# Patient Record
Sex: FEMALE | Race: OTHER | Employment: UNEMPLOYED | URBAN - METROPOLITAN AREA
[De-identification: names, ages, dates, MRNs, and addresses within clinical notes are randomized per-mention and may not be internally consistent; named-entity substitution may affect disease eponyms.]

---

## 2018-07-09 DIAGNOSIS — M79.604 PAIN OF RIGHT LEG: Primary | ICD-10-CM

## 2019-11-24 ENCOUNTER — HOSPITAL ENCOUNTER (EMERGENCY)
Facility: HOSPITAL | Age: 59
Discharge: HOME | End: 2019-11-24
Attending: EMERGENCY MEDICINE
Payer: MEDICAID

## 2019-11-24 VITALS
RESPIRATION RATE: 18 BRPM | OXYGEN SATURATION: 97 % | WEIGHT: 199 LBS | SYSTOLIC BLOOD PRESSURE: 143 MMHG | BODY MASS INDEX: 35.26 KG/M2 | DIASTOLIC BLOOD PRESSURE: 81 MMHG | HEART RATE: 88 BPM | HEIGHT: 63 IN | TEMPERATURE: 99 F

## 2019-11-24 DIAGNOSIS — K12.1 ULCERATION, ORAL MUCOSA: Primary | ICD-10-CM

## 2019-11-24 PROCEDURE — 99283 PR EMERGENCY DEPT VISIT,LEVEL III: ICD-10-PCS | Mod: ,,, | Performed by: PHYSICIAN ASSISTANT

## 2019-11-24 PROCEDURE — 99284 EMERGENCY DEPT VISIT MOD MDM: CPT

## 2019-11-24 PROCEDURE — 99283 EMERGENCY DEPT VISIT LOW MDM: CPT | Mod: ,,, | Performed by: PHYSICIAN ASSISTANT

## 2019-11-24 RX ORDER — AMITRIPTYLINE HYDROCHLORIDE 150 MG/1
150 TABLET ORAL NIGHTLY
COMMUNITY

## 2019-11-24 RX ORDER — PAROXETINE HYDROCHLORIDE 40 MG/1
40 TABLET, FILM COATED ORAL DAILY
COMMUNITY

## 2019-11-24 NOTE — ED NOTES
LOC: The patient is awake, alert and aware of environment with an appropriate affect, the patient is oriented x 3 and speaking appropriately.  APPEARANCE: Patient resting comfortably and in no acute distress, patient is clean and well groomed, patient's clothing is properly fastened.  MUSCULOSKELETAL: Patient moving all extremities spontaneously, no obvious swelling or deformities noted.  RESPIRATORY: Airway is open and patent, respirations are spontaneous, patient has a normal effort and rate, no accessory muscle use noted.

## 2019-11-24 NOTE — ED TRIAGE NOTES
"Pt reports pain to upper right gumline.  Pt states " I was playing with my dentures in my mouth because they don't fit".   "

## 2019-11-24 NOTE — ED PROVIDER NOTES
Encounter Date: 11/24/2019       History     Chief Complaint   Patient presents with    Dental Pain     was playing with dentures in my mouth yest and now i got a sore upper r going to my nose 'cavity     59-year-old female presents to the ED for evaluation of mouth pain.  Patient states that she was playing with her dentures yesterday, which she states are poorly fitted, and now has soreness to the R upper gumline.          Review of patient's allergies indicates:  No Known Allergies  No past medical history on file.  No past surgical history on file.  No family history on file.  Social History     Tobacco Use    Smoking status: Not on file   Substance Use Topics    Alcohol use: Not on file    Drug use: Not on file     Review of Systems   Constitutional: Negative for fever.   HENT: Positive for mouth sores.    Neurological: Negative for weakness.       Physical Exam     Initial Vitals [11/24/19 1644]   BP Pulse Resp Temp SpO2   (!) 143/81 88 18 98.6 °F (37 °C) 97 %      MAP       --         Physical Exam    Constitutional: She appears well-developed and well-nourished. She is not diaphoretic.  Non-toxic appearance. She does not appear ill.   HENT:   Mouth/Throat: Oral lesions present.   White ulcerations to the R upper gumline.  No significant swelling, induration, erythema. Pt is edentulous on top portion of mouth.    Pulmonary/Chest: No accessory muscle usage. No tachypnea. No respiratory distress.   Neurological: She is alert.         ED Course   Procedures  Labs Reviewed - No data to display       Imaging Results    None          Medical Decision Making:   History:   Old Medical Records: I decided to obtain old medical records.  Differential Diagnosis:   My differential diagnosis includes but is not limited to:  Ulceration, mucosal abrasion, thrush, abscess  ED Management:  58 yo with ulcerations to the gumline due to poorly fitting dentures. No evidence of infection.  Will prescribe magic mouthwash.  Pt  also has Rx pain medication for chronic pain.  Advised pt to follow up in dental clinic for refitting of dentures. Stable for discharge.                                       Clinical Impression:       ICD-10-CM ICD-9-CM   1. Ulceration, oral mucosa K12.1 528.9         Disposition:   Disposition: Discharged  Condition: Stable                     Annie العلي PA-C  11/24/19 5376

## 2019-11-24 NOTE — DISCHARGE INSTRUCTIONS
You have mouth ulcers due to poorly fitting dentures.  You should follow up with a dental clinic to have your dentures refitted.   Use the prescription mouthwash as directed.   Return to the ER for any new or significantly worsening symptoms.

## 2019-11-25 ENCOUNTER — HOSPITAL ENCOUNTER (EMERGENCY)
Facility: HOSPITAL | Age: 59
Discharge: HOME | End: 2019-11-25
Attending: EMERGENCY MEDICINE
Payer: MEDICAID

## 2019-11-25 VITALS
HEART RATE: 86 BPM | SYSTOLIC BLOOD PRESSURE: 149 MMHG | WEIGHT: 198 LBS | BODY MASS INDEX: 35.08 KG/M2 | DIASTOLIC BLOOD PRESSURE: 79 MMHG | OXYGEN SATURATION: 99 % | TEMPERATURE: 97 F | RESPIRATION RATE: 20 BRPM | HEIGHT: 63 IN

## 2019-11-25 DIAGNOSIS — K11.20 PAROTITIS: Primary | ICD-10-CM

## 2019-11-25 LAB
AMYLASE SERPL-CCNC: 34 U/L (ref 20–110)
ANION GAP SERPL CALC-SCNC: 9 MMOL/L (ref 8–16)
BASOPHILS # BLD AUTO: 0.07 K/UL (ref 0–0.2)
BASOPHILS NFR BLD: 0.5 % (ref 0–1.9)
BUN SERPL-MCNC: 9 MG/DL (ref 6–20)
CALCIUM SERPL-MCNC: 9.5 MG/DL (ref 8.7–10.5)
CHLORIDE SERPL-SCNC: 97 MMOL/L (ref 95–110)
CO2 SERPL-SCNC: 30 MMOL/L (ref 23–29)
CREAT SERPL-MCNC: 0.8 MG/DL (ref 0.5–1.4)
DIFFERENTIAL METHOD: ABNORMAL
EOSINOPHIL # BLD AUTO: 0.2 K/UL (ref 0–0.5)
EOSINOPHIL NFR BLD: 1.4 % (ref 0–8)
ERYTHROCYTE [DISTWIDTH] IN BLOOD BY AUTOMATED COUNT: 12.3 % (ref 11.5–14.5)
EST. GFR  (AFRICAN AMERICAN): >60 ML/MIN/1.73 M^2
EST. GFR  (NON AFRICAN AMERICAN): >60 ML/MIN/1.73 M^2
GLUCOSE SERPL-MCNC: 156 MG/DL (ref 70–110)
HCT VFR BLD AUTO: 40 % (ref 37–48.5)
HGB BLD-MCNC: 13.3 G/DL (ref 12–16)
IMM GRANULOCYTES # BLD AUTO: 0.06 K/UL (ref 0–0.04)
IMM GRANULOCYTES NFR BLD AUTO: 0.4 % (ref 0–0.5)
LYMPHOCYTES # BLD AUTO: 1.9 K/UL (ref 1–4.8)
LYMPHOCYTES NFR BLD: 12.8 % (ref 18–48)
MCH RBC QN AUTO: 27.9 PG (ref 27–31)
MCHC RBC AUTO-ENTMCNC: 33.3 G/DL (ref 32–36)
MCV RBC AUTO: 84 FL (ref 82–98)
MONOCYTES # BLD AUTO: 0.7 K/UL (ref 0.3–1)
MONOCYTES NFR BLD: 5 % (ref 4–15)
NEUTROPHILS # BLD AUTO: 11.6 K/UL (ref 1.8–7.7)
NEUTROPHILS NFR BLD: 79.9 % (ref 38–73)
NRBC BLD-RTO: 0 /100 WBC
PLATELET # BLD AUTO: 295 K/UL (ref 150–350)
PMV BLD AUTO: 10.2 FL (ref 9.2–12.9)
POTASSIUM SERPL-SCNC: 3.8 MMOL/L (ref 3.5–5.1)
RBC # BLD AUTO: 4.76 M/UL (ref 4–5.4)
SODIUM SERPL-SCNC: 136 MMOL/L (ref 136–145)
WBC # BLD AUTO: 14.56 K/UL (ref 3.9–12.7)

## 2019-11-25 PROCEDURE — 25000003 PHARM REV CODE 250: Performed by: EMERGENCY MEDICINE

## 2019-11-25 PROCEDURE — 96374 THER/PROPH/DIAG INJ IV PUSH: CPT

## 2019-11-25 PROCEDURE — 99284 PR EMERGENCY DEPT VISIT,LEVEL IV: ICD-10-PCS | Mod: ,,, | Performed by: EMERGENCY MEDICINE

## 2019-11-25 PROCEDURE — 82150 ASSAY OF AMYLASE: CPT

## 2019-11-25 PROCEDURE — 85025 COMPLETE CBC W/AUTO DIFF WBC: CPT

## 2019-11-25 PROCEDURE — 99285 EMERGENCY DEPT VISIT HI MDM: CPT | Mod: 25

## 2019-11-25 PROCEDURE — 99284 EMERGENCY DEPT VISIT MOD MDM: CPT | Mod: ,,, | Performed by: EMERGENCY MEDICINE

## 2019-11-25 PROCEDURE — 80048 BASIC METABOLIC PNL TOTAL CA: CPT

## 2019-11-25 PROCEDURE — 63600175 PHARM REV CODE 636 W HCPCS: Performed by: EMERGENCY MEDICINE

## 2019-11-25 PROCEDURE — 25500020 PHARM REV CODE 255: Performed by: EMERGENCY MEDICINE

## 2019-11-25 RX ORDER — IBUPROFEN 400 MG/1
400 TABLET ORAL
Status: COMPLETED | OUTPATIENT
Start: 2019-11-25 | End: 2019-11-25

## 2019-11-25 RX ORDER — CEFTRIAXONE 1 G/1
1 INJECTION, POWDER, FOR SOLUTION INTRAMUSCULAR; INTRAVENOUS
Status: COMPLETED | OUTPATIENT
Start: 2019-11-25 | End: 2019-11-25

## 2019-11-25 RX ORDER — HYDROCODONE BITARTRATE AND ACETAMINOPHEN 5; 325 MG/1; MG/1
1 TABLET ORAL EVERY 4 HOURS PRN
Qty: 18 TABLET | Refills: 0 | Status: ON HOLD | OUTPATIENT
Start: 2019-11-25 | End: 2021-06-06 | Stop reason: HOSPADM

## 2019-11-25 RX ORDER — AMOXICILLIN AND CLAVULANATE POTASSIUM 875; 125 MG/1; MG/1
1 TABLET, FILM COATED ORAL 2 TIMES DAILY
Qty: 14 TABLET | Refills: 0 | Status: ON HOLD | OUTPATIENT
Start: 2019-11-25 | End: 2021-06-24

## 2019-11-25 RX ADMIN — IOHEXOL 100 ML: 350 INJECTION, SOLUTION INTRAVENOUS at 03:11

## 2019-11-25 RX ADMIN — IBUPROFEN 400 MG: 400 TABLET, FILM COATED ORAL at 03:11

## 2019-11-25 RX ADMIN — CEFTRIAXONE SODIUM 1 G: 1 INJECTION, POWDER, FOR SOLUTION INTRAMUSCULAR; INTRAVENOUS at 05:11

## 2019-11-25 NOTE — ED NOTES
Patient identifiers verified and correct for MS Bran  C/C: Swelling to right face, redness and warmth SEE NN  APPEARANCE: awake and alert in NAD.  SKIN: warm, dry, swelling noted.   MUSCULOSKELETAL: Patient moving all extremities spontaneousl Ambulates independently.  RESPIRATORY: Denies shortness of breath.Respirations unlabored.   CARDIAC: Denies CP, 2+ distal pulses; no peripheral edema  ABDOMEN: S/ND/NT, Denies nausea  : voids spontaneously, denies difficulty  Neurologic: AAO x 4; follows commands equal strength in all extremities; denies numbness/tingling. Denies dizziness Denies weakness

## 2019-11-25 NOTE — ED TRIAGE NOTES
Patient states left facial swelling onset this am, seen in ED yesterday for pain/uklcers to mouth.

## 2019-11-25 NOTE — ED PROVIDER NOTES
Encounter Date: 11/25/2019    SCRIBE #1 NOTE: I, Rose Mary Alberto, am scribing for, and in the presence of,  Dr. Joseph Hollingsworth. I have scribed the following portions of the note - Other sections scribed: HPI, ROS, and PE .       History     Chief Complaint   Patient presents with    Facial Swelling     Patient is a 59 year old female with a past medical history of chronic back pain with complaints of facial swelling. The patient reports she visited the ED yesterday for mild mouth pain due to denture displacement. She states she woke up with right sided facial swelling this morning. The patient reports she has been treating the mouth pain and facial swelling with over the counter medications and warm compresses. She also endorses a fever. The patient reports she takes pain medications for her chronic back pain.     The history is provided by the patient and medical records.     Review of patient's allergies indicates:  No Known Allergies  History reviewed. No pertinent past medical history.  History reviewed. No pertinent surgical history.  History reviewed. No pertinent family history.  Social History     Tobacco Use    Smoking status: Current Every Day Smoker     Packs/day: 1.00     Types: Cigarettes    Smokeless tobacco: Never Used   Substance Use Topics    Alcohol use: Never     Frequency: Never    Drug use: Never     Review of Systems   Constitutional: Positive for fever. Negative for chills.   HENT: Positive for dental problem. Negative for rhinorrhea and sore throat.         + facial swelling   Eyes: Negative for visual disturbance.   Respiratory: Negative for shortness of breath.    Cardiovascular: Negative for chest pain.   Gastrointestinal: Negative for nausea and vomiting.   Genitourinary: Negative for dysuria and hematuria.   Musculoskeletal: Negative for back pain and neck pain.   Skin: Negative for rash.   Neurological: Negative for weakness and numbness.   Hematological: Does not bruise/bleed easily.        Physical Exam     Initial Vitals [11/25/19 1313]   BP Pulse Resp Temp SpO2   (!) 142/76 96 18 100 °F (37.8 °C) 100 %      MAP       --         Physical Exam    Nursing note and vitals reviewed.  Constitutional: She appears well-developed and well-nourished. She is not diaphoretic. No distress.   HENT:   Head: Normocephalic and atraumatic.   Mouth/Throat: Oropharynx is clear and moist.   + right facial swelling; + right infraorbital edema; + diffuse periodontal disease; + wear marks on upper right denture plate    Eyes: EOM are normal. Right eye exhibits no discharge. Left eye exhibits no discharge.   Neck: Normal range of motion. Neck supple.   Cardiovascular: Normal rate, regular rhythm, normal heart sounds and intact distal pulses. Exam reveals no gallop and no friction rub.    No murmur heard.  Pulmonary/Chest: Breath sounds normal. No respiratory distress.   Abdominal: Soft. Bowel sounds are normal. She exhibits no distension. There is no tenderness. There is no rebound and no guarding.   Musculoskeletal: Normal range of motion. She exhibits no tenderness.   Neurological: She is alert and oriented to person, place, and time. She has normal strength.   Skin: Skin is warm and dry. Capillary refill takes less than 2 seconds.         ED Course   Procedures  Labs Reviewed   BASIC METABOLIC PANEL - Abnormal; Notable for the following components:       Result Value    CO2 30 (*)     Glucose 156 (*)     All other components within normal limits   CBC W/ AUTO DIFFERENTIAL - Abnormal; Notable for the following components:    WBC 14.56 (*)     Gran # (ANC) 11.6 (*)     Immature Grans (Abs) 0.06 (*)     Gran% 79.9 (*)     Lymph% 12.8 (*)     All other components within normal limits   AMYLASE   AMYLASE    Narrative:     ADD-ON MITCH #[id number] PER ARABELLA CLOUD DO 16:07  11/25/2019           Imaging Results          CT Maxillofacial With Contrast (In process)  Result time 11/25/19 16:51:40                 Medical  Decision Making:   History:   Old Medical Records: I decided to obtain old medical records.  Initial Assessment:   The patient's right facial swelling and infraorbital edema. Intraoral exam does show some small ulceration over the top of her denture line.  She has some diffuse.  Oral disease no evidence of a Jovani's angina.  She is tender over the buccal mucosa numb suspicious that this could be parotitis.  Differential Diagnosis:   Differential diagnosis includes but is not limited to parotitis, odontogenic abscess, Jovain's angina  Clinical Tests:   Lab Tests: Ordered and Reviewed  Radiological Study: Ordered and Reviewed  ED Management:  Patient was seen and examined lab work was obtained CT IV maxillofacial was obtained.    1656:  Went over the results with the patient.  Radiology did contact me this is consistent with a parotitis.  She will be placed on Augmentin and instructed to suck on sour lemon wedges to increase elevation.            Scribe Attestation:   Scribe #1: I performed the above scribed service and the documentation accurately describes the services I performed. I attest to the accuracy of the note.    Attending Attestation:           Physician Attestation for Scribe:      Comments: I, Dr. Hollingsworth, personally performed the services described in this documentation. All medical record entries made by the scribe were at my direction and in my presence.  I have reviewed the chart and agree that the record reflects my personal performance and is accurate and complete. Joseph Hollingsworth DO  4:56 PM 11/25/2019                            Clinical Impression:       ICD-10-CM ICD-9-CM   1. Parotitis K11.20 527.2         Disposition:   Disposition: Discharged  Condition: Stable                     Joseph Hollingsworth DO  11/25/19 6640

## 2021-06-04 ENCOUNTER — HOSPITAL ENCOUNTER (OUTPATIENT)
Facility: HOSPITAL | Age: 61
Discharge: HOME | End: 2021-06-07
Attending: EMERGENCY MEDICINE | Admitting: STUDENT IN AN ORGANIZED HEALTH CARE EDUCATION/TRAINING PROGRAM
Payer: MEDICAID

## 2021-06-04 DIAGNOSIS — K85.10 ACUTE BILIARY PANCREATITIS, UNSPECIFIED COMPLICATION STATUS: Primary | ICD-10-CM

## 2021-06-04 DIAGNOSIS — R10.9 ABDOMINAL PAIN: ICD-10-CM

## 2021-06-04 DIAGNOSIS — K85.10 GALLSTONE PANCREATITIS: ICD-10-CM

## 2021-06-04 LAB
ALBUMIN SERPL BCP-MCNC: 3.8 G/DL (ref 3.5–5.2)
ALP SERPL-CCNC: 127 U/L (ref 55–135)
ALT SERPL W/O P-5'-P-CCNC: 58 U/L (ref 10–44)
ANION GAP SERPL CALC-SCNC: 14 MMOL/L (ref 8–16)
AST SERPL-CCNC: 41 U/L (ref 10–40)
BACTERIA #/AREA URNS AUTO: ABNORMAL /HPF
BASOPHILS # BLD AUTO: 0.04 K/UL (ref 0–0.2)
BASOPHILS NFR BLD: 0.5 % (ref 0–1.9)
BILIRUB SERPL-MCNC: 0.5 MG/DL (ref 0.1–1)
BILIRUB UR QL STRIP: NEGATIVE
BUN SERPL-MCNC: 23 MG/DL (ref 6–20)
BUN SERPL-MCNC: 26 MG/DL (ref 6–30)
CALCIUM SERPL-MCNC: 10.6 MG/DL (ref 8.7–10.5)
CHLORIDE SERPL-SCNC: 95 MMOL/L (ref 95–110)
CHLORIDE SERPL-SCNC: 97 MMOL/L (ref 95–110)
CLARITY UR REFRACT.AUTO: ABNORMAL
CO2 SERPL-SCNC: 27 MMOL/L (ref 23–29)
COLOR UR AUTO: YELLOW
CREAT SERPL-MCNC: 0.7 MG/DL (ref 0.5–1.4)
CREAT SERPL-MCNC: 0.8 MG/DL (ref 0.5–1.4)
DIFFERENTIAL METHOD: ABNORMAL
EOSINOPHIL # BLD AUTO: 0.1 K/UL (ref 0–0.5)
EOSINOPHIL NFR BLD: 0.7 % (ref 0–8)
ERYTHROCYTE [DISTWIDTH] IN BLOOD BY AUTOMATED COUNT: 11.9 % (ref 11.5–14.5)
EST. GFR  (AFRICAN AMERICAN): >60 ML/MIN/1.73 M^2
EST. GFR  (NON AFRICAN AMERICAN): >60 ML/MIN/1.73 M^2
GLUCOSE SERPL-MCNC: 148 MG/DL (ref 70–110)
GLUCOSE SERPL-MCNC: 151 MG/DL (ref 70–110)
GLUCOSE UR QL STRIP: NEGATIVE
HCT VFR BLD AUTO: 35.3 % (ref 37–48.5)
HCT VFR BLD CALC: 36 %PCV (ref 36–54)
HGB BLD-MCNC: 12.2 G/DL (ref 12–16)
HGB UR QL STRIP: ABNORMAL
IMM GRANULOCYTES # BLD AUTO: 0.02 K/UL (ref 0–0.04)
IMM GRANULOCYTES NFR BLD AUTO: 0.3 % (ref 0–0.5)
KETONES UR QL STRIP: NEGATIVE
LACTATE SERPL-SCNC: 1.2 MMOL/L (ref 0.5–2.2)
LEUKOCYTE ESTERASE UR QL STRIP: ABNORMAL
LIPASE SERPL-CCNC: 126 U/L (ref 4–60)
LYMPHOCYTES # BLD AUTO: 1.1 K/UL (ref 1–4.8)
LYMPHOCYTES NFR BLD: 14.2 % (ref 18–48)
MCH RBC QN AUTO: 28.1 PG (ref 27–31)
MCHC RBC AUTO-ENTMCNC: 34.6 G/DL (ref 32–36)
MCV RBC AUTO: 81 FL (ref 82–98)
MICROSCOPIC COMMENT: ABNORMAL
MONOCYTES # BLD AUTO: 0.6 K/UL (ref 0.3–1)
MONOCYTES NFR BLD: 7.4 % (ref 4–15)
NEUTROPHILS # BLD AUTO: 5.9 K/UL (ref 1.8–7.7)
NEUTROPHILS NFR BLD: 76.9 % (ref 38–73)
NITRITE UR QL STRIP: NEGATIVE
NON-SQ EPI CELLS #/AREA URNS AUTO: 4 /HPF
NRBC BLD-RTO: 0 /100 WBC
PH UR STRIP: 5 [PH] (ref 5–8)
PLATELET # BLD AUTO: 269 K/UL (ref 150–450)
PMV BLD AUTO: 10.7 FL (ref 9.2–12.9)
POC IONIZED CALCIUM: 1.25 MMOL/L (ref 1.06–1.42)
POC TCO2 (MEASURED): 30 MMOL/L (ref 23–29)
POTASSIUM BLD-SCNC: 3.8 MMOL/L (ref 3.5–5.1)
POTASSIUM SERPL-SCNC: 3.9 MMOL/L (ref 3.5–5.1)
PROT SERPL-MCNC: 8 G/DL (ref 6–8.4)
PROT UR QL STRIP: NEGATIVE
RBC # BLD AUTO: 4.34 M/UL (ref 4–5.4)
RBC #/AREA URNS AUTO: 10 /HPF (ref 0–4)
SAMPLE: ABNORMAL
SODIUM BLD-SCNC: 138 MMOL/L (ref 136–145)
SODIUM SERPL-SCNC: 138 MMOL/L (ref 136–145)
SP GR UR STRIP: 1.02 (ref 1–1.03)
SQUAMOUS #/AREA URNS AUTO: 4 /HPF
TROPONIN I SERPL DL<=0.01 NG/ML-MCNC: 0.01 NG/ML (ref 0–0.03)
URN SPEC COLLECT METH UR: ABNORMAL
WBC # BLD AUTO: 7.66 K/UL (ref 3.9–12.7)
WBC #/AREA URNS AUTO: 98 /HPF (ref 0–5)

## 2021-06-04 PROCEDURE — G0378 HOSPITAL OBSERVATION PER HR: HCPCS

## 2021-06-04 PROCEDURE — 87040 BLOOD CULTURE FOR BACTERIA: CPT | Performed by: EMERGENCY MEDICINE

## 2021-06-04 PROCEDURE — 80053 COMPREHEN METABOLIC PANEL: CPT | Performed by: EMERGENCY MEDICINE

## 2021-06-04 PROCEDURE — 83690 ASSAY OF LIPASE: CPT | Performed by: EMERGENCY MEDICINE

## 2021-06-04 PROCEDURE — 25000003 PHARM REV CODE 250: Performed by: EMERGENCY MEDICINE

## 2021-06-04 PROCEDURE — 96361 HYDRATE IV INFUSION ADD-ON: CPT

## 2021-06-04 PROCEDURE — 85025 COMPLETE CBC W/AUTO DIFF WBC: CPT | Performed by: EMERGENCY MEDICINE

## 2021-06-04 PROCEDURE — U0002 COVID-19 LAB TEST NON-CDC: HCPCS | Performed by: STUDENT IN AN ORGANIZED HEALTH CARE EDUCATION/TRAINING PROGRAM

## 2021-06-04 PROCEDURE — 87086 URINE CULTURE/COLONY COUNT: CPT | Performed by: EMERGENCY MEDICINE

## 2021-06-04 PROCEDURE — 93010 ELECTROCARDIOGRAM REPORT: CPT | Mod: ,,, | Performed by: INTERNAL MEDICINE

## 2021-06-04 PROCEDURE — 25500020 PHARM REV CODE 255: Performed by: EMERGENCY MEDICINE

## 2021-06-04 PROCEDURE — 80047 BASIC METABLC PNL IONIZED CA: CPT | Mod: 59

## 2021-06-04 PROCEDURE — 84484 ASSAY OF TROPONIN QUANT: CPT | Performed by: EMERGENCY MEDICINE

## 2021-06-04 PROCEDURE — 63600175 PHARM REV CODE 636 W HCPCS: Performed by: EMERGENCY MEDICINE

## 2021-06-04 PROCEDURE — 96374 THER/PROPH/DIAG INJ IV PUSH: CPT

## 2021-06-04 PROCEDURE — 96375 TX/PRO/DX INJ NEW DRUG ADDON: CPT

## 2021-06-04 PROCEDURE — 99285 PR EMERGENCY DEPT VISIT,LEVEL V: ICD-10-PCS | Mod: CS,,, | Performed by: EMERGENCY MEDICINE

## 2021-06-04 PROCEDURE — 83605 ASSAY OF LACTIC ACID: CPT | Performed by: EMERGENCY MEDICINE

## 2021-06-04 PROCEDURE — 99285 EMERGENCY DEPT VISIT HI MDM: CPT | Mod: CS,,, | Performed by: EMERGENCY MEDICINE

## 2021-06-04 PROCEDURE — 93010 EKG 12-LEAD: ICD-10-PCS | Mod: ,,, | Performed by: INTERNAL MEDICINE

## 2021-06-04 PROCEDURE — 99285 EMERGENCY DEPT VISIT HI MDM: CPT | Mod: 25

## 2021-06-04 PROCEDURE — 87186 SC STD MICRODIL/AGAR DIL: CPT | Performed by: EMERGENCY MEDICINE

## 2021-06-04 PROCEDURE — 87077 CULTURE AEROBIC IDENTIFY: CPT | Performed by: EMERGENCY MEDICINE

## 2021-06-04 PROCEDURE — 81001 URINALYSIS AUTO W/SCOPE: CPT | Performed by: EMERGENCY MEDICINE

## 2021-06-04 PROCEDURE — 93005 ELECTROCARDIOGRAM TRACING: CPT

## 2021-06-04 PROCEDURE — 87088 URINE BACTERIA CULTURE: CPT | Performed by: EMERGENCY MEDICINE

## 2021-06-04 RX ORDER — TALC
6 POWDER (GRAM) TOPICAL NIGHTLY PRN
Status: DISCONTINUED | OUTPATIENT
Start: 2021-06-05 | End: 2021-06-07 | Stop reason: HOSPADM

## 2021-06-04 RX ORDER — PAROXETINE 10 MG/1
10 TABLET, FILM COATED ORAL EVERY MORNING
Status: DISCONTINUED | OUTPATIENT
Start: 2021-06-05 | End: 2021-06-07 | Stop reason: HOSPADM

## 2021-06-04 RX ORDER — ACETAMINOPHEN 325 MG/1
650 TABLET ORAL EVERY 8 HOURS PRN
Status: DISCONTINUED | OUTPATIENT
Start: 2021-06-05 | End: 2021-06-07 | Stop reason: HOSPADM

## 2021-06-04 RX ORDER — AMITRIPTYLINE HYDROCHLORIDE 50 MG/1
50 TABLET, FILM COATED ORAL NIGHTLY
Status: DISCONTINUED | OUTPATIENT
Start: 2021-06-05 | End: 2021-06-07 | Stop reason: HOSPADM

## 2021-06-04 RX ORDER — CEFTRIAXONE 1 G/1
1 INJECTION, POWDER, FOR SOLUTION INTRAMUSCULAR; INTRAVENOUS
Status: COMPLETED | OUTPATIENT
Start: 2021-06-04 | End: 2021-06-04

## 2021-06-04 RX ORDER — ONDANSETRON 8 MG/1
8 TABLET, ORALLY DISINTEGRATING ORAL EVERY 8 HOURS PRN
Status: DISCONTINUED | OUTPATIENT
Start: 2021-06-05 | End: 2021-06-07 | Stop reason: HOSPADM

## 2021-06-04 RX ORDER — ONDANSETRON 2 MG/ML
4 INJECTION INTRAMUSCULAR; INTRAVENOUS
Status: COMPLETED | OUTPATIENT
Start: 2021-06-04 | End: 2021-06-04

## 2021-06-04 RX ORDER — SODIUM CHLORIDE 0.9 % (FLUSH) 0.9 %
10 SYRINGE (ML) INJECTION
Status: DISCONTINUED | OUTPATIENT
Start: 2021-06-05 | End: 2021-06-07

## 2021-06-04 RX ORDER — HYDROCODONE BITARTRATE AND ACETAMINOPHEN 5; 325 MG/1; MG/1
1 TABLET ORAL EVERY 4 HOURS PRN
Status: DISCONTINUED | OUTPATIENT
Start: 2021-06-05 | End: 2021-06-06

## 2021-06-04 RX ORDER — SODIUM CHLORIDE 9 MG/ML
INJECTION, SOLUTION INTRAVENOUS CONTINUOUS
Status: DISCONTINUED | OUTPATIENT
Start: 2021-06-05 | End: 2021-06-06

## 2021-06-04 RX ORDER — FAMOTIDINE 10 MG/ML
20 INJECTION INTRAVENOUS
Status: COMPLETED | OUTPATIENT
Start: 2021-06-04 | End: 2021-06-04

## 2021-06-04 RX ADMIN — FAMOTIDINE 20 MG: 10 INJECTION INTRAVENOUS at 07:06

## 2021-06-04 RX ADMIN — CEFTRIAXONE 1 G: 1 INJECTION, POWDER, FOR SOLUTION INTRAMUSCULAR; INTRAVENOUS at 09:06

## 2021-06-04 RX ADMIN — SODIUM CHLORIDE 1000 ML: 0.9 INJECTION, SOLUTION INTRAVENOUS at 07:06

## 2021-06-04 RX ADMIN — ONDANSETRON 4 MG: 2 INJECTION INTRAMUSCULAR; INTRAVENOUS at 07:06

## 2021-06-04 RX ADMIN — IOHEXOL 100 ML: 350 INJECTION, SOLUTION INTRAVENOUS at 09:06

## 2021-06-05 ENCOUNTER — ANESTHESIA EVENT (OUTPATIENT)
Dept: SURGERY | Facility: HOSPITAL | Age: 61
End: 2021-06-05
Payer: MEDICAID

## 2021-06-05 LAB
ALBUMIN SERPL BCP-MCNC: 3.4 G/DL (ref 3.5–5.2)
ALP SERPL-CCNC: 109 U/L (ref 55–135)
ALT SERPL W/O P-5'-P-CCNC: 42 U/L (ref 10–44)
ANION GAP SERPL CALC-SCNC: 10 MMOL/L (ref 8–16)
AST SERPL-CCNC: 31 U/L (ref 10–40)
BASOPHILS # BLD AUTO: 0.03 K/UL (ref 0–0.2)
BASOPHILS NFR BLD: 0.4 % (ref 0–1.9)
BILIRUB SERPL-MCNC: 0.4 MG/DL (ref 0.1–1)
BUN SERPL-MCNC: 21 MG/DL (ref 6–20)
CALCIUM SERPL-MCNC: 10 MG/DL (ref 8.7–10.5)
CHLORIDE SERPL-SCNC: 101 MMOL/L (ref 95–110)
CO2 SERPL-SCNC: 26 MMOL/L (ref 23–29)
CREAT SERPL-MCNC: 0.7 MG/DL (ref 0.5–1.4)
CTP QC/QA: YES
DIFFERENTIAL METHOD: ABNORMAL
EOSINOPHIL # BLD AUTO: 0.1 K/UL (ref 0–0.5)
EOSINOPHIL NFR BLD: 1.6 % (ref 0–8)
ERYTHROCYTE [DISTWIDTH] IN BLOOD BY AUTOMATED COUNT: 12.3 % (ref 11.5–14.5)
EST. GFR  (AFRICAN AMERICAN): >60 ML/MIN/1.73 M^2
EST. GFR  (NON AFRICAN AMERICAN): >60 ML/MIN/1.73 M^2
GLUCOSE SERPL-MCNC: 101 MG/DL (ref 70–110)
HCT VFR BLD AUTO: 30.4 % (ref 37–48.5)
HGB BLD-MCNC: 10.5 G/DL (ref 12–16)
IMM GRANULOCYTES # BLD AUTO: 0.02 K/UL (ref 0–0.04)
IMM GRANULOCYTES NFR BLD AUTO: 0.3 % (ref 0–0.5)
LYMPHOCYTES # BLD AUTO: 2.2 K/UL (ref 1–4.8)
LYMPHOCYTES NFR BLD: 32.1 % (ref 18–48)
MCH RBC QN AUTO: 28 PG (ref 27–31)
MCHC RBC AUTO-ENTMCNC: 34.5 G/DL (ref 32–36)
MCV RBC AUTO: 81 FL (ref 82–98)
MONOCYTES # BLD AUTO: 0.8 K/UL (ref 0.3–1)
MONOCYTES NFR BLD: 11.7 % (ref 4–15)
NEUTROPHILS # BLD AUTO: 3.7 K/UL (ref 1.8–7.7)
NEUTROPHILS NFR BLD: 53.9 % (ref 38–73)
NRBC BLD-RTO: 0 /100 WBC
PLATELET # BLD AUTO: 226 K/UL (ref 150–450)
PMV BLD AUTO: 10.8 FL (ref 9.2–12.9)
POCT GLUCOSE: 92 MG/DL (ref 70–110)
POTASSIUM SERPL-SCNC: 3.6 MMOL/L (ref 3.5–5.1)
PROT SERPL-MCNC: 7.1 G/DL (ref 6–8.4)
RBC # BLD AUTO: 3.75 M/UL (ref 4–5.4)
SARS-COV-2 RDRP RESP QL NAA+PROBE: NEGATIVE
SODIUM SERPL-SCNC: 137 MMOL/L (ref 136–145)
WBC # BLD AUTO: 6.85 K/UL (ref 3.9–12.7)

## 2021-06-05 PROCEDURE — 85025 COMPLETE CBC W/AUTO DIFF WBC: CPT | Performed by: STUDENT IN AN ORGANIZED HEALTH CARE EDUCATION/TRAINING PROGRAM

## 2021-06-05 PROCEDURE — G0378 HOSPITAL OBSERVATION PER HR: HCPCS

## 2021-06-05 PROCEDURE — 96361 HYDRATE IV INFUSION ADD-ON: CPT

## 2021-06-05 PROCEDURE — 25000003 PHARM REV CODE 250: Performed by: STUDENT IN AN ORGANIZED HEALTH CARE EDUCATION/TRAINING PROGRAM

## 2021-06-05 PROCEDURE — 80053 COMPREHEN METABOLIC PANEL: CPT | Performed by: STUDENT IN AN ORGANIZED HEALTH CARE EDUCATION/TRAINING PROGRAM

## 2021-06-05 PROCEDURE — 96361 HYDRATE IV INFUSION ADD-ON: CPT | Performed by: EMERGENCY MEDICINE

## 2021-06-05 RX ORDER — LORAZEPAM 2 MG/ML
1 INJECTION INTRAMUSCULAR DAILY PRN
Status: DISCONTINUED | OUTPATIENT
Start: 2021-06-05 | End: 2021-06-07

## 2021-06-05 RX ADMIN — PAROXETINE HYDROCHLORIDE 10 MG: 10 TABLET, FILM COATED ORAL at 08:06

## 2021-06-05 RX ADMIN — HYDROCODONE BITARTRATE AND ACETAMINOPHEN 1 TABLET: 5; 325 TABLET ORAL at 08:06

## 2021-06-05 RX ADMIN — MELATONIN TAB 3 MG 6 MG: 3 TAB at 10:06

## 2021-06-05 RX ADMIN — HYDROCODONE BITARTRATE AND ACETAMINOPHEN 1 TABLET: 5; 325 TABLET ORAL at 09:06

## 2021-06-05 RX ADMIN — HYDROCODONE BITARTRATE AND ACETAMINOPHEN 1 TABLET: 5; 325 TABLET ORAL at 02:06

## 2021-06-05 RX ADMIN — AMITRIPTYLINE HYDROCHLORIDE 50 MG: 50 TABLET, FILM COATED ORAL at 08:06

## 2021-06-05 RX ADMIN — HYDROCODONE BITARTRATE AND ACETAMINOPHEN 1 TABLET: 5; 325 TABLET ORAL at 04:06

## 2021-06-05 RX ADMIN — ONDANSETRON 8 MG: 8 TABLET, ORALLY DISINTEGRATING ORAL at 05:06

## 2021-06-05 RX ADMIN — SODIUM CHLORIDE: 0.9 INJECTION, SOLUTION INTRAVENOUS at 06:06

## 2021-06-05 RX ADMIN — SODIUM CHLORIDE: 0.9 INJECTION, SOLUTION INTRAVENOUS at 01:06

## 2021-06-05 RX ADMIN — SODIUM CHLORIDE: 0.9 INJECTION, SOLUTION INTRAVENOUS at 09:06

## 2021-06-05 RX ADMIN — MELATONIN TAB 3 MG 6 MG: 3 TAB at 02:06

## 2021-06-06 ENCOUNTER — ANESTHESIA (OUTPATIENT)
Dept: SURGERY | Facility: HOSPITAL | Age: 61
End: 2021-06-06
Payer: MEDICAID

## 2021-06-06 PROBLEM — K85.10 ACUTE BILIARY PANCREATITIS: Status: ACTIVE | Noted: 2021-06-06

## 2021-06-06 LAB
ALBUMIN SERPL BCP-MCNC: 3.5 G/DL (ref 3.5–5.2)
ALP SERPL-CCNC: 123 U/L (ref 55–135)
ALT SERPL W/O P-5'-P-CCNC: 53 U/L (ref 10–44)
ANION GAP SERPL CALC-SCNC: 8 MMOL/L (ref 8–16)
AST SERPL-CCNC: 43 U/L (ref 10–40)
BASOPHILS # BLD AUTO: 0.03 K/UL (ref 0–0.2)
BASOPHILS NFR BLD: 0.6 % (ref 0–1.9)
BILIRUB SERPL-MCNC: 0.7 MG/DL (ref 0.1–1)
BUN SERPL-MCNC: 13 MG/DL (ref 6–20)
CALCIUM SERPL-MCNC: 9.9 MG/DL (ref 8.7–10.5)
CHLORIDE SERPL-SCNC: 105 MMOL/L (ref 95–110)
CO2 SERPL-SCNC: 26 MMOL/L (ref 23–29)
CREAT SERPL-MCNC: 0.6 MG/DL (ref 0.5–1.4)
DIFFERENTIAL METHOD: ABNORMAL
EOSINOPHIL # BLD AUTO: 0.1 K/UL (ref 0–0.5)
EOSINOPHIL NFR BLD: 1.9 % (ref 0–8)
ERYTHROCYTE [DISTWIDTH] IN BLOOD BY AUTOMATED COUNT: 11.9 % (ref 11.5–14.5)
EST. GFR  (AFRICAN AMERICAN): >60 ML/MIN/1.73 M^2
EST. GFR  (NON AFRICAN AMERICAN): >60 ML/MIN/1.73 M^2
GLUCOSE SERPL-MCNC: 91 MG/DL (ref 70–110)
HCT VFR BLD AUTO: 32.4 % (ref 37–48.5)
HGB BLD-MCNC: 10.8 G/DL (ref 12–16)
IMM GRANULOCYTES # BLD AUTO: 0.01 K/UL (ref 0–0.04)
IMM GRANULOCYTES NFR BLD AUTO: 0.2 % (ref 0–0.5)
LYMPHOCYTES # BLD AUTO: 1.5 K/UL (ref 1–4.8)
LYMPHOCYTES NFR BLD: 26.9 % (ref 18–48)
MCH RBC QN AUTO: 27.4 PG (ref 27–31)
MCHC RBC AUTO-ENTMCNC: 33.3 G/DL (ref 32–36)
MCV RBC AUTO: 82 FL (ref 82–98)
MONOCYTES # BLD AUTO: 0.6 K/UL (ref 0.3–1)
MONOCYTES NFR BLD: 10.6 % (ref 4–15)
NEUTROPHILS # BLD AUTO: 3.2 K/UL (ref 1.8–7.7)
NEUTROPHILS NFR BLD: 59.8 % (ref 38–73)
NRBC BLD-RTO: 0 /100 WBC
PLATELET # BLD AUTO: 207 K/UL (ref 150–450)
PMV BLD AUTO: 11 FL (ref 9.2–12.9)
POCT GLUCOSE: 128 MG/DL (ref 70–110)
POTASSIUM SERPL-SCNC: 3.7 MMOL/L (ref 3.5–5.1)
PROT SERPL-MCNC: 7.3 G/DL (ref 6–8.4)
RBC # BLD AUTO: 3.94 M/UL (ref 4–5.4)
SODIUM SERPL-SCNC: 139 MMOL/L (ref 136–145)
WBC # BLD AUTO: 5.4 K/UL (ref 3.9–12.7)

## 2021-06-06 PROCEDURE — 36000709 HC OR TIME LEV III EA ADD 15 MIN: Performed by: STUDENT IN AN ORGANIZED HEALTH CARE EDUCATION/TRAINING PROGRAM

## 2021-06-06 PROCEDURE — 47562 LAPAROSCOPIC CHOLECYSTECTOMY: CPT | Mod: ,,, | Performed by: STUDENT IN AN ORGANIZED HEALTH CARE EDUCATION/TRAINING PROGRAM

## 2021-06-06 PROCEDURE — 88304 TISSUE EXAM BY PATHOLOGIST: CPT | Performed by: PATHOLOGY

## 2021-06-06 PROCEDURE — 80053 COMPREHEN METABOLIC PANEL: CPT | Performed by: STUDENT IN AN ORGANIZED HEALTH CARE EDUCATION/TRAINING PROGRAM

## 2021-06-06 PROCEDURE — G0378 HOSPITAL OBSERVATION PER HR: HCPCS

## 2021-06-06 PROCEDURE — 25000003 PHARM REV CODE 250: Performed by: NURSE ANESTHETIST, CERTIFIED REGISTERED

## 2021-06-06 PROCEDURE — 00790 ANES IPER UPR ABD NOS: CPT | Performed by: STUDENT IN AN ORGANIZED HEALTH CARE EDUCATION/TRAINING PROGRAM

## 2021-06-06 PROCEDURE — 37000008 HC ANESTHESIA 1ST 15 MINUTES: Performed by: STUDENT IN AN ORGANIZED HEALTH CARE EDUCATION/TRAINING PROGRAM

## 2021-06-06 PROCEDURE — 25000003 PHARM REV CODE 250: Performed by: STUDENT IN AN ORGANIZED HEALTH CARE EDUCATION/TRAINING PROGRAM

## 2021-06-06 PROCEDURE — 63600175 PHARM REV CODE 636 W HCPCS: Performed by: STUDENT IN AN ORGANIZED HEALTH CARE EDUCATION/TRAINING PROGRAM

## 2021-06-06 PROCEDURE — 88304 PR  SURG PATH,LEVEL III: ICD-10-PCS | Mod: 26,,, | Performed by: PATHOLOGY

## 2021-06-06 PROCEDURE — 96361 HYDRATE IV INFUSION ADD-ON: CPT | Performed by: EMERGENCY MEDICINE

## 2021-06-06 PROCEDURE — D9220A PRA ANESTHESIA: ICD-10-PCS | Mod: ANES,,, | Performed by: ANESTHESIOLOGY

## 2021-06-06 PROCEDURE — 82962 GLUCOSE BLOOD TEST: CPT | Performed by: STUDENT IN AN ORGANIZED HEALTH CARE EDUCATION/TRAINING PROGRAM

## 2021-06-06 PROCEDURE — D9220A PRA ANESTHESIA: Mod: CRNA,,, | Performed by: NURSE ANESTHETIST, CERTIFIED REGISTERED

## 2021-06-06 PROCEDURE — 71000033 HC RECOVERY, INTIAL HOUR: Performed by: STUDENT IN AN ORGANIZED HEALTH CARE EDUCATION/TRAINING PROGRAM

## 2021-06-06 PROCEDURE — 85025 COMPLETE CBC W/AUTO DIFF WBC: CPT | Performed by: STUDENT IN AN ORGANIZED HEALTH CARE EDUCATION/TRAINING PROGRAM

## 2021-06-06 PROCEDURE — 36415 COLL VENOUS BLD VENIPUNCTURE: CPT | Performed by: STUDENT IN AN ORGANIZED HEALTH CARE EDUCATION/TRAINING PROGRAM

## 2021-06-06 PROCEDURE — 47562 PR LAP,CHOLECYSTECTOMY: ICD-10-PCS | Mod: ,,, | Performed by: STUDENT IN AN ORGANIZED HEALTH CARE EDUCATION/TRAINING PROGRAM

## 2021-06-06 PROCEDURE — D9220A PRA ANESTHESIA: Mod: ANES,,, | Performed by: ANESTHESIOLOGY

## 2021-06-06 PROCEDURE — 71000015 HC POSTOP RECOV 1ST HR: Performed by: STUDENT IN AN ORGANIZED HEALTH CARE EDUCATION/TRAINING PROGRAM

## 2021-06-06 PROCEDURE — 63600175 PHARM REV CODE 636 W HCPCS: Performed by: NURSE ANESTHETIST, CERTIFIED REGISTERED

## 2021-06-06 PROCEDURE — 63600175 PHARM REV CODE 636 W HCPCS: Performed by: ANESTHESIOLOGY

## 2021-06-06 PROCEDURE — 71000016 HC POSTOP RECOV ADDL HR: Performed by: STUDENT IN AN ORGANIZED HEALTH CARE EDUCATION/TRAINING PROGRAM

## 2021-06-06 PROCEDURE — D9220A PRA ANESTHESIA: ICD-10-PCS | Mod: CRNA,,, | Performed by: NURSE ANESTHETIST, CERTIFIED REGISTERED

## 2021-06-06 PROCEDURE — 88304 TISSUE EXAM BY PATHOLOGIST: CPT | Mod: 26,,, | Performed by: PATHOLOGY

## 2021-06-06 PROCEDURE — 27201423 OPTIME MED/SURG SUP & DEVICES STERILE SUPPLY: Performed by: STUDENT IN AN ORGANIZED HEALTH CARE EDUCATION/TRAINING PROGRAM

## 2021-06-06 PROCEDURE — 36000708 HC OR TIME LEV III 1ST 15 MIN: Performed by: STUDENT IN AN ORGANIZED HEALTH CARE EDUCATION/TRAINING PROGRAM

## 2021-06-06 PROCEDURE — 37000009 HC ANESTHESIA EA ADD 15 MINS: Performed by: STUDENT IN AN ORGANIZED HEALTH CARE EDUCATION/TRAINING PROGRAM

## 2021-06-06 RX ORDER — MORPHINE SULFATE 4 MG/ML
4 INJECTION, SOLUTION INTRAMUSCULAR; INTRAVENOUS ONCE
Status: COMPLETED | OUTPATIENT
Start: 2021-06-06 | End: 2021-06-06

## 2021-06-06 RX ORDER — SODIUM CHLORIDE 0.9 % (FLUSH) 0.9 %
3 SYRINGE (ML) INJECTION
Status: DISCONTINUED | OUTPATIENT
Start: 2021-06-06 | End: 2021-06-07 | Stop reason: HOSPADM

## 2021-06-06 RX ORDER — HYDROMORPHONE HYDROCHLORIDE 1 MG/ML
0.2 INJECTION, SOLUTION INTRAMUSCULAR; INTRAVENOUS; SUBCUTANEOUS EVERY 5 MIN PRN
Status: COMPLETED | OUTPATIENT
Start: 2021-06-06 | End: 2021-06-06

## 2021-06-06 RX ORDER — LIDOCAINE HYDROCHLORIDE 20 MG/ML
INJECTION, SOLUTION EPIDURAL; INFILTRATION; INTRACAUDAL; PERINEURAL
Status: DISCONTINUED | OUTPATIENT
Start: 2021-06-06 | End: 2021-06-06

## 2021-06-06 RX ORDER — BUPIVACAINE HYDROCHLORIDE 5 MG/ML
INJECTION, SOLUTION EPIDURAL; INTRACAUDAL
Status: DISCONTINUED | OUTPATIENT
Start: 2021-06-06 | End: 2021-06-06 | Stop reason: HOSPADM

## 2021-06-06 RX ORDER — HYDROMORPHONE HYDROCHLORIDE 1 MG/ML
INJECTION, SOLUTION INTRAMUSCULAR; INTRAVENOUS; SUBCUTANEOUS
Status: DISPENSED
Start: 2021-06-06 | End: 2021-06-06

## 2021-06-06 RX ORDER — ONDANSETRON 2 MG/ML
INJECTION INTRAMUSCULAR; INTRAVENOUS
Status: DISCONTINUED | OUTPATIENT
Start: 2021-06-06 | End: 2021-06-06

## 2021-06-06 RX ORDER — OXYCODONE AND ACETAMINOPHEN 10; 325 MG/1; MG/1
1 TABLET ORAL EVERY 4 HOURS PRN
Status: DISCONTINUED | OUTPATIENT
Start: 2021-06-06 | End: 2021-06-07 | Stop reason: HOSPADM

## 2021-06-06 RX ORDER — MIDAZOLAM HYDROCHLORIDE 1 MG/ML
INJECTION, SOLUTION INTRAMUSCULAR; INTRAVENOUS
Status: DISCONTINUED | OUTPATIENT
Start: 2021-06-06 | End: 2021-06-06

## 2021-06-06 RX ORDER — FENTANYL CITRATE 50 UG/ML
INJECTION, SOLUTION INTRAMUSCULAR; INTRAVENOUS
Status: DISCONTINUED | OUTPATIENT
Start: 2021-06-06 | End: 2021-06-06

## 2021-06-06 RX ORDER — PROPOFOL 10 MG/ML
VIAL (ML) INTRAVENOUS
Status: DISCONTINUED | OUTPATIENT
Start: 2021-06-06 | End: 2021-06-06

## 2021-06-06 RX ORDER — OXYCODONE AND ACETAMINOPHEN 5; 325 MG/1; MG/1
1 TABLET ORAL EVERY 4 HOURS PRN
Status: DISCONTINUED | OUTPATIENT
Start: 2021-06-06 | End: 2021-06-07 | Stop reason: HOSPADM

## 2021-06-06 RX ORDER — HYDROCODONE BITARTRATE AND ACETAMINOPHEN 5; 325 MG/1; MG/1
1 TABLET ORAL EVERY 4 HOURS PRN
Qty: 20 TABLET | Refills: 0 | Status: ON HOLD | OUTPATIENT
Start: 2021-06-06 | End: 2021-06-24

## 2021-06-06 RX ORDER — DEXAMETHASONE SODIUM PHOSPHATE 4 MG/ML
INJECTION, SOLUTION INTRA-ARTICULAR; INTRALESIONAL; INTRAMUSCULAR; INTRAVENOUS; SOFT TISSUE
Status: DISCONTINUED | OUTPATIENT
Start: 2021-06-06 | End: 2021-06-06

## 2021-06-06 RX ORDER — SUCCINYLCHOLINE CHLORIDE 20 MG/ML
INJECTION INTRAMUSCULAR; INTRAVENOUS
Status: DISCONTINUED | OUTPATIENT
Start: 2021-06-06 | End: 2021-06-06

## 2021-06-06 RX ORDER — CEFAZOLIN SODIUM 1 G/3ML
INJECTION, POWDER, FOR SOLUTION INTRAMUSCULAR; INTRAVENOUS
Status: DISCONTINUED | OUTPATIENT
Start: 2021-06-06 | End: 2021-06-06

## 2021-06-06 RX ORDER — ROCURONIUM BROMIDE 10 MG/ML
INJECTION, SOLUTION INTRAVENOUS
Status: DISCONTINUED | OUTPATIENT
Start: 2021-06-06 | End: 2021-06-06

## 2021-06-06 RX ADMIN — HYDROMORPHONE HYDROCHLORIDE 0.2 MG: 1 INJECTION, SOLUTION INTRAMUSCULAR; INTRAVENOUS; SUBCUTANEOUS at 10:06

## 2021-06-06 RX ADMIN — SODIUM CHLORIDE: 0.9 INJECTION, SOLUTION INTRAVENOUS at 07:06

## 2021-06-06 RX ADMIN — PAROXETINE HYDROCHLORIDE 10 MG: 10 TABLET, FILM COATED ORAL at 06:06

## 2021-06-06 RX ADMIN — LIDOCAINE HYDROCHLORIDE 80 MG: 20 INJECTION, SOLUTION EPIDURAL; INFILTRATION; INTRACAUDAL at 08:06

## 2021-06-06 RX ADMIN — HYDROMORPHONE HYDROCHLORIDE 0.2 MG: 1 INJECTION, SOLUTION INTRAMUSCULAR; INTRAVENOUS; SUBCUTANEOUS at 09:06

## 2021-06-06 RX ADMIN — OXYCODONE AND ACETAMINOPHEN 1 TABLET: 10; 325 TABLET ORAL at 10:06

## 2021-06-06 RX ADMIN — SUGAMMADEX 200 MG: 100 INJECTION, SOLUTION INTRAVENOUS at 09:06

## 2021-06-06 RX ADMIN — ONDANSETRON 4 MG: 2 INJECTION INTRAMUSCULAR; INTRAVENOUS at 08:06

## 2021-06-06 RX ADMIN — SODIUM CHLORIDE: 0.9 INJECTION, SOLUTION INTRAVENOUS at 08:06

## 2021-06-06 RX ADMIN — MELATONIN TAB 3 MG 6 MG: 3 TAB at 10:06

## 2021-06-06 RX ADMIN — HYDROCODONE BITARTRATE AND ACETAMINOPHEN 1 TABLET: 5; 325 TABLET ORAL at 01:06

## 2021-06-06 RX ADMIN — SUCCINYLCHOLINE CHLORIDE 120 MG: 20 INJECTION, SOLUTION INTRAMUSCULAR; INTRAVENOUS; PARENTERAL at 08:06

## 2021-06-06 RX ADMIN — MIDAZOLAM 2 MG: 1 INJECTION INTRAMUSCULAR; INTRAVENOUS at 07:06

## 2021-06-06 RX ADMIN — FENTANYL CITRATE 100 MCG: 50 INJECTION, SOLUTION INTRAMUSCULAR; INTRAVENOUS at 08:06

## 2021-06-06 RX ADMIN — AMITRIPTYLINE HYDROCHLORIDE 50 MG: 50 TABLET, FILM COATED ORAL at 10:06

## 2021-06-06 RX ADMIN — CEFAZOLIN 2 G: 330 INJECTION, POWDER, FOR SOLUTION INTRAMUSCULAR; INTRAVENOUS at 08:06

## 2021-06-06 RX ADMIN — ROCURONIUM BROMIDE 25 MG: 10 INJECTION, SOLUTION INTRAVENOUS at 08:06

## 2021-06-06 RX ADMIN — HYDROCODONE BITARTRATE AND ACETAMINOPHEN 1 TABLET: 5; 325 TABLET ORAL at 06:06

## 2021-06-06 RX ADMIN — PROPOFOL 200 MG: 10 INJECTION, EMULSION INTRAVENOUS at 08:06

## 2021-06-06 RX ADMIN — DEXAMETHASONE SODIUM PHOSPHATE 8 MG: 4 INJECTION INTRA-ARTICULAR; INTRALESIONAL; INTRAMUSCULAR; INTRAVENOUS; SOFT TISSUE at 08:06

## 2021-06-06 RX ADMIN — ROCURONIUM BROMIDE 10 MG: 10 INJECTION, SOLUTION INTRAVENOUS at 08:06

## 2021-06-06 RX ADMIN — SODIUM CHLORIDE: 0.9 INJECTION, SOLUTION INTRAVENOUS at 01:06

## 2021-06-06 RX ADMIN — MORPHINE SULFATE 4 MG: 4 INJECTION INTRAVENOUS at 01:06

## 2021-06-06 RX ADMIN — FENTANYL CITRATE 100 MCG: 50 INJECTION, SOLUTION INTRAMUSCULAR; INTRAVENOUS at 09:06

## 2021-06-06 RX ADMIN — OXYCODONE AND ACETAMINOPHEN 1 TABLET: 10; 325 TABLET ORAL at 02:06

## 2021-06-07 VITALS
DIASTOLIC BLOOD PRESSURE: 70 MMHG | HEART RATE: 96 BPM | OXYGEN SATURATION: 95 % | TEMPERATURE: 99 F | SYSTOLIC BLOOD PRESSURE: 160 MMHG | RESPIRATION RATE: 17 BRPM | BODY MASS INDEX: 31.58 KG/M2 | HEIGHT: 64 IN | WEIGHT: 185 LBS

## 2021-06-07 LAB
ALBUMIN SERPL BCP-MCNC: 3.4 G/DL (ref 3.5–5.2)
ALP SERPL-CCNC: 113 U/L (ref 55–135)
ALT SERPL W/O P-5'-P-CCNC: 58 U/L (ref 10–44)
ANION GAP SERPL CALC-SCNC: 12 MMOL/L (ref 8–16)
AST SERPL-CCNC: 43 U/L (ref 10–40)
BACTERIA UR CULT: ABNORMAL
BASOPHILS # BLD AUTO: 0.02 K/UL (ref 0–0.2)
BASOPHILS NFR BLD: 0.3 % (ref 0–1.9)
BILIRUB SERPL-MCNC: 0.6 MG/DL (ref 0.1–1)
BUN SERPL-MCNC: 15 MG/DL (ref 6–20)
CALCIUM SERPL-MCNC: 9.8 MG/DL (ref 8.7–10.5)
CHLORIDE SERPL-SCNC: 103 MMOL/L (ref 95–110)
CO2 SERPL-SCNC: 22 MMOL/L (ref 23–29)
CREAT SERPL-MCNC: 0.6 MG/DL (ref 0.5–1.4)
DIFFERENTIAL METHOD: ABNORMAL
EOSINOPHIL # BLD AUTO: 0 K/UL (ref 0–0.5)
EOSINOPHIL NFR BLD: 0.3 % (ref 0–8)
ERYTHROCYTE [DISTWIDTH] IN BLOOD BY AUTOMATED COUNT: 11.9 % (ref 11.5–14.5)
EST. GFR  (AFRICAN AMERICAN): >60 ML/MIN/1.73 M^2
EST. GFR  (NON AFRICAN AMERICAN): >60 ML/MIN/1.73 M^2
GLUCOSE SERPL-MCNC: 105 MG/DL (ref 70–110)
HCT VFR BLD AUTO: 30.9 % (ref 37–48.5)
HGB BLD-MCNC: 10.4 G/DL (ref 12–16)
IMM GRANULOCYTES # BLD AUTO: 0.01 K/UL (ref 0–0.04)
IMM GRANULOCYTES NFR BLD AUTO: 0.1 % (ref 0–0.5)
LYMPHOCYTES # BLD AUTO: 1.5 K/UL (ref 1–4.8)
LYMPHOCYTES NFR BLD: 21.7 % (ref 18–48)
MCH RBC QN AUTO: 27.2 PG (ref 27–31)
MCHC RBC AUTO-ENTMCNC: 33.7 G/DL (ref 32–36)
MCV RBC AUTO: 81 FL (ref 82–98)
MONOCYTES # BLD AUTO: 0.7 K/UL (ref 0.3–1)
MONOCYTES NFR BLD: 10.6 % (ref 4–15)
NEUTROPHILS # BLD AUTO: 4.7 K/UL (ref 1.8–7.7)
NEUTROPHILS NFR BLD: 67 % (ref 38–73)
NRBC BLD-RTO: 0 /100 WBC
PLATELET # BLD AUTO: 213 K/UL (ref 150–450)
PMV BLD AUTO: 10.7 FL (ref 9.2–12.9)
POTASSIUM SERPL-SCNC: 3.9 MMOL/L (ref 3.5–5.1)
PROT SERPL-MCNC: 7 G/DL (ref 6–8.4)
RBC # BLD AUTO: 3.83 M/UL (ref 4–5.4)
SODIUM SERPL-SCNC: 137 MMOL/L (ref 136–145)
WBC # BLD AUTO: 6.99 K/UL (ref 3.9–12.7)

## 2021-06-07 PROCEDURE — 25000003 PHARM REV CODE 250: Performed by: STUDENT IN AN ORGANIZED HEALTH CARE EDUCATION/TRAINING PROGRAM

## 2021-06-07 PROCEDURE — 85025 COMPLETE CBC W/AUTO DIFF WBC: CPT | Performed by: STUDENT IN AN ORGANIZED HEALTH CARE EDUCATION/TRAINING PROGRAM

## 2021-06-07 PROCEDURE — 99024 PR POST-OP FOLLOW-UP VISIT: ICD-10-PCS | Mod: POP,,, | Performed by: STUDENT IN AN ORGANIZED HEALTH CARE EDUCATION/TRAINING PROGRAM

## 2021-06-07 PROCEDURE — 36415 COLL VENOUS BLD VENIPUNCTURE: CPT | Performed by: STUDENT IN AN ORGANIZED HEALTH CARE EDUCATION/TRAINING PROGRAM

## 2021-06-07 PROCEDURE — 99024 POSTOP FOLLOW-UP VISIT: CPT | Mod: POP,,, | Performed by: STUDENT IN AN ORGANIZED HEALTH CARE EDUCATION/TRAINING PROGRAM

## 2021-06-07 PROCEDURE — 80053 COMPREHEN METABOLIC PANEL: CPT | Performed by: STUDENT IN AN ORGANIZED HEALTH CARE EDUCATION/TRAINING PROGRAM

## 2021-06-07 PROCEDURE — G0378 HOSPITAL OBSERVATION PER HR: HCPCS

## 2021-06-07 RX ORDER — LABETALOL HCL 20 MG/4 ML
10 SYRINGE (ML) INTRAVENOUS EVERY 4 HOURS PRN
Status: DISCONTINUED | OUTPATIENT
Start: 2021-06-07 | End: 2021-06-07 | Stop reason: HOSPADM

## 2021-06-07 RX ORDER — AMLODIPINE BESYLATE 5 MG/1
5 TABLET ORAL DAILY
Qty: 30 TABLET | Refills: 0 | OUTPATIENT
Start: 2021-06-08 | End: 2021-06-09

## 2021-06-07 RX ORDER — AMLODIPINE BESYLATE 5 MG/1
5 TABLET ORAL DAILY
Status: DISCONTINUED | OUTPATIENT
Start: 2021-06-07 | End: 2021-06-07 | Stop reason: HOSPADM

## 2021-06-07 RX ADMIN — AMLODIPINE BESYLATE 5 MG: 5 TABLET ORAL at 03:06

## 2021-06-07 RX ADMIN — ONDANSETRON 8 MG: 8 TABLET, ORALLY DISINTEGRATING ORAL at 09:06

## 2021-06-07 RX ADMIN — LABETALOL HYDROCHLORIDE 10 MG: 5 INJECTION, SOLUTION INTRAVENOUS at 01:06

## 2021-06-08 ENCOUNTER — HOSPITAL ENCOUNTER (OUTPATIENT)
Facility: HOSPITAL | Age: 61
Discharge: HOME | End: 2021-06-09
Attending: EMERGENCY MEDICINE | Admitting: EMERGENCY MEDICINE
Payer: MEDICAID

## 2021-06-08 DIAGNOSIS — R79.89 ELEVATED TROPONIN: ICD-10-CM

## 2021-06-08 DIAGNOSIS — R06.02 SHORTNESS OF BREATH: ICD-10-CM

## 2021-06-08 DIAGNOSIS — R11.10 VOMITING, INTRACTABILITY OF VOMITING NOT SPECIFIED, PRESENCE OF NAUSEA NOT SPECIFIED, UNSPECIFIED VOMITING TYPE: Primary | ICD-10-CM

## 2021-06-08 DIAGNOSIS — G89.18 POST-OP PAIN: ICD-10-CM

## 2021-06-08 DIAGNOSIS — N30.01 ACUTE CYSTITIS WITH HEMATURIA: ICD-10-CM

## 2021-06-08 DIAGNOSIS — R03.0 ELEVATED BLOOD PRESSURE READING: ICD-10-CM

## 2021-06-08 PROCEDURE — 99285 EMERGENCY DEPT VISIT HI MDM: CPT | Mod: 25

## 2021-06-08 PROCEDURE — 99285 PR EMERGENCY DEPT VISIT,LEVEL V: ICD-10-PCS | Mod: CS,,, | Performed by: EMERGENCY MEDICINE

## 2021-06-08 PROCEDURE — 99285 EMERGENCY DEPT VISIT HI MDM: CPT | Mod: CS,,, | Performed by: EMERGENCY MEDICINE

## 2021-06-08 PROCEDURE — 93005 ELECTROCARDIOGRAM TRACING: CPT

## 2021-06-08 PROCEDURE — 93010 ELECTROCARDIOGRAM REPORT: CPT | Mod: ,,, | Performed by: INTERNAL MEDICINE

## 2021-06-08 PROCEDURE — 93010 EKG 12-LEAD: ICD-10-PCS | Mod: ,,, | Performed by: INTERNAL MEDICINE

## 2021-06-08 RX ORDER — ONDANSETRON 2 MG/ML
6 INJECTION INTRAMUSCULAR; INTRAVENOUS
Status: COMPLETED | OUTPATIENT
Start: 2021-06-09 | End: 2021-06-09

## 2021-06-08 RX ORDER — MORPHINE SULFATE 4 MG/ML
4 INJECTION, SOLUTION INTRAMUSCULAR; INTRAVENOUS
Status: COMPLETED | OUTPATIENT
Start: 2021-06-09 | End: 2021-06-09

## 2021-06-09 VITALS
RESPIRATION RATE: 18 BRPM | OXYGEN SATURATION: 100 % | HEART RATE: 93 BPM | SYSTOLIC BLOOD PRESSURE: 164 MMHG | TEMPERATURE: 99 F | DIASTOLIC BLOOD PRESSURE: 70 MMHG

## 2021-06-09 PROBLEM — I10 HTN (HYPERTENSION): Status: ACTIVE | Noted: 2021-06-09

## 2021-06-09 PROBLEM — G89.18 POST-OP PAIN: Status: ACTIVE | Noted: 2021-06-09

## 2021-06-09 PROBLEM — R79.89 ELEVATED TROPONIN: Status: ACTIVE | Noted: 2021-06-09

## 2021-06-09 PROBLEM — R11.10 VOMITING: Status: ACTIVE | Noted: 2021-06-09

## 2021-06-09 LAB
ALBUMIN SERPL BCP-MCNC: 3.7 G/DL (ref 3.5–5.2)
ALP SERPL-CCNC: 131 U/L (ref 55–135)
ALT SERPL W/O P-5'-P-CCNC: 73 U/L (ref 10–44)
ANION GAP SERPL CALC-SCNC: 12 MMOL/L (ref 8–16)
AST SERPL-CCNC: 43 U/L (ref 10–40)
BACTERIA #/AREA URNS AUTO: ABNORMAL /HPF
BACTERIA BLD CULT: NORMAL
BACTERIA BLD CULT: NORMAL
BASOPHILS # BLD AUTO: 0.04 K/UL (ref 0–0.2)
BASOPHILS NFR BLD: 0.5 % (ref 0–1.9)
BILIRUB SERPL-MCNC: 0.6 MG/DL (ref 0.1–1)
BILIRUB UR QL STRIP: NEGATIVE
BNP SERPL-MCNC: 101 PG/ML (ref 0–99)
BUN SERPL-MCNC: 17 MG/DL (ref 6–20)
BUN SERPL-MCNC: 17 MG/DL (ref 6–30)
CALCIUM SERPL-MCNC: 10.5 MG/DL (ref 8.7–10.5)
CHLORIDE SERPL-SCNC: 103 MMOL/L (ref 95–110)
CHLORIDE SERPL-SCNC: 103 MMOL/L (ref 95–110)
CHOLEST SERPL-MCNC: 105 MG/DL (ref 120–199)
CHOLEST/HDLC SERPL: 3.6 {RATIO} (ref 2–5)
CLARITY UR REFRACT.AUTO: ABNORMAL
CO2 SERPL-SCNC: 23 MMOL/L (ref 23–29)
COLOR UR AUTO: YELLOW
CREAT SERPL-MCNC: 0.4 MG/DL (ref 0.5–1.4)
CREAT SERPL-MCNC: 0.6 MG/DL (ref 0.5–1.4)
CTP QC/QA: YES
D DIMER PPP IA.FEU-MCNC: 2.41 MG/L FEU
DIFFERENTIAL METHOD: ABNORMAL
EOSINOPHIL # BLD AUTO: 0 K/UL (ref 0–0.5)
EOSINOPHIL NFR BLD: 0.2 % (ref 0–8)
ERYTHROCYTE [DISTWIDTH] IN BLOOD BY AUTOMATED COUNT: 12 % (ref 11.5–14.5)
EST. GFR  (AFRICAN AMERICAN): >60 ML/MIN/1.73 M^2
EST. GFR  (NON AFRICAN AMERICAN): >60 ML/MIN/1.73 M^2
ESTIMATED AVG GLUCOSE: 123 MG/DL (ref 68–131)
FINAL PATHOLOGIC DIAGNOSIS: NORMAL
GLUCOSE SERPL-MCNC: 114 MG/DL (ref 70–110)
GLUCOSE SERPL-MCNC: 115 MG/DL (ref 70–110)
GLUCOSE UR QL STRIP: NEGATIVE
GROSS: NORMAL
HBA1C MFR BLD: 5.9 % (ref 4–5.6)
HCT VFR BLD AUTO: 34.4 % (ref 37–48.5)
HCT VFR BLD CALC: 33 %PCV (ref 36–54)
HDLC SERPL-MCNC: 29 MG/DL (ref 40–75)
HDLC SERPL: 27.6 % (ref 20–50)
HGB BLD-MCNC: 11.7 G/DL (ref 12–16)
HGB UR QL STRIP: ABNORMAL
IMM GRANULOCYTES # BLD AUTO: 0.03 K/UL (ref 0–0.04)
IMM GRANULOCYTES NFR BLD AUTO: 0.3 % (ref 0–0.5)
KETONES UR QL STRIP: ABNORMAL
LDLC SERPL CALC-MCNC: 60.8 MG/DL (ref 63–159)
LEUKOCYTE ESTERASE UR QL STRIP: ABNORMAL
LIPASE SERPL-CCNC: 71 U/L (ref 4–60)
LYMPHOCYTES # BLD AUTO: 1.8 K/UL (ref 1–4.8)
LYMPHOCYTES NFR BLD: 20.5 % (ref 18–48)
Lab: NORMAL
MAGNESIUM SERPL-MCNC: 2 MG/DL (ref 1.6–2.6)
MCH RBC QN AUTO: 27.3 PG (ref 27–31)
MCHC RBC AUTO-ENTMCNC: 34 G/DL (ref 32–36)
MCV RBC AUTO: 80 FL (ref 82–98)
MICROSCOPIC COMMENT: ABNORMAL
MONOCYTES # BLD AUTO: 0.8 K/UL (ref 0.3–1)
MONOCYTES NFR BLD: 8.7 % (ref 4–15)
NEUTROPHILS # BLD AUTO: 6 K/UL (ref 1.8–7.7)
NEUTROPHILS NFR BLD: 69.8 % (ref 38–73)
NITRITE UR QL STRIP: NEGATIVE
NONHDLC SERPL-MCNC: 76 MG/DL
NRBC BLD-RTO: 0 /100 WBC
PH UR STRIP: 6 [PH] (ref 5–8)
PLATELET # BLD AUTO: 275 K/UL (ref 150–450)
PMV BLD AUTO: 10.6 FL (ref 9.2–12.9)
POC IONIZED CALCIUM: 1.22 MMOL/L (ref 1.06–1.42)
POC TCO2 (MEASURED): 24 MMOL/L (ref 23–29)
POTASSIUM BLD-SCNC: 4.2 MMOL/L (ref 3.5–5.1)
POTASSIUM SERPL-SCNC: 4.4 MMOL/L (ref 3.5–5.1)
PROT SERPL-MCNC: 8 G/DL (ref 6–8.4)
PROT UR QL STRIP: NEGATIVE
RBC # BLD AUTO: 4.29 M/UL (ref 4–5.4)
RBC #/AREA URNS AUTO: 28 /HPF (ref 0–4)
SAMPLE: ABNORMAL
SARS-COV-2 RDRP RESP QL NAA+PROBE: NEGATIVE
SODIUM BLD-SCNC: 141 MMOL/L (ref 136–145)
SODIUM SERPL-SCNC: 138 MMOL/L (ref 136–145)
SP GR UR STRIP: 1.02 (ref 1–1.03)
SQUAMOUS #/AREA URNS AUTO: 8 /HPF
TRIGL SERPL-MCNC: 76 MG/DL (ref 30–150)
TROPONIN I SERPL DL<=0.01 NG/ML-MCNC: 0.08 NG/ML (ref 0–0.03)
TROPONIN I SERPL DL<=0.01 NG/ML-MCNC: 0.09 NG/ML (ref 0–0.03)
TROPONIN I SERPL DL<=0.01 NG/ML-MCNC: 0.1 NG/ML (ref 0–0.03)
TROPONIN I SERPL DL<=0.01 NG/ML-MCNC: 0.1 NG/ML (ref 0–0.03)
URN SPEC COLLECT METH UR: ABNORMAL
WBC # BLD AUTO: 8.58 K/UL (ref 3.9–12.7)
WBC #/AREA URNS AUTO: 42 /HPF (ref 0–5)
YEAST UR QL AUTO: ABNORMAL

## 2021-06-09 PROCEDURE — 80047 BASIC METABLC PNL IONIZED CA: CPT

## 2021-06-09 PROCEDURE — 83880 ASSAY OF NATRIURETIC PEPTIDE: CPT | Performed by: EMERGENCY MEDICINE

## 2021-06-09 PROCEDURE — 96376 TX/PRO/DX INJ SAME DRUG ADON: CPT

## 2021-06-09 PROCEDURE — G0378 HOSPITAL OBSERVATION PER HR: HCPCS

## 2021-06-09 PROCEDURE — 96361 HYDRATE IV INFUSION ADD-ON: CPT

## 2021-06-09 PROCEDURE — 93010 EKG 12-LEAD: ICD-10-PCS | Mod: ,,, | Performed by: INTERNAL MEDICINE

## 2021-06-09 PROCEDURE — 99234 HOSP IP/OBS SM DT SF/LOW 45: CPT | Mod: ,,, | Performed by: PHYSICIAN ASSISTANT

## 2021-06-09 PROCEDURE — 85025 COMPLETE CBC W/AUTO DIFF WBC: CPT | Performed by: EMERGENCY MEDICINE

## 2021-06-09 PROCEDURE — 81001 URINALYSIS AUTO W/SCOPE: CPT | Performed by: EMERGENCY MEDICINE

## 2021-06-09 PROCEDURE — 84484 ASSAY OF TROPONIN QUANT: CPT | Mod: 91 | Performed by: PHYSICIAN ASSISTANT

## 2021-06-09 PROCEDURE — 96374 THER/PROPH/DIAG INJ IV PUSH: CPT

## 2021-06-09 PROCEDURE — 25500020 PHARM REV CODE 255: Performed by: EMERGENCY MEDICINE

## 2021-06-09 PROCEDURE — 63600175 PHARM REV CODE 636 W HCPCS: Performed by: EMERGENCY MEDICINE

## 2021-06-09 PROCEDURE — 63600175 PHARM REV CODE 636 W HCPCS: Performed by: PHYSICIAN ASSISTANT

## 2021-06-09 PROCEDURE — 99204 PR OFFICE/OUTPT VISIT, NEW, LEVL IV, 45-59 MIN: ICD-10-PCS | Mod: ,,, | Performed by: INTERNAL MEDICINE

## 2021-06-09 PROCEDURE — 83735 ASSAY OF MAGNESIUM: CPT | Performed by: EMERGENCY MEDICINE

## 2021-06-09 PROCEDURE — 99204 OFFICE O/P NEW MOD 45 MIN: CPT | Mod: ,,, | Performed by: INTERNAL MEDICINE

## 2021-06-09 PROCEDURE — 93005 ELECTROCARDIOGRAM TRACING: CPT

## 2021-06-09 PROCEDURE — 25000003 PHARM REV CODE 250: Performed by: EMERGENCY MEDICINE

## 2021-06-09 PROCEDURE — U0002 COVID-19 LAB TEST NON-CDC: HCPCS | Performed by: EMERGENCY MEDICINE

## 2021-06-09 PROCEDURE — 99234 PR OBSERV/HOSP SAME DATE,LEVL III: ICD-10-PCS | Mod: ,,, | Performed by: PHYSICIAN ASSISTANT

## 2021-06-09 PROCEDURE — 83036 HEMOGLOBIN GLYCOSYLATED A1C: CPT | Performed by: EMERGENCY MEDICINE

## 2021-06-09 PROCEDURE — 83690 ASSAY OF LIPASE: CPT | Performed by: EMERGENCY MEDICINE

## 2021-06-09 PROCEDURE — 96375 TX/PRO/DX INJ NEW DRUG ADDON: CPT

## 2021-06-09 PROCEDURE — 80053 COMPREHEN METABOLIC PANEL: CPT | Performed by: EMERGENCY MEDICINE

## 2021-06-09 PROCEDURE — 93010 ELECTROCARDIOGRAM REPORT: CPT | Mod: ,,, | Performed by: INTERNAL MEDICINE

## 2021-06-09 PROCEDURE — 87086 URINE CULTURE/COLONY COUNT: CPT | Performed by: EMERGENCY MEDICINE

## 2021-06-09 PROCEDURE — 82330 ASSAY OF CALCIUM: CPT

## 2021-06-09 PROCEDURE — 85379 FIBRIN DEGRADATION QUANT: CPT | Performed by: EMERGENCY MEDICINE

## 2021-06-09 PROCEDURE — 80061 LIPID PANEL: CPT | Performed by: EMERGENCY MEDICINE

## 2021-06-09 PROCEDURE — 84484 ASSAY OF TROPONIN QUANT: CPT | Mod: 91 | Performed by: EMERGENCY MEDICINE

## 2021-06-09 PROCEDURE — 84484 ASSAY OF TROPONIN QUANT: CPT | Performed by: EMERGENCY MEDICINE

## 2021-06-09 RX ORDER — CEFTRIAXONE 1 G/1
1 INJECTION, POWDER, FOR SOLUTION INTRAMUSCULAR; INTRAVENOUS
Status: COMPLETED | OUTPATIENT
Start: 2021-06-09 | End: 2021-06-09

## 2021-06-09 RX ORDER — NITROFURANTOIN 25; 75 MG/1; MG/1
100 CAPSULE ORAL 2 TIMES DAILY
Qty: 10 CAPSULE | Refills: 0 | Status: SHIPPED | OUTPATIENT
Start: 2021-06-09 | End: 2021-06-14

## 2021-06-09 RX ORDER — LABETALOL HCL 20 MG/4 ML
10 SYRINGE (ML) INTRAVENOUS
Status: COMPLETED | OUTPATIENT
Start: 2021-06-09 | End: 2021-06-09

## 2021-06-09 RX ORDER — PROCHLORPERAZINE EDISYLATE 5 MG/ML
5 INJECTION INTRAMUSCULAR; INTRAVENOUS EVERY 6 HOURS PRN
Status: DISCONTINUED | OUTPATIENT
Start: 2021-06-09 | End: 2021-06-09 | Stop reason: HOSPADM

## 2021-06-09 RX ORDER — SODIUM CHLORIDE 0.9 % (FLUSH) 0.9 %
10 SYRINGE (ML) INJECTION
Status: DISCONTINUED | OUTPATIENT
Start: 2021-06-09 | End: 2021-06-09 | Stop reason: HOSPADM

## 2021-06-09 RX ORDER — TALC
6 POWDER (GRAM) TOPICAL NIGHTLY PRN
Status: DISCONTINUED | OUTPATIENT
Start: 2021-06-09 | End: 2021-06-09 | Stop reason: HOSPADM

## 2021-06-09 RX ORDER — ACETAMINOPHEN 325 MG/1
650 TABLET ORAL EVERY 8 HOURS PRN
Status: DISCONTINUED | OUTPATIENT
Start: 2021-06-09 | End: 2021-06-09 | Stop reason: HOSPADM

## 2021-06-09 RX ORDER — ONDANSETRON 2 MG/ML
4 INJECTION INTRAMUSCULAR; INTRAVENOUS
Status: COMPLETED | OUTPATIENT
Start: 2021-06-09 | End: 2021-06-09

## 2021-06-09 RX ORDER — AMLODIPINE BESYLATE 5 MG/1
5 TABLET ORAL
Status: COMPLETED | OUTPATIENT
Start: 2021-06-09 | End: 2021-06-09

## 2021-06-09 RX ORDER — LISINOPRIL 10 MG/1
10 TABLET ORAL DAILY
Qty: 30 TABLET | Refills: 2 | Status: SHIPPED | OUTPATIENT
Start: 2021-06-09 | End: 2022-06-09

## 2021-06-09 RX ORDER — AMLODIPINE BESYLATE 10 MG/1
10 TABLET ORAL DAILY
Qty: 30 TABLET | Refills: 2 | Status: SHIPPED | OUTPATIENT
Start: 2021-06-09 | End: 2023-05-04

## 2021-06-09 RX ORDER — ONDANSETRON 2 MG/ML
4 INJECTION INTRAMUSCULAR; INTRAVENOUS ONCE
Status: COMPLETED | OUTPATIENT
Start: 2021-06-09 | End: 2021-06-09

## 2021-06-09 RX ORDER — KETOROLAC TROMETHAMINE 30 MG/ML
10 INJECTION, SOLUTION INTRAMUSCULAR; INTRAVENOUS EVERY 6 HOURS PRN
Status: DISCONTINUED | OUTPATIENT
Start: 2021-06-09 | End: 2021-06-09 | Stop reason: HOSPADM

## 2021-06-09 RX ORDER — ONDANSETRON 4 MG/1
4 TABLET, FILM COATED ORAL EVERY 6 HOURS PRN
Qty: 12 TABLET | Refills: 0 | Status: ON HOLD | OUTPATIENT
Start: 2021-06-09 | End: 2021-06-24

## 2021-06-09 RX ORDER — ONDANSETRON 2 MG/ML
4 INJECTION INTRAMUSCULAR; INTRAVENOUS EVERY 8 HOURS PRN
Status: DISCONTINUED | OUTPATIENT
Start: 2021-06-09 | End: 2021-06-09 | Stop reason: HOSPADM

## 2021-06-09 RX ADMIN — LABETALOL HYDROCHLORIDE 10 MG: 5 INJECTION, SOLUTION INTRAVENOUS at 04:06

## 2021-06-09 RX ADMIN — ONDANSETRON 6 MG: 2 INJECTION INTRAMUSCULAR; INTRAVENOUS at 12:06

## 2021-06-09 RX ADMIN — AMLODIPINE BESYLATE 5 MG: 5 TABLET ORAL at 02:06

## 2021-06-09 RX ADMIN — IOHEXOL 75 ML: 350 INJECTION, SOLUTION INTRAVENOUS at 01:06

## 2021-06-09 RX ADMIN — CEFTRIAXONE 1 G: 1 INJECTION, POWDER, FOR SOLUTION INTRAMUSCULAR; INTRAVENOUS at 04:06

## 2021-06-09 RX ADMIN — MORPHINE SULFATE 4 MG: 4 INJECTION INTRAVENOUS at 12:06

## 2021-06-09 RX ADMIN — SODIUM CHLORIDE 1000 ML: 0.9 INJECTION, SOLUTION INTRAVENOUS at 12:06

## 2021-06-09 RX ADMIN — ONDANSETRON 4 MG: 2 INJECTION INTRAMUSCULAR; INTRAVENOUS at 08:06

## 2021-06-09 RX ADMIN — ONDANSETRON 4 MG: 2 INJECTION INTRAMUSCULAR; INTRAVENOUS at 03:06

## 2021-06-10 LAB
BACTERIA UR CULT: NORMAL
BACTERIA UR CULT: NORMAL

## 2021-06-14 ENCOUNTER — NURSE TRIAGE (OUTPATIENT)
Dept: ADMINISTRATIVE | Facility: CLINIC | Age: 61
End: 2021-06-14

## 2021-06-15 ENCOUNTER — TELEPHONE (OUTPATIENT)
Dept: FAMILY MEDICINE | Facility: CLINIC | Age: 61
End: 2021-06-15

## 2021-06-23 ENCOUNTER — HOSPITAL ENCOUNTER (INPATIENT)
Facility: HOSPITAL | Age: 61
LOS: 3 days | Discharge: HOME | DRG: 872 | End: 2021-06-26
Attending: EMERGENCY MEDICINE | Admitting: INTERNAL MEDICINE
Payer: MEDICAID

## 2021-06-23 DIAGNOSIS — R65.20 SEVERE SEPSIS: ICD-10-CM

## 2021-06-23 DIAGNOSIS — A41.9 SEPSIS, DUE TO UNSPECIFIED ORGANISM, UNSPECIFIED WHETHER ACUTE ORGAN DYSFUNCTION PRESENT: ICD-10-CM

## 2021-06-23 DIAGNOSIS — R42 POSTURAL DIZZINESS WITH PRESYNCOPE: ICD-10-CM

## 2021-06-23 DIAGNOSIS — A41.9 SEVERE SEPSIS: ICD-10-CM

## 2021-06-23 DIAGNOSIS — N17.9 AKI (ACUTE KIDNEY INJURY): ICD-10-CM

## 2021-06-23 DIAGNOSIS — I10 ESSENTIAL HYPERTENSION: ICD-10-CM

## 2021-06-23 DIAGNOSIS — R79.89 ELEVATED TROPONIN: ICD-10-CM

## 2021-06-23 DIAGNOSIS — F41.9 ANXIETY DISORDER, UNSPECIFIED TYPE: ICD-10-CM

## 2021-06-23 DIAGNOSIS — R19.7 DIARRHEA, UNSPECIFIED TYPE: ICD-10-CM

## 2021-06-23 DIAGNOSIS — F17.200 TOBACCO USE DISORDER: ICD-10-CM

## 2021-06-23 DIAGNOSIS — K52.9 ACUTE COLITIS: ICD-10-CM

## 2021-06-23 DIAGNOSIS — R65.10 SYSTEMIC INFLAMMATORY RESPONSE SYNDROME (SIRS): ICD-10-CM

## 2021-06-23 DIAGNOSIS — Z87.19 HISTORY OF ACUTE PANCREATITIS: ICD-10-CM

## 2021-06-23 DIAGNOSIS — R07.9 CHEST PAIN: ICD-10-CM

## 2021-06-23 DIAGNOSIS — A41.9 SEPSIS: ICD-10-CM

## 2021-06-23 DIAGNOSIS — E86.0 DEHYDRATION: Primary | ICD-10-CM

## 2021-06-23 DIAGNOSIS — R53.1 WEAKNESS: ICD-10-CM

## 2021-06-23 DIAGNOSIS — R19.7 DIARRHEA OF PRESUMED INFECTIOUS ORIGIN: ICD-10-CM

## 2021-06-23 DIAGNOSIS — E87.20 LACTIC ACIDOSIS: ICD-10-CM

## 2021-06-23 DIAGNOSIS — Z90.49 S/P LAPAROSCOPIC CHOLECYSTECTOMY: ICD-10-CM

## 2021-06-23 DIAGNOSIS — R55 POSTURAL DIZZINESS WITH PRESYNCOPE: ICD-10-CM

## 2021-06-23 DIAGNOSIS — R10.9 ABDOMINAL PAIN, UNSPECIFIED ABDOMINAL LOCATION: ICD-10-CM

## 2021-06-23 DIAGNOSIS — K85.10 GALLSTONE PANCREATITIS: ICD-10-CM

## 2021-06-23 PROBLEM — R74.8 ABNORMAL SERUM LEVEL OF LIPASE: Status: ACTIVE | Noted: 2021-06-06

## 2021-06-23 LAB
ALBUMIN SERPL BCP-MCNC: 3.7 G/DL (ref 3.5–5.2)
ALP SERPL-CCNC: 251 U/L (ref 55–135)
ALT SERPL W/O P-5'-P-CCNC: 105 U/L (ref 10–44)
AMPHET+METHAMPHET UR QL: NEGATIVE
ANION GAP SERPL CALC-SCNC: 21 MMOL/L (ref 8–16)
ANISOCYTOSIS BLD QL SMEAR: SLIGHT
AST SERPL-CCNC: 97 U/L (ref 10–40)
BARBITURATES UR QL SCN>200 NG/ML: NEGATIVE
BASOPHILS # BLD AUTO: 0.04 K/UL (ref 0–0.2)
BASOPHILS NFR BLD: 0.2 % (ref 0–1.9)
BENZODIAZ UR QL SCN>200 NG/ML: NORMAL
BILIRUB SERPL-MCNC: 0.7 MG/DL (ref 0.1–1)
BILIRUB UR QL STRIP: NEGATIVE
BNP SERPL-MCNC: 14 PG/ML (ref 0–99)
BUN SERPL-MCNC: 18 MG/DL (ref 6–20)
BUN SERPL-MCNC: 20 MG/DL (ref 6–30)
BZE UR QL SCN: NEGATIVE
CALCIUM SERPL-MCNC: 11.4 MG/DL (ref 8.7–10.5)
CANNABINOIDS UR QL SCN: NEGATIVE
CHLORIDE SERPL-SCNC: 97 MMOL/L (ref 95–110)
CHLORIDE SERPL-SCNC: 98 MMOL/L (ref 95–110)
CLARITY UR REFRACT.AUTO: CLEAR
CO2 SERPL-SCNC: 19 MMOL/L (ref 23–29)
COLOR UR AUTO: YELLOW
CREAT SERPL-MCNC: 1.2 MG/DL (ref 0.5–1.4)
CREAT SERPL-MCNC: 1.2 MG/DL (ref 0.5–1.4)
CREAT UR-MCNC: 53 MG/DL (ref 15–325)
CTP QC/QA: YES
DIFFERENTIAL METHOD: ABNORMAL
EOSINOPHIL # BLD AUTO: 0 K/UL (ref 0–0.5)
EOSINOPHIL NFR BLD: 0.1 % (ref 0–8)
ERYTHROCYTE [DISTWIDTH] IN BLOOD BY AUTOMATED COUNT: 11.9 % (ref 11.5–14.5)
EST. GFR  (AFRICAN AMERICAN): 56.8 ML/MIN/1.73 M^2
EST. GFR  (NON AFRICAN AMERICAN): 49.2 ML/MIN/1.73 M^2
GLUCOSE SERPL-MCNC: 172 MG/DL (ref 70–110)
GLUCOSE SERPL-MCNC: 176 MG/DL (ref 70–110)
GLUCOSE UR QL STRIP: NEGATIVE
HCT VFR BLD AUTO: 39.8 % (ref 37–48.5)
HCT VFR BLD CALC: 39 %PCV (ref 36–54)
HGB BLD-MCNC: 13.7 G/DL (ref 12–16)
HGB UR QL STRIP: NEGATIVE
IMM GRANULOCYTES # BLD AUTO: 0.14 K/UL (ref 0–0.04)
IMM GRANULOCYTES NFR BLD AUTO: 0.6 % (ref 0–0.5)
KETONES UR QL STRIP: NEGATIVE
LACTATE SERPL-SCNC: 3.1 MMOL/L (ref 0.5–2.2)
LACTATE SERPL-SCNC: 6.3 MMOL/L (ref 0.5–2.2)
LEUKOCYTE ESTERASE UR QL STRIP: NEGATIVE
LIPASE SERPL-CCNC: 173 U/L (ref 4–60)
LYMPHOCYTES # BLD AUTO: 1.6 K/UL (ref 1–4.8)
LYMPHOCYTES NFR BLD: 6.6 % (ref 18–48)
MAGNESIUM SERPL-MCNC: 1.6 MG/DL (ref 1.6–2.6)
MCH RBC QN AUTO: 28.1 PG (ref 27–31)
MCHC RBC AUTO-ENTMCNC: 34.4 G/DL (ref 32–36)
MCV RBC AUTO: 82 FL (ref 82–98)
METHADONE UR QL SCN>300 NG/ML: NEGATIVE
MONOCYTES # BLD AUTO: 1.1 K/UL (ref 0.3–1)
MONOCYTES NFR BLD: 4.7 % (ref 4–15)
NEUTROPHILS # BLD AUTO: 20.9 K/UL (ref 1.8–7.7)
NEUTROPHILS NFR BLD: 87.8 % (ref 38–73)
NITRITE UR QL STRIP: NEGATIVE
NRBC BLD-RTO: 0 /100 WBC
OPIATES UR QL SCN: NEGATIVE
OVALOCYTES BLD QL SMEAR: ABNORMAL
PCP UR QL SCN>25 NG/ML: NEGATIVE
PH UR STRIP: 7 [PH] (ref 5–8)
PLATELET # BLD AUTO: 422 K/UL (ref 150–450)
PLATELET BLD QL SMEAR: ABNORMAL
PMV BLD AUTO: 10.6 FL (ref 9.2–12.9)
POC IONIZED CALCIUM: 1.21 MMOL/L (ref 1.06–1.42)
POC TCO2 (MEASURED): 22 MMOL/L (ref 23–29)
POIKILOCYTOSIS BLD QL SMEAR: SLIGHT
POTASSIUM BLD-SCNC: 4.2 MMOL/L (ref 3.5–5.1)
POTASSIUM SERPL-SCNC: 4.4 MMOL/L (ref 3.5–5.1)
PROCALCITONIN SERPL IA-MCNC: 0.91 NG/ML
PROT SERPL-MCNC: 7.5 G/DL (ref 6–8.4)
PROT UR QL STRIP: NEGATIVE
RBC # BLD AUTO: 4.88 M/UL (ref 4–5.4)
SAMPLE: ABNORMAL
SARS-COV-2 RDRP RESP QL NAA+PROBE: NEGATIVE
SODIUM BLD-SCNC: 137 MMOL/L (ref 136–145)
SODIUM SERPL-SCNC: 138 MMOL/L (ref 136–145)
SP GR UR STRIP: 1.01 (ref 1–1.03)
TOXICOLOGY INFORMATION: NORMAL
TROPONIN I SERPL DL<=0.01 NG/ML-MCNC: 0.16 NG/ML (ref 0–0.03)
URN SPEC COLLECT METH UR: NORMAL
WBC # BLD AUTO: 23.82 K/UL (ref 3.9–12.7)

## 2021-06-23 PROCEDURE — 99291 PR CRITICAL CARE, E/M 30-74 MINUTES: ICD-10-PCS | Mod: CS,,, | Performed by: EMERGENCY MEDICINE

## 2021-06-23 PROCEDURE — 83605 ASSAY OF LACTIC ACID: CPT | Mod: 91 | Performed by: EMERGENCY MEDICINE

## 2021-06-23 PROCEDURE — 83735 ASSAY OF MAGNESIUM: CPT | Performed by: EMERGENCY MEDICINE

## 2021-06-23 PROCEDURE — 96365 THER/PROPH/DIAG IV INF INIT: CPT

## 2021-06-23 PROCEDURE — 80053 COMPREHEN METABOLIC PANEL: CPT | Performed by: EMERGENCY MEDICINE

## 2021-06-23 PROCEDURE — 63600175 PHARM REV CODE 636 W HCPCS: Performed by: EMERGENCY MEDICINE

## 2021-06-23 PROCEDURE — 99900035 HC TECH TIME PER 15 MIN (STAT)

## 2021-06-23 PROCEDURE — 84484 ASSAY OF TROPONIN QUANT: CPT | Performed by: EMERGENCY MEDICINE

## 2021-06-23 PROCEDURE — 82803 BLOOD GASES ANY COMBINATION: CPT

## 2021-06-23 PROCEDURE — 63600175 PHARM REV CODE 636 W HCPCS: Performed by: NURSE PRACTITIONER

## 2021-06-23 PROCEDURE — 99284 EMERGENCY DEPT VISIT MOD MDM: CPT | Mod: ,,, | Performed by: NURSE PRACTITIONER

## 2021-06-23 PROCEDURE — 93010 EKG 12-LEAD: ICD-10-PCS | Mod: ,,, | Performed by: INTERNAL MEDICINE

## 2021-06-23 PROCEDURE — 96367 TX/PROPH/DG ADDL SEQ IV INF: CPT

## 2021-06-23 PROCEDURE — 89055 LEUKOCYTE ASSESSMENT FECAL: CPT | Performed by: STUDENT IN AN ORGANIZED HEALTH CARE EDUCATION/TRAINING PROGRAM

## 2021-06-23 PROCEDURE — 93010 ELECTROCARDIOGRAM REPORT: CPT | Mod: ,,, | Performed by: INTERNAL MEDICINE

## 2021-06-23 PROCEDURE — 25500020 PHARM REV CODE 255: Performed by: EMERGENCY MEDICINE

## 2021-06-23 PROCEDURE — 99284 PR EMERGENCY DEPT VISIT,LEVEL IV: ICD-10-PCS | Mod: ,,, | Performed by: NURSE PRACTITIONER

## 2021-06-23 PROCEDURE — 87040 BLOOD CULTURE FOR BACTERIA: CPT | Performed by: EMERGENCY MEDICINE

## 2021-06-23 PROCEDURE — 87449 NOS EACH ORGANISM AG IA: CPT | Performed by: EMERGENCY MEDICINE

## 2021-06-23 PROCEDURE — 99291 CRITICAL CARE FIRST HOUR: CPT | Mod: 25

## 2021-06-23 PROCEDURE — 81003 URINALYSIS AUTO W/O SCOPE: CPT | Mod: 59 | Performed by: EMERGENCY MEDICINE

## 2021-06-23 PROCEDURE — 80047 BASIC METABLC PNL IONIZED CA: CPT | Mod: 59

## 2021-06-23 PROCEDURE — 96361 HYDRATE IV INFUSION ADD-ON: CPT

## 2021-06-23 PROCEDURE — 12000002 HC ACUTE/MED SURGE SEMI-PRIVATE ROOM

## 2021-06-23 PROCEDURE — 83690 ASSAY OF LIPASE: CPT | Performed by: EMERGENCY MEDICINE

## 2021-06-23 PROCEDURE — 84145 PROCALCITONIN (PCT): CPT | Performed by: EMERGENCY MEDICINE

## 2021-06-23 PROCEDURE — 82800 BLOOD PH: CPT

## 2021-06-23 PROCEDURE — 25000003 PHARM REV CODE 250: Performed by: EMERGENCY MEDICINE

## 2021-06-23 PROCEDURE — 83880 ASSAY OF NATRIURETIC PEPTIDE: CPT | Performed by: EMERGENCY MEDICINE

## 2021-06-23 PROCEDURE — 99291 CRITICAL CARE FIRST HOUR: CPT | Mod: CS,,, | Performed by: EMERGENCY MEDICINE

## 2021-06-23 PROCEDURE — 87324 CLOSTRIDIUM AG IA: CPT | Performed by: EMERGENCY MEDICINE

## 2021-06-23 PROCEDURE — 83605 ASSAY OF LACTIC ACID: CPT

## 2021-06-23 PROCEDURE — 93005 ELECTROCARDIOGRAM TRACING: CPT

## 2021-06-23 PROCEDURE — U0002 COVID-19 LAB TEST NON-CDC: HCPCS | Performed by: EMERGENCY MEDICINE

## 2021-06-23 PROCEDURE — 80307 DRUG TEST PRSMV CHEM ANLYZR: CPT | Performed by: EMERGENCY MEDICINE

## 2021-06-23 PROCEDURE — 85025 COMPLETE CBC W/AUTO DIFF WBC: CPT | Performed by: EMERGENCY MEDICINE

## 2021-06-23 PROCEDURE — 87329 GIARDIA AG IA: CPT | Performed by: STUDENT IN AN ORGANIZED HEALTH CARE EDUCATION/TRAINING PROGRAM

## 2021-06-23 RX ORDER — SODIUM CHLORIDE 0.9 % (FLUSH) 0.9 %
10 SYRINGE (ML) INJECTION
Status: CANCELLED | OUTPATIENT
Start: 2021-06-23

## 2021-06-23 RX ORDER — TALC
6 POWDER (GRAM) TOPICAL NIGHTLY PRN
Status: CANCELLED | OUTPATIENT
Start: 2021-06-23

## 2021-06-23 RX ADMIN — PIPERACILLIN SODIUM AND TAZOBACTAM SODIUM 4.5 G: 4; .5 INJECTION, POWDER, FOR SOLUTION INTRAVENOUS at 06:06

## 2021-06-23 RX ADMIN — IOHEXOL 75 ML: 350 INJECTION, SOLUTION INTRAVENOUS at 06:06

## 2021-06-23 RX ADMIN — SODIUM CHLORIDE, SODIUM LACTATE, POTASSIUM CHLORIDE, AND CALCIUM CHLORIDE 1000 ML: .6; .31; .03; .02 INJECTION, SOLUTION INTRAVENOUS at 09:06

## 2021-06-23 RX ADMIN — VANCOMYCIN HYDROCHLORIDE 2000 MG: 500 INJECTION, POWDER, LYOPHILIZED, FOR SOLUTION INTRAVENOUS at 07:06

## 2021-06-24 PROBLEM — F17.200 TOBACCO USE DISORDER: Status: ACTIVE | Noted: 2021-06-24

## 2021-06-24 PROBLEM — A09 INFECTIOUS DIARRHEA: Status: ACTIVE | Noted: 2021-06-24

## 2021-06-24 PROBLEM — G89.18 POST-OP PAIN: Status: RESOLVED | Noted: 2021-06-09 | Resolved: 2021-06-24

## 2021-06-24 PROBLEM — R65.20 SEVERE SEPSIS: Status: ACTIVE | Noted: 2021-06-23

## 2021-06-24 PROBLEM — R10.9 ABDOMINAL PAIN: Status: RESOLVED | Noted: 2021-06-04 | Resolved: 2021-06-24

## 2021-06-24 PROBLEM — R11.10 VOMITING: Status: RESOLVED | Noted: 2021-06-09 | Resolved: 2021-06-24

## 2021-06-24 PROBLEM — Z90.49 S/P LAPAROSCOPIC CHOLECYSTECTOMY: Status: ACTIVE | Noted: 2021-06-24

## 2021-06-24 PROBLEM — R74.8 ABNORMAL SERUM LEVEL OF LIPASE: Status: RESOLVED | Noted: 2021-06-06 | Resolved: 2021-06-24

## 2021-06-24 PROBLEM — F41.9 ANXIETY DISORDER: Status: ACTIVE | Noted: 2021-06-24

## 2021-06-24 LAB
ALBUMIN SERPL BCP-MCNC: 2.9 G/DL (ref 3.5–5.2)
ALLENS TEST: ABNORMAL
ALLENS TEST: ABNORMAL
ALP SERPL-CCNC: 175 U/L (ref 55–135)
ALT SERPL W/O P-5'-P-CCNC: 88 U/L (ref 10–44)
ANION GAP SERPL CALC-SCNC: 12 MMOL/L (ref 8–16)
ANISOCYTOSIS BLD QL SMEAR: SLIGHT
ASCENDING AORTA: 2.39 CM
AST SERPL-CCNC: 84 U/L (ref 10–40)
AV INDEX (PROSTH): 0.94
AV MEAN GRADIENT: 11 MMHG
AV PEAK GRADIENT: 16 MMHG
AV VALVE AREA: 3.03 CM2
AV VELOCITY RATIO: 0.99
BASOPHILS # BLD AUTO: 0.03 K/UL (ref 0–0.2)
BASOPHILS NFR BLD: 0.2 % (ref 0–1.9)
BILIRUB SERPL-MCNC: 0.7 MG/DL (ref 0.1–1)
BSA FOR ECHO PROCEDURE: 1.94 M2
BUN SERPL-MCNC: 20 MG/DL (ref 6–20)
C DIFF GDH STL QL: NEGATIVE
C DIFF TOX A+B STL QL IA: NEGATIVE
CALCIUM SERPL-MCNC: 9.6 MG/DL (ref 8.7–10.5)
CHLORIDE SERPL-SCNC: 100 MMOL/L (ref 95–110)
CO2 SERPL-SCNC: 23 MMOL/L (ref 23–29)
CREAT SERPL-MCNC: 0.8 MG/DL (ref 0.5–1.4)
CRYPTOSP AG STL QL IA: NEGATIVE
CV ECHO LV RWT: 0.47 CM
DELSYS: ABNORMAL
DELSYS: ABNORMAL
DIFFERENTIAL METHOD: ABNORMAL
DOP CALC AO PEAK VEL: 1.98 M/S
DOP CALC AO VTI: 29 CM
DOP CALC LVOT AREA: 3.2 CM2
DOP CALC LVOT DIAMETER: 2.03 CM
DOP CALC LVOT PEAK VEL: 1.96 M/S
DOP CALC LVOT STROKE VOLUME: 87.83 CM3
DOP CALCLVOT PEAK VEL VTI: 27.15 CM
E WAVE DECELERATION TIME: 142.48 MSEC
E/A RATIO: 0.97
E/E' RATIO: 7.82 M/S
ECHO LV POSTERIOR WALL: 0.98 CM (ref 0.6–1.1)
EJECTION FRACTION: 63 %
EOSINOPHIL # BLD AUTO: 0 K/UL (ref 0–0.5)
EOSINOPHIL NFR BLD: 0.1 % (ref 0–8)
ERYTHROCYTE [DISTWIDTH] IN BLOOD BY AUTOMATED COUNT: 11.8 % (ref 11.5–14.5)
EST. GFR  (AFRICAN AMERICAN): >60 ML/MIN/1.73 M^2
EST. GFR  (NON AFRICAN AMERICAN): >60 ML/MIN/1.73 M^2
FRACTIONAL SHORTENING: 45 % (ref 28–44)
G LAMBLIA AG STL QL IA: NEGATIVE
GLUCOSE SERPL-MCNC: 115 MG/DL (ref 70–110)
HCO3 UR-SCNC: 25.1 MMOL/L (ref 24–28)
HCT VFR BLD AUTO: 33.3 % (ref 37–48.5)
HGB BLD-MCNC: 11.1 G/DL (ref 12–16)
IMM GRANULOCYTES # BLD AUTO: 0.06 K/UL (ref 0–0.04)
IMM GRANULOCYTES NFR BLD AUTO: 0.4 % (ref 0–0.5)
INTERVENTRICULAR SEPTUM: 1.12 CM (ref 0.6–1.1)
LA MAJOR: 5.35 CM
LA MINOR: 4.78 CM
LA WIDTH: 4.46 CM
LACTATE SERPL-SCNC: 1.5 MMOL/L (ref 0.5–2.2)
LDH SERPL L TO P-CCNC: 3.16 MMOL/L (ref 0.5–2.2)
LEFT ATRIUM SIZE: 3.26 CM
LEFT ATRIUM VOLUME INDEX: 33 ML/M2
LEFT ATRIUM VOLUME: 62.4 CM3
LEFT INTERNAL DIMENSION IN SYSTOLE: 2.3 CM (ref 2.1–4)
LEFT VENTRICLE DIASTOLIC VOLUME INDEX: 40.42 ML/M2
LEFT VENTRICLE DIASTOLIC VOLUME: 76.4 ML
LEFT VENTRICLE MASS INDEX: 76 G/M2
LEFT VENTRICLE SYSTOLIC VOLUME INDEX: 9.6 ML/M2
LEFT VENTRICLE SYSTOLIC VOLUME: 18.19 ML
LEFT VENTRICULAR INTERNAL DIMENSION IN DIASTOLE: 4.15 CM (ref 3.5–6)
LEFT VENTRICULAR MASS: 144.26 G
LV LATERAL E/E' RATIO: 6.62 M/S
LV SEPTAL E/E' RATIO: 9.56 M/S
LYMPHOCYTES # BLD AUTO: 1 K/UL (ref 1–4.8)
LYMPHOCYTES NFR BLD: 7.3 % (ref 18–48)
MAGNESIUM SERPL-MCNC: 1.7 MG/DL (ref 1.6–2.6)
MCH RBC QN AUTO: 27.4 PG (ref 27–31)
MCHC RBC AUTO-ENTMCNC: 33.3 G/DL (ref 32–36)
MCV RBC AUTO: 82 FL (ref 82–98)
MODE: ABNORMAL
MODE: ABNORMAL
MONOCYTES # BLD AUTO: 1.2 K/UL (ref 0.3–1)
MONOCYTES NFR BLD: 8.6 % (ref 4–15)
MV PEAK A VEL: 0.89 M/S
MV PEAK E VEL: 0.86 M/S
MV STENOSIS PRESSURE HALF TIME: 41.32 MS
MV VALVE AREA P 1/2 METHOD: 5.32 CM2
NEUTROPHILS # BLD AUTO: 12 K/UL (ref 1.8–7.7)
NEUTROPHILS NFR BLD: 83.4 % (ref 38–73)
NRBC BLD-RTO: 0 /100 WBC
OVALOCYTES BLD QL SMEAR: ABNORMAL
PCO2 BLDA: 36.5 MMHG (ref 35–45)
PH SMN: 7.45 [PH] (ref 7.35–7.45)
PHOSPHATE SERPL-MCNC: 5.6 MG/DL (ref 2.7–4.5)
PISA TR MAX VEL: 2.7 M/S
PLATELET # BLD AUTO: 266 K/UL (ref 150–450)
PLATELET BLD QL SMEAR: ABNORMAL
PMV BLD AUTO: 10.6 FL (ref 9.2–12.9)
PO2 BLDA: 35 MMHG (ref 40–60)
POC BE: 1 MMOL/L
POC SATURATED O2: 69 % (ref 95–100)
POC TCO2: 26 MMOL/L (ref 24–29)
POIKILOCYTOSIS BLD QL SMEAR: SLIGHT
POTASSIUM SERPL-SCNC: 3.6 MMOL/L (ref 3.5–5.1)
PROT SERPL-MCNC: 6 G/DL (ref 6–8.4)
RA MAJOR: 5.31 CM
RA PRESSURE: 3 MMHG
RA WIDTH: 3.22 CM
RBC # BLD AUTO: 4.05 M/UL (ref 4–5.4)
RIGHT VENTRICULAR END-DIASTOLIC DIMENSION: 3 CM
SAMPLE: ABNORMAL
SAMPLE: ABNORMAL
SINUS: 2.39 CM
SITE: ABNORMAL
SITE: ABNORMAL
SODIUM SERPL-SCNC: 135 MMOL/L (ref 136–145)
STJ: 2.29 CM
TDI LATERAL: 0.13 M/S
TDI SEPTAL: 0.09 M/S
TDI: 0.11 M/S
TR MAX PG: 29 MMHG
TRICUSPID ANNULAR PLANE SYSTOLIC EXCURSION: 2.39 CM
TROPONIN I SERPL DL<=0.01 NG/ML-MCNC: 0.13 NG/ML (ref 0–0.03)
TROPONIN I SERPL DL<=0.01 NG/ML-MCNC: 0.28 NG/ML (ref 0–0.03)
TV REST PULMONARY ARTERY PRESSURE: 32 MMHG
WBC # BLD AUTO: 14.34 K/UL (ref 3.9–12.7)
WBC #/AREA STL HPF: NORMAL /[HPF]

## 2021-06-24 PROCEDURE — 36415 COLL VENOUS BLD VENIPUNCTURE: CPT | Performed by: STUDENT IN AN ORGANIZED HEALTH CARE EDUCATION/TRAINING PROGRAM

## 2021-06-24 PROCEDURE — 25000003 PHARM REV CODE 250: Performed by: STUDENT IN AN ORGANIZED HEALTH CARE EDUCATION/TRAINING PROGRAM

## 2021-06-24 PROCEDURE — 84484 ASSAY OF TROPONIN QUANT: CPT | Mod: 91 | Performed by: STUDENT IN AN ORGANIZED HEALTH CARE EDUCATION/TRAINING PROGRAM

## 2021-06-24 PROCEDURE — 99223 1ST HOSP IP/OBS HIGH 75: CPT | Mod: ,,, | Performed by: INTERNAL MEDICINE

## 2021-06-24 PROCEDURE — 83735 ASSAY OF MAGNESIUM: CPT | Performed by: STUDENT IN AN ORGANIZED HEALTH CARE EDUCATION/TRAINING PROGRAM

## 2021-06-24 PROCEDURE — 80053 COMPREHEN METABOLIC PANEL: CPT | Performed by: STUDENT IN AN ORGANIZED HEALTH CARE EDUCATION/TRAINING PROGRAM

## 2021-06-24 PROCEDURE — 97165 OT EVAL LOW COMPLEX 30 MIN: CPT

## 2021-06-24 PROCEDURE — 87045 FECES CULTURE AEROBIC BACT: CPT | Performed by: STUDENT IN AN ORGANIZED HEALTH CARE EDUCATION/TRAINING PROGRAM

## 2021-06-24 PROCEDURE — 84100 ASSAY OF PHOSPHORUS: CPT | Performed by: STUDENT IN AN ORGANIZED HEALTH CARE EDUCATION/TRAINING PROGRAM

## 2021-06-24 PROCEDURE — 84484 ASSAY OF TROPONIN QUANT: CPT | Performed by: STUDENT IN AN ORGANIZED HEALTH CARE EDUCATION/TRAINING PROGRAM

## 2021-06-24 PROCEDURE — 97535 SELF CARE MNGMENT TRAINING: CPT

## 2021-06-24 PROCEDURE — 85025 COMPLETE CBC W/AUTO DIFF WBC: CPT | Performed by: STUDENT IN AN ORGANIZED HEALTH CARE EDUCATION/TRAINING PROGRAM

## 2021-06-24 PROCEDURE — 87046 STOOL CULTR AEROBIC BACT EA: CPT | Mod: 59 | Performed by: STUDENT IN AN ORGANIZED HEALTH CARE EDUCATION/TRAINING PROGRAM

## 2021-06-24 PROCEDURE — 63600175 PHARM REV CODE 636 W HCPCS: Performed by: STUDENT IN AN ORGANIZED HEALTH CARE EDUCATION/TRAINING PROGRAM

## 2021-06-24 PROCEDURE — 99223 PR INITIAL HOSPITAL CARE,LEVL III: ICD-10-PCS | Mod: ,,, | Performed by: INTERNAL MEDICINE

## 2021-06-24 PROCEDURE — 87427 SHIGA-LIKE TOXIN AG IA: CPT | Performed by: STUDENT IN AN ORGANIZED HEALTH CARE EDUCATION/TRAINING PROGRAM

## 2021-06-24 PROCEDURE — 83605 ASSAY OF LACTIC ACID: CPT | Performed by: STUDENT IN AN ORGANIZED HEALTH CARE EDUCATION/TRAINING PROGRAM

## 2021-06-24 PROCEDURE — 20600001 HC STEP DOWN PRIVATE ROOM

## 2021-06-24 RX ORDER — METRONIDAZOLE 500 MG/1
500 TABLET ORAL EVERY 8 HOURS
Status: DISCONTINUED | OUTPATIENT
Start: 2021-06-24 | End: 2021-06-25

## 2021-06-24 RX ORDER — HEPARIN SODIUM 5000 [USP'U]/ML
5000 INJECTION, SOLUTION INTRAVENOUS; SUBCUTANEOUS EVERY 8 HOURS
Status: DISCONTINUED | OUTPATIENT
Start: 2021-06-24 | End: 2021-06-26 | Stop reason: HOSPADM

## 2021-06-24 RX ORDER — PAROXETINE 10 MG/1
10 TABLET, FILM COATED ORAL EVERY MORNING
Status: DISCONTINUED | OUTPATIENT
Start: 2021-06-24 | End: 2021-06-26 | Stop reason: HOSPADM

## 2021-06-24 RX ORDER — ATORVASTATIN CALCIUM 40 MG/1
40 TABLET, FILM COATED ORAL DAILY
COMMUNITY
Start: 2021-06-08

## 2021-06-24 RX ORDER — ONDANSETRON 4 MG/1
4 TABLET, ORALLY DISINTEGRATING ORAL EVERY 8 HOURS PRN
Status: DISCONTINUED | OUTPATIENT
Start: 2021-06-24 | End: 2021-06-26 | Stop reason: HOSPADM

## 2021-06-24 RX ORDER — GLYBURIDE 5 MG/1
5 TABLET ORAL NIGHTLY
COMMUNITY
Start: 2021-06-22 | End: 2021-10-19

## 2021-06-24 RX ORDER — IBUPROFEN 200 MG
16 TABLET ORAL
Status: DISCONTINUED | OUTPATIENT
Start: 2021-06-24 | End: 2021-06-26 | Stop reason: HOSPADM

## 2021-06-24 RX ORDER — TALC
6 POWDER (GRAM) TOPICAL NIGHTLY PRN
Status: DISCONTINUED | OUTPATIENT
Start: 2021-06-24 | End: 2021-06-26 | Stop reason: HOSPADM

## 2021-06-24 RX ORDER — PANTOPRAZOLE SODIUM 40 MG/1
40 TABLET, DELAYED RELEASE ORAL DAILY
Status: ON HOLD | COMMUNITY
Start: 2021-06-08 | End: 2021-08-19 | Stop reason: HOSPADM

## 2021-06-24 RX ORDER — SODIUM CHLORIDE 0.9 % (FLUSH) 0.9 %
10 SYRINGE (ML) INJECTION
Status: DISCONTINUED | OUTPATIENT
Start: 2021-06-24 | End: 2021-06-26 | Stop reason: HOSPADM

## 2021-06-24 RX ORDER — AMLODIPINE BESYLATE 10 MG/1
10 TABLET ORAL DAILY
Status: DISCONTINUED | OUTPATIENT
Start: 2021-06-24 | End: 2021-06-26 | Stop reason: HOSPADM

## 2021-06-24 RX ORDER — BUTALBITAL, ACETAMINOPHEN AND CAFFEINE 50; 325; 40 MG/1; MG/1; MG/1
1 TABLET ORAL 2 TIMES DAILY PRN
Status: ON HOLD | COMMUNITY
Start: 2021-05-22 | End: 2021-08-19 | Stop reason: HOSPADM

## 2021-06-24 RX ORDER — POTASSIUM CHLORIDE 20 MEQ/1
40 TABLET, EXTENDED RELEASE ORAL ONCE
Status: COMPLETED | OUTPATIENT
Start: 2021-06-24 | End: 2021-06-24

## 2021-06-24 RX ORDER — ALPRAZOLAM 1 MG/1
1 TABLET ORAL 2 TIMES DAILY PRN
Status: ON HOLD | COMMUNITY
Start: 2021-06-03 | End: 2021-08-19 | Stop reason: SDUPTHER

## 2021-06-24 RX ORDER — HYDROCODONE BITARTRATE AND ACETAMINOPHEN 10; 325 MG/1; MG/1
1 TABLET ORAL 4 TIMES DAILY PRN
Status: ON HOLD | COMMUNITY
Start: 2021-06-03 | End: 2021-08-19 | Stop reason: SDUPTHER

## 2021-06-24 RX ORDER — CHOLESTYRAMINE 4 G/9G
1 POWDER, FOR SUSPENSION ORAL 2 TIMES DAILY PRN
Status: DISCONTINUED | OUTPATIENT
Start: 2021-06-24 | End: 2021-06-25

## 2021-06-24 RX ORDER — ACETAMINOPHEN 325 MG/1
650 TABLET ORAL EVERY 6 HOURS PRN
Status: DISCONTINUED | OUTPATIENT
Start: 2021-06-24 | End: 2021-06-26 | Stop reason: HOSPADM

## 2021-06-24 RX ORDER — GLUCAGON 1 MG
1 KIT INJECTION
Status: DISCONTINUED | OUTPATIENT
Start: 2021-06-24 | End: 2021-06-26 | Stop reason: HOSPADM

## 2021-06-24 RX ORDER — HYDROCHLOROTHIAZIDE 50 MG/1
50 TABLET ORAL DAILY
Status: ON HOLD | COMMUNITY
Start: 2021-06-08 | End: 2021-08-19 | Stop reason: HOSPADM

## 2021-06-24 RX ORDER — IBUPROFEN 200 MG
24 TABLET ORAL
Status: DISCONTINUED | OUTPATIENT
Start: 2021-06-24 | End: 2021-06-26 | Stop reason: HOSPADM

## 2021-06-24 RX ORDER — AMITRIPTYLINE HYDROCHLORIDE 50 MG/1
50 TABLET, FILM COATED ORAL NIGHTLY
Status: DISCONTINUED | OUTPATIENT
Start: 2021-06-24 | End: 2021-06-26 | Stop reason: HOSPADM

## 2021-06-24 RX ORDER — MAGNESIUM SULFATE HEPTAHYDRATE 40 MG/ML
2 INJECTION, SOLUTION INTRAVENOUS ONCE
Status: COMPLETED | OUTPATIENT
Start: 2021-06-24 | End: 2021-06-24

## 2021-06-24 RX ADMIN — HEPARIN SODIUM 5000 UNITS: 5000 INJECTION INTRAVENOUS; SUBCUTANEOUS at 09:06

## 2021-06-24 RX ADMIN — ACETAMINOPHEN 650 MG: 325 TABLET ORAL at 08:06

## 2021-06-24 RX ADMIN — AMITRIPTYLINE HYDROCHLORIDE 50 MG: 50 TABLET, FILM COATED ORAL at 03:06

## 2021-06-24 RX ADMIN — POTASSIUM CHLORIDE 40 MEQ: 1500 TABLET, EXTENDED RELEASE ORAL at 03:06

## 2021-06-24 RX ADMIN — PAROXETINE HYDROCHLORIDE 10 MG: 10 TABLET, FILM COATED ORAL at 08:06

## 2021-06-24 RX ADMIN — MELATONIN TAB 3 MG 6 MG: 3 TAB at 09:06

## 2021-06-24 RX ADMIN — CHOLESTYRAMINE 4 G: 4 POWDER, FOR SUSPENSION ORAL at 09:06

## 2021-06-24 RX ADMIN — METRONIDAZOLE 500 MG: 500 TABLET ORAL at 03:06

## 2021-06-24 RX ADMIN — VANCOMYCIN HYDROCHLORIDE 125 MG: KIT at 03:06

## 2021-06-24 RX ADMIN — AMITRIPTYLINE HYDROCHLORIDE 50 MG: 50 TABLET, FILM COATED ORAL at 09:06

## 2021-06-24 RX ADMIN — METRONIDAZOLE 500 MG: 500 TABLET ORAL at 09:06

## 2021-06-24 RX ADMIN — MAGNESIUM SULFATE 2 G: 2 INJECTION INTRAVENOUS at 03:06

## 2021-06-24 RX ADMIN — HEPARIN SODIUM 5000 UNITS: 5000 INJECTION INTRAVENOUS; SUBCUTANEOUS at 02:06

## 2021-06-24 RX ADMIN — METRONIDAZOLE 500 MG: 500 TABLET ORAL at 02:06

## 2021-06-24 RX ADMIN — ONDANSETRON 4 MG: 4 TABLET, ORALLY DISINTEGRATING ORAL at 09:06

## 2021-06-24 RX ADMIN — AMLODIPINE BESYLATE 10 MG: 10 TABLET ORAL at 08:06

## 2021-06-24 RX ADMIN — HEPARIN SODIUM 5000 UNITS: 5000 INJECTION INTRAVENOUS; SUBCUTANEOUS at 06:06

## 2021-06-25 PROBLEM — K52.9 ACUTE COLITIS: Status: ACTIVE | Noted: 2021-06-23

## 2021-06-25 PROBLEM — Z87.19 HISTORY OF ACUTE PANCREATITIS: Status: ACTIVE | Noted: 2021-06-04

## 2021-06-25 PROBLEM — R65.20 SEVERE SEPSIS: Status: ACTIVE | Noted: 2021-06-25

## 2021-06-25 PROBLEM — A41.9 SEVERE SEPSIS: Status: ACTIVE | Noted: 2021-06-25

## 2021-06-25 PROBLEM — R65.10 SYSTEMIC INFLAMMATORY RESPONSE SYNDROME (SIRS): Status: ACTIVE | Noted: 2021-06-23

## 2021-06-25 PROBLEM — K52.9 ACUTE COLITIS: Status: ACTIVE | Noted: 2021-06-25

## 2021-06-25 LAB
ALBUMIN SERPL BCP-MCNC: 2.7 G/DL (ref 3.5–5.2)
ALP SERPL-CCNC: 145 U/L (ref 55–135)
ALT SERPL W/O P-5'-P-CCNC: 71 U/L (ref 10–44)
ANION GAP SERPL CALC-SCNC: 14 MMOL/L (ref 8–16)
AST SERPL-CCNC: 50 U/L (ref 10–40)
BASOPHILS # BLD AUTO: 0.02 K/UL (ref 0–0.2)
BASOPHILS NFR BLD: 0.2 % (ref 0–1.9)
BILIRUB SERPL-MCNC: 0.5 MG/DL (ref 0.1–1)
BUN SERPL-MCNC: 15 MG/DL (ref 6–20)
CALCIUM SERPL-MCNC: 9.4 MG/DL (ref 8.7–10.5)
CHLORIDE SERPL-SCNC: 101 MMOL/L (ref 95–110)
CO2 SERPL-SCNC: 21 MMOL/L (ref 23–29)
CREAT SERPL-MCNC: 0.6 MG/DL (ref 0.5–1.4)
CRP SERPL-MCNC: 115.5 MG/L (ref 0–8.2)
DIFFERENTIAL METHOD: ABNORMAL
E COLI SXT1 STL QL IA: NEGATIVE
E COLI SXT2 STL QL IA: NEGATIVE
EOSINOPHIL # BLD AUTO: 0.1 K/UL (ref 0–0.5)
EOSINOPHIL NFR BLD: 0.5 % (ref 0–8)
ERYTHROCYTE [DISTWIDTH] IN BLOOD BY AUTOMATED COUNT: 11.9 % (ref 11.5–14.5)
ERYTHROCYTE [SEDIMENTATION RATE] IN BLOOD BY WESTERGREN METHOD: 51 MM/HR (ref 0–36)
EST. GFR  (AFRICAN AMERICAN): >60 ML/MIN/1.73 M^2
EST. GFR  (NON AFRICAN AMERICAN): >60 ML/MIN/1.73 M^2
GLUCOSE SERPL-MCNC: 73 MG/DL (ref 70–110)
HCT VFR BLD AUTO: 31.4 % (ref 37–48.5)
HGB BLD-MCNC: 10.4 G/DL (ref 12–16)
IMM GRANULOCYTES # BLD AUTO: 0.06 K/UL (ref 0–0.04)
IMM GRANULOCYTES NFR BLD AUTO: 0.5 % (ref 0–0.5)
LYMPHOCYTES # BLD AUTO: 1.7 K/UL (ref 1–4.8)
LYMPHOCYTES NFR BLD: 14.3 % (ref 18–48)
MAGNESIUM SERPL-MCNC: 1.9 MG/DL (ref 1.6–2.6)
MCH RBC QN AUTO: 27.4 PG (ref 27–31)
MCHC RBC AUTO-ENTMCNC: 33.1 G/DL (ref 32–36)
MCV RBC AUTO: 83 FL (ref 82–98)
MONOCYTES # BLD AUTO: 1 K/UL (ref 0.3–1)
MONOCYTES NFR BLD: 8.5 % (ref 4–15)
NEUTROPHILS # BLD AUTO: 8.9 K/UL (ref 1.8–7.7)
NEUTROPHILS NFR BLD: 76 % (ref 38–73)
NRBC BLD-RTO: 0 /100 WBC
PHOSPHATE SERPL-MCNC: 3.1 MG/DL (ref 2.7–4.5)
PLATELET # BLD AUTO: 212 K/UL (ref 150–450)
PMV BLD AUTO: 11.4 FL (ref 9.2–12.9)
POTASSIUM SERPL-SCNC: 3.9 MMOL/L (ref 3.5–5.1)
PROCALCITONIN SERPL IA-MCNC: 1.74 NG/ML
PROT SERPL-MCNC: 6 G/DL (ref 6–8.4)
RBC # BLD AUTO: 3.8 M/UL (ref 4–5.4)
SODIUM SERPL-SCNC: 136 MMOL/L (ref 136–145)
WBC # BLD AUTO: 11.75 K/UL (ref 3.9–12.7)

## 2021-06-25 PROCEDURE — 99233 SBSQ HOSP IP/OBS HIGH 50: CPT | Mod: ,,, | Performed by: INTERNAL MEDICINE

## 2021-06-25 PROCEDURE — 20600001 HC STEP DOWN PRIVATE ROOM

## 2021-06-25 PROCEDURE — 84100 ASSAY OF PHOSPHORUS: CPT | Performed by: STUDENT IN AN ORGANIZED HEALTH CARE EDUCATION/TRAINING PROGRAM

## 2021-06-25 PROCEDURE — 25000003 PHARM REV CODE 250: Performed by: INTERNAL MEDICINE

## 2021-06-25 PROCEDURE — 84145 PROCALCITONIN (PCT): CPT | Performed by: STUDENT IN AN ORGANIZED HEALTH CARE EDUCATION/TRAINING PROGRAM

## 2021-06-25 PROCEDURE — 87324 CLOSTRIDIUM AG IA: CPT | Performed by: INTERNAL MEDICINE

## 2021-06-25 PROCEDURE — 99233 PR SUBSEQUENT HOSPITAL CARE,LEVL III: ICD-10-PCS | Mod: ,,, | Performed by: INTERNAL MEDICINE

## 2021-06-25 PROCEDURE — 86140 C-REACTIVE PROTEIN: CPT | Performed by: STUDENT IN AN ORGANIZED HEALTH CARE EDUCATION/TRAINING PROGRAM

## 2021-06-25 PROCEDURE — 25000003 PHARM REV CODE 250: Performed by: STUDENT IN AN ORGANIZED HEALTH CARE EDUCATION/TRAINING PROGRAM

## 2021-06-25 PROCEDURE — 36415 COLL VENOUS BLD VENIPUNCTURE: CPT | Performed by: STUDENT IN AN ORGANIZED HEALTH CARE EDUCATION/TRAINING PROGRAM

## 2021-06-25 PROCEDURE — 87449 NOS EACH ORGANISM AG IA: CPT | Performed by: INTERNAL MEDICINE

## 2021-06-25 PROCEDURE — S4991 NICOTINE PATCH NONLEGEND: HCPCS | Performed by: STUDENT IN AN ORGANIZED HEALTH CARE EDUCATION/TRAINING PROGRAM

## 2021-06-25 PROCEDURE — 63600175 PHARM REV CODE 636 W HCPCS: Performed by: STUDENT IN AN ORGANIZED HEALTH CARE EDUCATION/TRAINING PROGRAM

## 2021-06-25 PROCEDURE — 83735 ASSAY OF MAGNESIUM: CPT | Performed by: STUDENT IN AN ORGANIZED HEALTH CARE EDUCATION/TRAINING PROGRAM

## 2021-06-25 PROCEDURE — 85652 RBC SED RATE AUTOMATED: CPT | Performed by: STUDENT IN AN ORGANIZED HEALTH CARE EDUCATION/TRAINING PROGRAM

## 2021-06-25 PROCEDURE — 80053 COMPREHEN METABOLIC PANEL: CPT | Performed by: STUDENT IN AN ORGANIZED HEALTH CARE EDUCATION/TRAINING PROGRAM

## 2021-06-25 PROCEDURE — 85025 COMPLETE CBC W/AUTO DIFF WBC: CPT | Performed by: STUDENT IN AN ORGANIZED HEALTH CARE EDUCATION/TRAINING PROGRAM

## 2021-06-25 RX ORDER — SODIUM CHLORIDE 9 MG/ML
INJECTION, SOLUTION INTRAVENOUS CONTINUOUS
Status: DISCONTINUED | OUTPATIENT
Start: 2021-06-25 | End: 2021-06-25

## 2021-06-25 RX ORDER — MAGNESIUM SULFATE HEPTAHYDRATE 40 MG/ML
2 INJECTION, SOLUTION INTRAVENOUS ONCE
Status: DISCONTINUED | OUTPATIENT
Start: 2021-06-25 | End: 2021-06-25

## 2021-06-25 RX ORDER — CHOLESTYRAMINE 4 G/9G
1 POWDER, FOR SUSPENSION ORAL 3 TIMES DAILY
Status: DISCONTINUED | OUTPATIENT
Start: 2021-06-25 | End: 2021-06-26 | Stop reason: HOSPADM

## 2021-06-25 RX ORDER — METRONIDAZOLE 500 MG/1
500 TABLET ORAL EVERY 8 HOURS
Status: DISCONTINUED | OUTPATIENT
Start: 2021-06-25 | End: 2021-06-26

## 2021-06-25 RX ORDER — CIPROFLOXACIN 500 MG/1
500 TABLET ORAL EVERY 12 HOURS
Status: DISCONTINUED | OUTPATIENT
Start: 2021-06-25 | End: 2021-06-26

## 2021-06-25 RX ORDER — IBUPROFEN 200 MG
1 TABLET ORAL DAILY
Status: DISCONTINUED | OUTPATIENT
Start: 2021-06-25 | End: 2021-06-26 | Stop reason: HOSPADM

## 2021-06-25 RX ADMIN — AMLODIPINE BESYLATE 10 MG: 10 TABLET ORAL at 08:06

## 2021-06-25 RX ADMIN — SODIUM CHLORIDE 1000 ML: 0.9 INJECTION, SOLUTION INTRAVENOUS at 10:06

## 2021-06-25 RX ADMIN — METRONIDAZOLE 500 MG: 500 TABLET ORAL at 09:06

## 2021-06-25 RX ADMIN — ACETAMINOPHEN 650 MG: 325 TABLET ORAL at 05:06

## 2021-06-25 RX ADMIN — METRONIDAZOLE 500 MG: 500 TABLET ORAL at 01:06

## 2021-06-25 RX ADMIN — AMITRIPTYLINE HYDROCHLORIDE 50 MG: 50 TABLET, FILM COATED ORAL at 09:06

## 2021-06-25 RX ADMIN — CIPROFLOXACIN 500 MG: 500 TABLET, FILM COATED ORAL at 12:06

## 2021-06-25 RX ADMIN — CIPROFLOXACIN 500 MG: 500 TABLET, FILM COATED ORAL at 09:06

## 2021-06-25 RX ADMIN — ACETAMINOPHEN 650 MG: 325 TABLET ORAL at 08:06

## 2021-06-25 RX ADMIN — HEPARIN SODIUM 5000 UNITS: 5000 INJECTION INTRAVENOUS; SUBCUTANEOUS at 09:06

## 2021-06-25 RX ADMIN — CHOLESTYRAMINE 4 G: 4 POWDER, FOR SUSPENSION ORAL at 10:06

## 2021-06-25 RX ADMIN — NICOTINE 1 PATCH: 14 PATCH TRANSDERMAL at 10:06

## 2021-06-25 RX ADMIN — METRONIDAZOLE 500 MG: 500 TABLET ORAL at 05:06

## 2021-06-25 RX ADMIN — CHOLESTYRAMINE 4 G: 4 POWDER, FOR SUSPENSION ORAL at 04:06

## 2021-06-25 RX ADMIN — PAROXETINE HYDROCHLORIDE 10 MG: 10 TABLET, FILM COATED ORAL at 08:06

## 2021-06-25 RX ADMIN — HEPARIN SODIUM 5000 UNITS: 5000 INJECTION INTRAVENOUS; SUBCUTANEOUS at 01:06

## 2021-06-25 RX ADMIN — HEPARIN SODIUM 5000 UNITS: 5000 INJECTION INTRAVENOUS; SUBCUTANEOUS at 05:06

## 2021-06-26 VITALS
DIASTOLIC BLOOD PRESSURE: 72 MMHG | TEMPERATURE: 98 F | OXYGEN SATURATION: 97 % | HEIGHT: 64 IN | HEART RATE: 103 BPM | SYSTOLIC BLOOD PRESSURE: 154 MMHG | WEIGHT: 155.19 LBS | BODY MASS INDEX: 26.49 KG/M2 | RESPIRATION RATE: 18 BRPM

## 2021-06-26 PROBLEM — N17.9 AKI (ACUTE KIDNEY INJURY): Status: RESOLVED | Noted: 2021-06-23 | Resolved: 2021-06-26

## 2021-06-26 LAB
ALBUMIN SERPL BCP-MCNC: 2.8 G/DL (ref 3.5–5.2)
ALP SERPL-CCNC: 127 U/L (ref 55–135)
ALT SERPL W/O P-5'-P-CCNC: 63 U/L (ref 10–44)
ANION GAP SERPL CALC-SCNC: 12 MMOL/L (ref 8–16)
AST SERPL-CCNC: 37 U/L (ref 10–40)
BASOPHILS # BLD AUTO: 0.02 K/UL (ref 0–0.2)
BASOPHILS NFR BLD: 0.3 % (ref 0–1.9)
BILIRUB SERPL-MCNC: 0.4 MG/DL (ref 0.1–1)
BUN SERPL-MCNC: 11 MG/DL (ref 6–20)
C DIFF GDH STL QL: NEGATIVE
C DIFF TOX A+B STL QL IA: NEGATIVE
CALCIUM SERPL-MCNC: 9.9 MG/DL (ref 8.7–10.5)
CHLORIDE SERPL-SCNC: 105 MMOL/L (ref 95–110)
CO2 SERPL-SCNC: 24 MMOL/L (ref 23–29)
CREAT SERPL-MCNC: 0.5 MG/DL (ref 0.5–1.4)
DIFFERENTIAL METHOD: ABNORMAL
EOSINOPHIL # BLD AUTO: 0.1 K/UL (ref 0–0.5)
EOSINOPHIL NFR BLD: 1 % (ref 0–8)
ERYTHROCYTE [DISTWIDTH] IN BLOOD BY AUTOMATED COUNT: 11.7 % (ref 11.5–14.5)
EST. GFR  (AFRICAN AMERICAN): >60 ML/MIN/1.73 M^2
EST. GFR  (NON AFRICAN AMERICAN): >60 ML/MIN/1.73 M^2
GLUCOSE SERPL-MCNC: 86 MG/DL (ref 70–110)
HCT VFR BLD AUTO: 29.2 % (ref 37–48.5)
HGB BLD-MCNC: 10.1 G/DL (ref 12–16)
IMM GRANULOCYTES # BLD AUTO: 0.02 K/UL (ref 0–0.04)
IMM GRANULOCYTES NFR BLD AUTO: 0.3 % (ref 0–0.5)
LYMPHOCYTES # BLD AUTO: 1.5 K/UL (ref 1–4.8)
LYMPHOCYTES NFR BLD: 18.8 % (ref 18–48)
MAGNESIUM SERPL-MCNC: 1.6 MG/DL (ref 1.6–2.6)
MCH RBC QN AUTO: 28 PG (ref 27–31)
MCHC RBC AUTO-ENTMCNC: 34.6 G/DL (ref 32–36)
MCV RBC AUTO: 81 FL (ref 82–98)
MONOCYTES # BLD AUTO: 0.6 K/UL (ref 0.3–1)
MONOCYTES NFR BLD: 7.3 % (ref 4–15)
NEUTROPHILS # BLD AUTO: 5.6 K/UL (ref 1.8–7.7)
NEUTROPHILS NFR BLD: 72.3 % (ref 38–73)
NRBC BLD-RTO: 0 /100 WBC
PHOSPHATE SERPL-MCNC: 2.5 MG/DL (ref 2.7–4.5)
PLATELET # BLD AUTO: 196 K/UL (ref 150–450)
PMV BLD AUTO: 10.9 FL (ref 9.2–12.9)
POTASSIUM SERPL-SCNC: 4.6 MMOL/L (ref 3.5–5.1)
PROT SERPL-MCNC: 6.2 G/DL (ref 6–8.4)
RBC # BLD AUTO: 3.61 M/UL (ref 4–5.4)
SODIUM SERPL-SCNC: 141 MMOL/L (ref 136–145)
WBC # BLD AUTO: 7.7 K/UL (ref 3.9–12.7)

## 2021-06-26 PROCEDURE — 99238 HOSP IP/OBS DSCHRG MGMT 30/<: CPT | Mod: ,,, | Performed by: INTERNAL MEDICINE

## 2021-06-26 PROCEDURE — 25000003 PHARM REV CODE 250: Performed by: STUDENT IN AN ORGANIZED HEALTH CARE EDUCATION/TRAINING PROGRAM

## 2021-06-26 PROCEDURE — 63600175 PHARM REV CODE 636 W HCPCS: Performed by: STUDENT IN AN ORGANIZED HEALTH CARE EDUCATION/TRAINING PROGRAM

## 2021-06-26 PROCEDURE — 84100 ASSAY OF PHOSPHORUS: CPT | Performed by: STUDENT IN AN ORGANIZED HEALTH CARE EDUCATION/TRAINING PROGRAM

## 2021-06-26 PROCEDURE — 85025 COMPLETE CBC W/AUTO DIFF WBC: CPT | Performed by: STUDENT IN AN ORGANIZED HEALTH CARE EDUCATION/TRAINING PROGRAM

## 2021-06-26 PROCEDURE — 83735 ASSAY OF MAGNESIUM: CPT | Performed by: STUDENT IN AN ORGANIZED HEALTH CARE EDUCATION/TRAINING PROGRAM

## 2021-06-26 PROCEDURE — 25000003 PHARM REV CODE 250: Performed by: INTERNAL MEDICINE

## 2021-06-26 PROCEDURE — 99238 PR HOSPITAL DISCHARGE DAY,<30 MIN: ICD-10-PCS | Mod: ,,, | Performed by: INTERNAL MEDICINE

## 2021-06-26 PROCEDURE — 80053 COMPREHEN METABOLIC PANEL: CPT | Performed by: STUDENT IN AN ORGANIZED HEALTH CARE EDUCATION/TRAINING PROGRAM

## 2021-06-26 PROCEDURE — S4991 NICOTINE PATCH NONLEGEND: HCPCS | Performed by: STUDENT IN AN ORGANIZED HEALTH CARE EDUCATION/TRAINING PROGRAM

## 2021-06-26 PROCEDURE — 36415 COLL VENOUS BLD VENIPUNCTURE: CPT | Performed by: STUDENT IN AN ORGANIZED HEALTH CARE EDUCATION/TRAINING PROGRAM

## 2021-06-26 RX ORDER — LISINOPRIL 10 MG/1
10 TABLET ORAL DAILY
Status: DISCONTINUED | OUTPATIENT
Start: 2021-06-26 | End: 2021-06-26 | Stop reason: HOSPADM

## 2021-06-26 RX ORDER — AMOXICILLIN AND CLAVULANATE POTASSIUM 875; 125 MG/1; MG/1
1 TABLET, FILM COATED ORAL EVERY 12 HOURS
Status: DISCONTINUED | OUTPATIENT
Start: 2021-06-26 | End: 2021-06-26 | Stop reason: HOSPADM

## 2021-06-26 RX ORDER — AMOXICILLIN AND CLAVULANATE POTASSIUM 875; 125 MG/1; MG/1
1 TABLET, FILM COATED ORAL EVERY 12 HOURS
Qty: 9 TABLET | Refills: 0 | Status: ON HOLD | OUTPATIENT
Start: 2021-06-26 | End: 2021-07-13 | Stop reason: HOSPADM

## 2021-06-26 RX ORDER — CHOLESTYRAMINE 4 G/9G
1 POWDER, FOR SUSPENSION ORAL 2 TIMES DAILY
Qty: 270 PACKET | Refills: 0 | Status: ON HOLD | OUTPATIENT
Start: 2021-06-26 | End: 2021-08-19 | Stop reason: HOSPADM

## 2021-06-26 RX ORDER — MAGNESIUM SULFATE HEPTAHYDRATE 40 MG/ML
2 INJECTION, SOLUTION INTRAVENOUS ONCE
Status: COMPLETED | OUTPATIENT
Start: 2021-06-26 | End: 2021-06-26

## 2021-06-26 RX ADMIN — SODIUM PHOSPHATE, MONOBASIC, MONOHYDRATE 15 MMOL: 276; 142 INJECTION, SOLUTION INTRAVENOUS at 09:06

## 2021-06-26 RX ADMIN — ONDANSETRON 4 MG: 4 TABLET, ORALLY DISINTEGRATING ORAL at 08:06

## 2021-06-26 RX ADMIN — METRONIDAZOLE 500 MG: 500 TABLET ORAL at 05:06

## 2021-06-26 RX ADMIN — LISINOPRIL 10 MG: 10 TABLET ORAL at 09:06

## 2021-06-26 RX ADMIN — PAROXETINE HYDROCHLORIDE 10 MG: 10 TABLET, FILM COATED ORAL at 08:06

## 2021-06-26 RX ADMIN — AMOXICILLIN AND CLAVULANATE POTASSIUM 1 TABLET: 875; 125 TABLET, FILM COATED ORAL at 09:06

## 2021-06-26 RX ADMIN — NICOTINE 1 PATCH: 14 PATCH TRANSDERMAL at 08:06

## 2021-06-26 RX ADMIN — HEPARIN SODIUM 5000 UNITS: 5000 INJECTION INTRAVENOUS; SUBCUTANEOUS at 05:06

## 2021-06-26 RX ADMIN — MAGNESIUM SULFATE 2 G: 2 INJECTION INTRAVENOUS at 01:06

## 2021-06-26 RX ADMIN — CIPROFLOXACIN 500 MG: 500 TABLET, FILM COATED ORAL at 08:06

## 2021-06-26 RX ADMIN — AMLODIPINE BESYLATE 10 MG: 10 TABLET ORAL at 08:06

## 2021-06-28 LAB
BACTERIA BLD CULT: NORMAL
BACTERIA BLD CULT: NORMAL
BACTERIA STL CULT: NORMAL

## 2021-06-30 ENCOUNTER — TELEPHONE (OUTPATIENT)
Dept: INTERNAL MEDICINE | Facility: CLINIC | Age: 61
End: 2021-06-30

## 2021-07-01 ENCOUNTER — TELEPHONE (OUTPATIENT)
Dept: INTERNAL MEDICINE | Facility: CLINIC | Age: 61
End: 2021-07-01

## 2021-07-11 ENCOUNTER — HOSPITAL ENCOUNTER (INPATIENT)
Facility: HOSPITAL | Age: 61
LOS: 2 days | Discharge: HOME | DRG: 371 | End: 2021-07-13
Attending: EMERGENCY MEDICINE | Admitting: INTERNAL MEDICINE
Payer: MEDICAID

## 2021-07-11 DIAGNOSIS — R19.7 DIARRHEA, UNSPECIFIED TYPE: Primary | ICD-10-CM

## 2021-07-11 DIAGNOSIS — R41.82 ALTERED MENTAL STATUS, UNSPECIFIED ALTERED MENTAL STATUS TYPE: ICD-10-CM

## 2021-07-11 DIAGNOSIS — A04.72 CLOSTRIDIUM DIFFICILE DIARRHEA: ICD-10-CM

## 2021-07-11 DIAGNOSIS — R79.89 ELEVATED TROPONIN: ICD-10-CM

## 2021-07-11 DIAGNOSIS — R00.0 TACHYCARDIA: ICD-10-CM

## 2021-07-11 DIAGNOSIS — R11.2 NON-INTRACTABLE VOMITING WITH NAUSEA, UNSPECIFIED VOMITING TYPE: ICD-10-CM

## 2021-07-11 DIAGNOSIS — E86.0 DEHYDRATION: ICD-10-CM

## 2021-07-11 PROBLEM — G93.40 ACUTE ENCEPHALOPATHY: Status: ACTIVE | Noted: 2021-07-11

## 2021-07-11 PROBLEM — E83.42 HYPOMAGNESEMIA: Status: ACTIVE | Noted: 2021-07-11

## 2021-07-11 LAB
ALBUMIN SERPL BCP-MCNC: 2.8 G/DL (ref 3.5–5.2)
ALP SERPL-CCNC: 156 U/L (ref 55–135)
ALT SERPL W/O P-5'-P-CCNC: 40 U/L (ref 10–44)
AMPHET+METHAMPHET UR QL: NEGATIVE
ANION GAP SERPL CALC-SCNC: 14 MMOL/L (ref 8–16)
AST SERPL-CCNC: 51 U/L (ref 10–40)
BACTERIA #/AREA URNS AUTO: NORMAL /HPF
BARBITURATES UR QL SCN>200 NG/ML: NEGATIVE
BASOPHILS # BLD AUTO: 0.03 K/UL (ref 0–0.2)
BASOPHILS NFR BLD: 0.4 % (ref 0–1.9)
BENZODIAZ UR QL SCN>200 NG/ML: ABNORMAL
BILIRUB SERPL-MCNC: 0.7 MG/DL (ref 0.1–1)
BILIRUB UR QL STRIP: NEGATIVE
BUN SERPL-MCNC: 11 MG/DL (ref 6–20)
BZE UR QL SCN: NEGATIVE
CALCIUM SERPL-MCNC: 10.1 MG/DL (ref 8.7–10.5)
CANNABINOIDS UR QL SCN: NEGATIVE
CHLORIDE SERPL-SCNC: 96 MMOL/L (ref 95–110)
CLARITY UR REFRACT.AUTO: ABNORMAL
CO2 SERPL-SCNC: 24 MMOL/L (ref 23–29)
COLOR UR AUTO: YELLOW
CREAT SERPL-MCNC: 0.6 MG/DL (ref 0.5–1.4)
CREAT UR-MCNC: 76 MG/DL (ref 15–325)
CTP QC/QA: YES
DIFFERENTIAL METHOD: ABNORMAL
EOSINOPHIL # BLD AUTO: 0.1 K/UL (ref 0–0.5)
EOSINOPHIL NFR BLD: 0.7 % (ref 0–8)
ERYTHROCYTE [DISTWIDTH] IN BLOOD BY AUTOMATED COUNT: 12 % (ref 11.5–14.5)
EST. GFR  (AFRICAN AMERICAN): >60 ML/MIN/1.73 M^2
EST. GFR  (NON AFRICAN AMERICAN): >60 ML/MIN/1.73 M^2
ETHANOL SERPL-MCNC: <10 MG/DL
GLUCOSE SERPL-MCNC: 103 MG/DL (ref 70–110)
GLUCOSE UR QL STRIP: NEGATIVE
HCT VFR BLD AUTO: 28.9 % (ref 37–48.5)
HGB BLD-MCNC: 9.8 G/DL (ref 12–16)
HGB UR QL STRIP: NEGATIVE
HYALINE CASTS UR QL AUTO: 0 /LPF
IMM GRANULOCYTES # BLD AUTO: 0.03 K/UL (ref 0–0.04)
IMM GRANULOCYTES NFR BLD AUTO: 0.4 % (ref 0–0.5)
IRON SERPL-MCNC: 25 UG/DL (ref 30–160)
KETONES UR QL STRIP: ABNORMAL
LACTATE SERPL-SCNC: 1.8 MMOL/L (ref 0.5–2.2)
LEUKOCYTE ESTERASE UR QL STRIP: ABNORMAL
LIPASE SERPL-CCNC: 74 U/L (ref 4–60)
LIPASE SERPL-CCNC: 80 U/L (ref 4–60)
LYMPHOCYTES # BLD AUTO: 1.2 K/UL (ref 1–4.8)
LYMPHOCYTES NFR BLD: 16.4 % (ref 18–48)
MAGNESIUM SERPL-MCNC: 1.2 MG/DL (ref 1.6–2.6)
MCH RBC QN AUTO: 26.9 PG (ref 27–31)
MCHC RBC AUTO-ENTMCNC: 33.9 G/DL (ref 32–36)
MCV RBC AUTO: 79 FL (ref 82–98)
METHADONE UR QL SCN>300 NG/ML: NEGATIVE
MICROSCOPIC COMMENT: NORMAL
MONOCYTES # BLD AUTO: 0.8 K/UL (ref 0.3–1)
MONOCYTES NFR BLD: 10.2 % (ref 4–15)
NEUTROPHILS # BLD AUTO: 5.3 K/UL (ref 1.8–7.7)
NEUTROPHILS NFR BLD: 71.9 % (ref 38–73)
NITRITE UR QL STRIP: NEGATIVE
NRBC BLD-RTO: 0 /100 WBC
OPIATES UR QL SCN: NEGATIVE
PCP UR QL SCN>25 NG/ML: NEGATIVE
PH UR STRIP: 8 [PH] (ref 5–8)
PLATELET # BLD AUTO: 245 K/UL (ref 150–450)
PMV BLD AUTO: 10.9 FL (ref 9.2–12.9)
POTASSIUM SERPL-SCNC: 4.1 MMOL/L (ref 3.5–5.1)
PROT SERPL-MCNC: 6.5 G/DL (ref 6–8.4)
PROT UR QL STRIP: ABNORMAL
RBC # BLD AUTO: 3.64 M/UL (ref 4–5.4)
RBC #/AREA URNS AUTO: 2 /HPF (ref 0–4)
SARS-COV-2 RDRP RESP QL NAA+PROBE: NEGATIVE
SATURATED IRON: 13 % (ref 20–50)
SODIUM SERPL-SCNC: 134 MMOL/L (ref 136–145)
SP GR UR STRIP: 1.01 (ref 1–1.03)
SQUAMOUS #/AREA URNS AUTO: 6 /HPF
TOTAL IRON BINDING CAPACITY: 194 UG/DL (ref 250–450)
TOXICOLOGY INFORMATION: ABNORMAL
TRANSFERRIN SERPL-MCNC: 131 MG/DL (ref 200–375)
TROPONIN I SERPL DL<=0.01 NG/ML-MCNC: 0.03 NG/ML (ref 0–0.03)
TROPONIN I SERPL DL<=0.01 NG/ML-MCNC: 0.07 NG/ML (ref 0–0.03)
URN SPEC COLLECT METH UR: ABNORMAL
WBC # BLD AUTO: 7.33 K/UL (ref 3.9–12.7)
WBC #/AREA URNS AUTO: 5 /HPF (ref 0–5)

## 2021-07-11 PROCEDURE — 83735 ASSAY OF MAGNESIUM: CPT | Performed by: PHYSICIAN ASSISTANT

## 2021-07-11 PROCEDURE — 82077 ASSAY SPEC XCP UR&BREATH IA: CPT | Performed by: PHYSICIAN ASSISTANT

## 2021-07-11 PROCEDURE — G0378 HOSPITAL OBSERVATION PER HR: HCPCS

## 2021-07-11 PROCEDURE — 96376 TX/PRO/DX INJ SAME DRUG ADON: CPT

## 2021-07-11 PROCEDURE — 83690 ASSAY OF LIPASE: CPT | Mod: 91 | Performed by: PHYSICIAN ASSISTANT

## 2021-07-11 PROCEDURE — 96365 THER/PROPH/DIAG IV INF INIT: CPT

## 2021-07-11 PROCEDURE — 84484 ASSAY OF TROPONIN QUANT: CPT | Performed by: PHYSICIAN ASSISTANT

## 2021-07-11 PROCEDURE — 85025 COMPLETE CBC W/AUTO DIFF WBC: CPT | Performed by: PHYSICIAN ASSISTANT

## 2021-07-11 PROCEDURE — 83605 ASSAY OF LACTIC ACID: CPT | Performed by: PHYSICIAN ASSISTANT

## 2021-07-11 PROCEDURE — 93010 ELECTROCARDIOGRAM REPORT: CPT | Mod: 59,76,, | Performed by: INTERNAL MEDICINE

## 2021-07-11 PROCEDURE — 80053 COMPREHEN METABOLIC PANEL: CPT | Performed by: PHYSICIAN ASSISTANT

## 2021-07-11 PROCEDURE — 93010 ELECTROCARDIOGRAM REPORT: CPT | Mod: ,,, | Performed by: INTERNAL MEDICINE

## 2021-07-11 PROCEDURE — 93005 ELECTROCARDIOGRAM TRACING: CPT

## 2021-07-11 PROCEDURE — 99285 PR EMERGENCY DEPT VISIT,LEVEL V: ICD-10-PCS | Mod: CS,,, | Performed by: PHYSICIAN ASSISTANT

## 2021-07-11 PROCEDURE — 96375 TX/PRO/DX INJ NEW DRUG ADDON: CPT

## 2021-07-11 PROCEDURE — 96361 HYDRATE IV INFUSION ADD-ON: CPT

## 2021-07-11 PROCEDURE — 83690 ASSAY OF LIPASE: CPT | Performed by: PHYSICIAN ASSISTANT

## 2021-07-11 PROCEDURE — U0002 COVID-19 LAB TEST NON-CDC: HCPCS | Performed by: EMERGENCY MEDICINE

## 2021-07-11 PROCEDURE — 84439 ASSAY OF FREE THYROXINE: CPT | Performed by: PHYSICIAN ASSISTANT

## 2021-07-11 PROCEDURE — 63600175 PHARM REV CODE 636 W HCPCS: Performed by: PHYSICIAN ASSISTANT

## 2021-07-11 PROCEDURE — 81001 URINALYSIS AUTO W/SCOPE: CPT | Performed by: PHYSICIAN ASSISTANT

## 2021-07-11 PROCEDURE — 12000002 HC ACUTE/MED SURGE SEMI-PRIVATE ROOM

## 2021-07-11 PROCEDURE — 99285 EMERGENCY DEPT VISIT HI MDM: CPT | Mod: 25

## 2021-07-11 PROCEDURE — 84443 ASSAY THYROID STIM HORMONE: CPT | Performed by: PHYSICIAN ASSISTANT

## 2021-07-11 PROCEDURE — 99285 EMERGENCY DEPT VISIT HI MDM: CPT | Mod: CS,,, | Performed by: PHYSICIAN ASSISTANT

## 2021-07-11 PROCEDURE — 80307 DRUG TEST PRSMV CHEM ANLYZR: CPT | Performed by: PHYSICIAN ASSISTANT

## 2021-07-11 PROCEDURE — 25000003 PHARM REV CODE 250: Performed by: PHYSICIAN ASSISTANT

## 2021-07-11 PROCEDURE — 25500020 PHARM REV CODE 255: Performed by: EMERGENCY MEDICINE

## 2021-07-11 PROCEDURE — 82728 ASSAY OF FERRITIN: CPT | Performed by: PHYSICIAN ASSISTANT

## 2021-07-11 PROCEDURE — 93010 EKG 12-LEAD: ICD-10-PCS | Mod: ,,, | Performed by: INTERNAL MEDICINE

## 2021-07-11 PROCEDURE — 84484 ASSAY OF TROPONIN QUANT: CPT | Mod: 91 | Performed by: PHYSICIAN ASSISTANT

## 2021-07-11 PROCEDURE — 83540 ASSAY OF IRON: CPT | Performed by: PHYSICIAN ASSISTANT

## 2021-07-11 RX ORDER — IBUPROFEN 200 MG
16 TABLET ORAL
Status: DISCONTINUED | OUTPATIENT
Start: 2021-07-12 | End: 2021-07-13 | Stop reason: HOSPADM

## 2021-07-11 RX ORDER — LORAZEPAM 2 MG/ML
1 INJECTION INTRAMUSCULAR
Status: COMPLETED | OUTPATIENT
Start: 2021-07-11 | End: 2021-07-11

## 2021-07-11 RX ORDER — CHOLESTYRAMINE 4 G/9G
1 POWDER, FOR SUSPENSION ORAL 2 TIMES DAILY
Status: DISCONTINUED | OUTPATIENT
Start: 2021-07-12 | End: 2021-07-13 | Stop reason: HOSPADM

## 2021-07-11 RX ORDER — ATORVASTATIN CALCIUM 20 MG/1
40 TABLET, FILM COATED ORAL DAILY
Status: DISCONTINUED | OUTPATIENT
Start: 2021-07-12 | End: 2021-07-13 | Stop reason: HOSPADM

## 2021-07-11 RX ORDER — HYDROCHLOROTHIAZIDE 25 MG/1
50 TABLET ORAL DAILY
Status: DISCONTINUED | OUTPATIENT
Start: 2021-07-12 | End: 2021-07-13 | Stop reason: HOSPADM

## 2021-07-11 RX ORDER — GLUCAGON 1 MG
1 KIT INJECTION
Status: DISCONTINUED | OUTPATIENT
Start: 2021-07-12 | End: 2021-07-13 | Stop reason: HOSPADM

## 2021-07-11 RX ORDER — BISACODYL 10 MG
10 SUPPOSITORY, RECTAL RECTAL DAILY PRN
Status: DISCONTINUED | OUTPATIENT
Start: 2021-07-12 | End: 2021-07-13 | Stop reason: HOSPADM

## 2021-07-11 RX ORDER — SODIUM CHLORIDE 0.9 % (FLUSH) 0.9 %
10 SYRINGE (ML) INJECTION
Status: CANCELLED | OUTPATIENT
Start: 2021-07-11

## 2021-07-11 RX ORDER — IBUPROFEN 200 MG
24 TABLET ORAL
Status: DISCONTINUED | OUTPATIENT
Start: 2021-07-12 | End: 2021-07-13 | Stop reason: HOSPADM

## 2021-07-11 RX ORDER — FAMOTIDINE 10 MG/ML
20 INJECTION INTRAVENOUS
Status: COMPLETED | OUTPATIENT
Start: 2021-07-11 | End: 2021-07-11

## 2021-07-11 RX ORDER — TALC
9 POWDER (GRAM) TOPICAL NIGHTLY PRN
Status: DISCONTINUED | OUTPATIENT
Start: 2021-07-12 | End: 2021-07-13 | Stop reason: HOSPADM

## 2021-07-11 RX ORDER — SODIUM CHLORIDE, SODIUM LACTATE, POTASSIUM CHLORIDE, CALCIUM CHLORIDE 600; 310; 30; 20 MG/100ML; MG/100ML; MG/100ML; MG/100ML
INJECTION, SOLUTION INTRAVENOUS CONTINUOUS
Status: ACTIVE | OUTPATIENT
Start: 2021-07-12 | End: 2021-07-12

## 2021-07-11 RX ORDER — ACETAMINOPHEN 325 MG/1
650 TABLET ORAL EVERY 4 HOURS PRN
Status: DISCONTINUED | OUTPATIENT
Start: 2021-07-12 | End: 2021-07-13 | Stop reason: HOSPADM

## 2021-07-11 RX ORDER — IPRATROPIUM BROMIDE AND ALBUTEROL SULFATE 2.5; .5 MG/3ML; MG/3ML
3 SOLUTION RESPIRATORY (INHALATION) EVERY 4 HOURS PRN
Status: DISCONTINUED | OUTPATIENT
Start: 2021-07-12 | End: 2021-07-13 | Stop reason: HOSPADM

## 2021-07-11 RX ORDER — AMLODIPINE BESYLATE 10 MG/1
10 TABLET ORAL DAILY
Status: DISCONTINUED | OUTPATIENT
Start: 2021-07-12 | End: 2021-07-13 | Stop reason: HOSPADM

## 2021-07-11 RX ORDER — PAROXETINE 10 MG/1
40 TABLET, FILM COATED ORAL DAILY
Status: DISCONTINUED | OUTPATIENT
Start: 2021-07-12 | End: 2021-07-13 | Stop reason: HOSPADM

## 2021-07-11 RX ORDER — AMITRIPTYLINE HYDROCHLORIDE 50 MG/1
150 TABLET, FILM COATED ORAL NIGHTLY
Status: DISCONTINUED | OUTPATIENT
Start: 2021-07-12 | End: 2021-07-13 | Stop reason: HOSPADM

## 2021-07-11 RX ORDER — TALC
6 POWDER (GRAM) TOPICAL NIGHTLY PRN
Status: CANCELLED | OUTPATIENT
Start: 2021-07-11

## 2021-07-11 RX ORDER — LISINOPRIL 10 MG/1
10 TABLET ORAL DAILY
Status: DISCONTINUED | OUTPATIENT
Start: 2021-07-12 | End: 2021-07-13 | Stop reason: HOSPADM

## 2021-07-11 RX ORDER — ONDANSETRON 2 MG/ML
8 INJECTION INTRAMUSCULAR; INTRAVENOUS
Status: COMPLETED | OUTPATIENT
Start: 2021-07-11 | End: 2021-07-11

## 2021-07-11 RX ORDER — ONDANSETRON 8 MG/1
8 TABLET, ORALLY DISINTEGRATING ORAL EVERY 8 HOURS PRN
Status: DISCONTINUED | OUTPATIENT
Start: 2021-07-12 | End: 2021-07-13 | Stop reason: HOSPADM

## 2021-07-11 RX ORDER — MAGNESIUM SULFATE 1 G/100ML
1 INJECTION INTRAVENOUS
Status: COMPLETED | OUTPATIENT
Start: 2021-07-11 | End: 2021-07-11

## 2021-07-11 RX ADMIN — MAGNESIUM SULFATE HEPTAHYDRATE 1 G: 500 INJECTION, SOLUTION INTRAMUSCULAR; INTRAVENOUS at 08:07

## 2021-07-11 RX ADMIN — IOHEXOL 75 ML: 350 INJECTION, SOLUTION INTRAVENOUS at 08:07

## 2021-07-11 RX ADMIN — LORAZEPAM 1 MG: 2 INJECTION INTRAMUSCULAR; INTRAVENOUS at 07:07

## 2021-07-11 RX ADMIN — SODIUM CHLORIDE, SODIUM LACTATE, POTASSIUM CHLORIDE, AND CALCIUM CHLORIDE 1000 ML: .6; .31; .03; .02 INJECTION, SOLUTION INTRAVENOUS at 05:07

## 2021-07-11 RX ADMIN — ONDANSETRON 8 MG: 2 INJECTION INTRAMUSCULAR; INTRAVENOUS at 09:07

## 2021-07-11 RX ADMIN — LORAZEPAM 1 MG: 2 INJECTION INTRAMUSCULAR; INTRAVENOUS at 05:07

## 2021-07-11 RX ADMIN — FAMOTIDINE 20 MG: 10 INJECTION INTRAVENOUS at 07:07

## 2021-07-12 PROBLEM — R91.1 PULMONARY NODULE, RIGHT: Status: ACTIVE | Noted: 2021-07-12

## 2021-07-12 PROBLEM — E55.9 VITAMIN D DEFICIENCY: Status: ACTIVE | Noted: 2021-07-12

## 2021-07-12 PROBLEM — F17.200 TOBACCO USE DISORDER: Chronic | Status: ACTIVE | Noted: 2021-06-24

## 2021-07-12 PROBLEM — R65.20 SEVERE SEPSIS: Status: RESOLVED | Noted: 2021-06-25 | Resolved: 2021-07-12

## 2021-07-12 PROBLEM — I10 ESSENTIAL HYPERTENSION: Chronic | Status: ACTIVE | Noted: 2021-06-09

## 2021-07-12 PROBLEM — Z87.891 FORMER SMOKER: Status: ACTIVE | Noted: 2021-06-24

## 2021-07-12 PROBLEM — F41.9 ANXIETY DISORDER: Chronic | Status: ACTIVE | Noted: 2021-06-24

## 2021-07-12 PROBLEM — K52.9 ACUTE COLITIS: Status: RESOLVED | Noted: 2021-06-23 | Resolved: 2021-07-12

## 2021-07-12 PROBLEM — R55 POSTURAL DIZZINESS WITH PRESYNCOPE: Status: RESOLVED | Noted: 2021-06-23 | Resolved: 2021-07-12

## 2021-07-12 PROBLEM — G93.40 ACUTE ENCEPHALOPATHY: Status: RESOLVED | Noted: 2021-07-11 | Resolved: 2021-07-12

## 2021-07-12 PROBLEM — R19.7 NAUSEA VOMITING AND DIARRHEA: Status: RESOLVED | Noted: 2021-07-11 | Resolved: 2021-07-12

## 2021-07-12 PROBLEM — A41.9 SEVERE SEPSIS: Status: RESOLVED | Noted: 2021-06-25 | Resolved: 2021-07-12

## 2021-07-12 PROBLEM — R11.2 NAUSEA VOMITING AND DIARRHEA: Status: RESOLVED | Noted: 2021-07-11 | Resolved: 2021-07-12

## 2021-07-12 PROBLEM — Z90.49 S/P LAPAROSCOPIC CHOLECYSTECTOMY: Chronic | Status: ACTIVE | Noted: 2021-06-24

## 2021-07-12 PROBLEM — R42 POSTURAL DIZZINESS WITH PRESYNCOPE: Status: RESOLVED | Noted: 2021-06-23 | Resolved: 2021-07-12

## 2021-07-12 LAB
25(OH)D3+25(OH)D2 SERPL-MCNC: 19 NG/ML (ref 30–96)
ALBUMIN SERPL BCP-MCNC: 2.6 G/DL (ref 3.5–5.2)
ALP SERPL-CCNC: 143 U/L (ref 55–135)
ALT SERPL W/O P-5'-P-CCNC: 40 U/L (ref 10–44)
ANION GAP SERPL CALC-SCNC: 14 MMOL/L (ref 8–16)
ANISOCYTOSIS BLD QL SMEAR: SLIGHT
AST SERPL-CCNC: 40 U/L (ref 10–40)
BASO STIPL BLD QL SMEAR: ABNORMAL
BASOPHILS # BLD AUTO: 0.02 K/UL (ref 0–0.2)
BASOPHILS NFR BLD: 0.3 % (ref 0–1.9)
BILIRUB SERPL-MCNC: 0.5 MG/DL (ref 0.1–1)
BUN SERPL-MCNC: 9 MG/DL (ref 6–20)
C DIFF GDH STL QL: POSITIVE
C DIFF TOX A+B STL QL IA: POSITIVE
CALCIUM SERPL-MCNC: 9.3 MG/DL (ref 8.7–10.5)
CHLORIDE SERPL-SCNC: 98 MMOL/L (ref 95–110)
CO2 SERPL-SCNC: 25 MMOL/L (ref 23–29)
CREAT SERPL-MCNC: 0.5 MG/DL (ref 0.5–1.4)
DIFFERENTIAL METHOD: ABNORMAL
EOSINOPHIL # BLD AUTO: 0.1 K/UL (ref 0–0.5)
EOSINOPHIL NFR BLD: 0.9 % (ref 0–8)
ERYTHROCYTE [DISTWIDTH] IN BLOOD BY AUTOMATED COUNT: 12 % (ref 11.5–14.5)
EST. GFR  (AFRICAN AMERICAN): >60 ML/MIN/1.73 M^2
EST. GFR  (NON AFRICAN AMERICAN): >60 ML/MIN/1.73 M^2
FERRITIN SERPL-MCNC: 596 NG/ML (ref 20–300)
FOLATE SERPL-MCNC: 7.8 NG/ML (ref 4–24)
GLUCOSE SERPL-MCNC: 90 MG/DL (ref 70–110)
HCT VFR BLD AUTO: 27.3 % (ref 37–48.5)
HGB BLD-MCNC: 9.3 G/DL (ref 12–16)
IMM GRANULOCYTES # BLD AUTO: 0.02 K/UL (ref 0–0.04)
IMM GRANULOCYTES NFR BLD AUTO: 0.3 % (ref 0–0.5)
LYMPHOCYTES # BLD AUTO: 1.6 K/UL (ref 1–4.8)
LYMPHOCYTES NFR BLD: 24.5 % (ref 18–48)
MAGNESIUM SERPL-MCNC: 1.5 MG/DL (ref 1.6–2.6)
MCH RBC QN AUTO: 27.5 PG (ref 27–31)
MCHC RBC AUTO-ENTMCNC: 34.1 G/DL (ref 32–36)
MCV RBC AUTO: 81 FL (ref 82–98)
MONOCYTES # BLD AUTO: 0.6 K/UL (ref 0.3–1)
MONOCYTES NFR BLD: 9.2 % (ref 4–15)
NEUTROPHILS # BLD AUTO: 4.1 K/UL (ref 1.8–7.7)
NEUTROPHILS NFR BLD: 64.8 % (ref 38–73)
NRBC BLD-RTO: 0 /100 WBC
PLATELET # BLD AUTO: 266 K/UL (ref 150–450)
PLATELET BLD QL SMEAR: ABNORMAL
PMV BLD AUTO: 11 FL (ref 9.2–12.9)
POLYCHROMASIA BLD QL SMEAR: ABNORMAL
POTASSIUM SERPL-SCNC: 2.9 MMOL/L (ref 3.5–5.1)
PROT SERPL-MCNC: 5.8 G/DL (ref 6–8.4)
RBC # BLD AUTO: 3.38 M/UL (ref 4–5.4)
SODIUM SERPL-SCNC: 137 MMOL/L (ref 136–145)
T4 FREE SERPL-MCNC: 3.71 NG/DL (ref 0.71–1.51)
TROPONIN I SERPL DL<=0.01 NG/ML-MCNC: 0.09 NG/ML (ref 0–0.03)
TSH SERPL DL<=0.005 MIU/L-ACNC: <0.01 UIU/ML (ref 0.4–4)
VIT B12 SERPL-MCNC: 804 PG/ML (ref 210–950)
WBC # BLD AUTO: 6.38 K/UL (ref 3.9–12.7)

## 2021-07-12 PROCEDURE — 84484 ASSAY OF TROPONIN QUANT: CPT | Performed by: PHYSICIAN ASSISTANT

## 2021-07-12 PROCEDURE — 83735 ASSAY OF MAGNESIUM: CPT | Performed by: PHYSICIAN ASSISTANT

## 2021-07-12 PROCEDURE — 85025 COMPLETE CBC W/AUTO DIFF WBC: CPT | Performed by: PHYSICIAN ASSISTANT

## 2021-07-12 PROCEDURE — 87449 NOS EACH ORGANISM AG IA: CPT | Performed by: PHYSICIAN ASSISTANT

## 2021-07-12 PROCEDURE — 63600175 PHARM REV CODE 636 W HCPCS: Performed by: HOSPITALIST

## 2021-07-12 PROCEDURE — 36415 COLL VENOUS BLD VENIPUNCTURE: CPT | Performed by: PHYSICIAN ASSISTANT

## 2021-07-12 PROCEDURE — 25000003 PHARM REV CODE 250: Performed by: HOSPITALIST

## 2021-07-12 PROCEDURE — 25500020 PHARM REV CODE 255: Performed by: HOSPITALIST

## 2021-07-12 PROCEDURE — 87046 STOOL CULTR AEROBIC BACT EA: CPT | Mod: 59 | Performed by: PHYSICIAN ASSISTANT

## 2021-07-12 PROCEDURE — 63600175 PHARM REV CODE 636 W HCPCS: Performed by: PHYSICIAN ASSISTANT

## 2021-07-12 PROCEDURE — 25000003 PHARM REV CODE 250: Performed by: PHYSICIAN ASSISTANT

## 2021-07-12 PROCEDURE — 82306 VITAMIN D 25 HYDROXY: CPT | Performed by: PHYSICIAN ASSISTANT

## 2021-07-12 PROCEDURE — 99223 1ST HOSP IP/OBS HIGH 75: CPT | Mod: ,,, | Performed by: HOSPITALIST

## 2021-07-12 PROCEDURE — 87427 SHIGA-LIKE TOXIN AG IA: CPT | Performed by: PHYSICIAN ASSISTANT

## 2021-07-12 PROCEDURE — 89055 LEUKOCYTE ASSESSMENT FECAL: CPT | Performed by: PHYSICIAN ASSISTANT

## 2021-07-12 PROCEDURE — 11000001 HC ACUTE MED/SURG PRIVATE ROOM

## 2021-07-12 PROCEDURE — 87324 CLOSTRIDIUM AG IA: CPT | Performed by: PHYSICIAN ASSISTANT

## 2021-07-12 PROCEDURE — 82746 ASSAY OF FOLIC ACID SERUM: CPT | Performed by: PHYSICIAN ASSISTANT

## 2021-07-12 PROCEDURE — 99223 PR INITIAL HOSPITAL CARE,LEVL III: ICD-10-PCS | Mod: ,,, | Performed by: HOSPITALIST

## 2021-07-12 PROCEDURE — 87045 FECES CULTURE AEROBIC BACT: CPT | Performed by: PHYSICIAN ASSISTANT

## 2021-07-12 PROCEDURE — 82607 VITAMIN B-12: CPT | Performed by: PHYSICIAN ASSISTANT

## 2021-07-12 PROCEDURE — 80053 COMPREHEN METABOLIC PANEL: CPT | Performed by: PHYSICIAN ASSISTANT

## 2021-07-12 RX ORDER — POTASSIUM CHLORIDE 20 MEQ/1
40 TABLET, EXTENDED RELEASE ORAL
Status: DISCONTINUED | OUTPATIENT
Start: 2021-07-12 | End: 2021-07-12

## 2021-07-12 RX ORDER — POTASSIUM CHLORIDE 20 MEQ/1
40 TABLET, EXTENDED RELEASE ORAL
Status: COMPLETED | OUTPATIENT
Start: 2021-07-12 | End: 2021-07-12

## 2021-07-12 RX ORDER — MAGNESIUM SULFATE HEPTAHYDRATE 40 MG/ML
2 INJECTION, SOLUTION INTRAVENOUS ONCE
Status: COMPLETED | OUTPATIENT
Start: 2021-07-12 | End: 2021-07-12

## 2021-07-12 RX ADMIN — IOHEXOL 75 ML: 350 INJECTION, SOLUTION INTRAVENOUS at 12:07

## 2021-07-12 RX ADMIN — ATORVASTATIN CALCIUM 40 MG: 20 TABLET, FILM COATED ORAL at 09:07

## 2021-07-12 RX ADMIN — LISINOPRIL 10 MG: 10 TABLET ORAL at 09:07

## 2021-07-12 RX ADMIN — CHOLESTYRAMINE 4 G: 4 POWDER, FOR SUSPENSION ORAL at 09:07

## 2021-07-12 RX ADMIN — MAGNESIUM SULFATE 2 G: 2 INJECTION INTRAVENOUS at 01:07

## 2021-07-12 RX ADMIN — HYDROCHLOROTHIAZIDE 50 MG: 25 TABLET ORAL at 09:07

## 2021-07-12 RX ADMIN — ACETAMINOPHEN 650 MG: 325 TABLET ORAL at 09:07

## 2021-07-12 RX ADMIN — AMITRIPTYLINE HYDROCHLORIDE 150 MG: 50 TABLET, FILM COATED ORAL at 09:07

## 2021-07-12 RX ADMIN — MELATONIN TAB 3 MG 9 MG: 3 TAB at 09:07

## 2021-07-12 RX ADMIN — ONDANSETRON 8 MG: 8 TABLET, ORALLY DISINTEGRATING ORAL at 09:07

## 2021-07-12 RX ADMIN — POTASSIUM CHLORIDE 40 MEQ: 1500 TABLET, EXTENDED RELEASE ORAL at 12:07

## 2021-07-12 RX ADMIN — POTASSIUM CHLORIDE 40 MEQ: 1500 TABLET, EXTENDED RELEASE ORAL at 09:07

## 2021-07-12 RX ADMIN — SODIUM CHLORIDE, SODIUM LACTATE, POTASSIUM CHLORIDE, AND CALCIUM CHLORIDE: .6; .31; .03; .02 INJECTION, SOLUTION INTRAVENOUS at 01:07

## 2021-07-12 RX ADMIN — PAROXETINE HYDROCHLORIDE 40 MG: 10 TABLET, FILM COATED ORAL at 09:07

## 2021-07-12 RX ADMIN — AMLODIPINE BESYLATE 10 MG: 10 TABLET ORAL at 09:07

## 2021-07-13 VITALS
RESPIRATION RATE: 15 BRPM | WEIGHT: 150 LBS | HEART RATE: 105 BPM | BODY MASS INDEX: 25.75 KG/M2 | OXYGEN SATURATION: 94 % | SYSTOLIC BLOOD PRESSURE: 117 MMHG | DIASTOLIC BLOOD PRESSURE: 90 MMHG | TEMPERATURE: 98 F

## 2021-07-13 PROBLEM — A04.72 CLOSTRIDIUM DIFFICILE DIARRHEA: Status: ACTIVE | Noted: 2021-06-23

## 2021-07-13 LAB
ALBUMIN SERPL BCP-MCNC: 2.8 G/DL (ref 3.5–5.2)
ALP SERPL-CCNC: 145 U/L (ref 55–135)
ALT SERPL W/O P-5'-P-CCNC: 34 U/L (ref 10–44)
ANION GAP SERPL CALC-SCNC: 13 MMOL/L (ref 8–16)
AST SERPL-CCNC: 31 U/L (ref 10–40)
BASOPHILS # BLD AUTO: 0.03 K/UL (ref 0–0.2)
BASOPHILS NFR BLD: 0.4 % (ref 0–1.9)
BILIRUB SERPL-MCNC: 0.5 MG/DL (ref 0.1–1)
BUN SERPL-MCNC: 6 MG/DL (ref 6–20)
CALCIUM SERPL-MCNC: 9.5 MG/DL (ref 8.7–10.5)
CHLORIDE SERPL-SCNC: 103 MMOL/L (ref 95–110)
CO2 SERPL-SCNC: 23 MMOL/L (ref 23–29)
CREAT SERPL-MCNC: 0.5 MG/DL (ref 0.5–1.4)
DIFFERENTIAL METHOD: ABNORMAL
EOSINOPHIL # BLD AUTO: 0.1 K/UL (ref 0–0.5)
EOSINOPHIL NFR BLD: 2 % (ref 0–8)
ERYTHROCYTE [DISTWIDTH] IN BLOOD BY AUTOMATED COUNT: 12.4 % (ref 11.5–14.5)
EST. GFR  (AFRICAN AMERICAN): >60 ML/MIN/1.73 M^2
EST. GFR  (NON AFRICAN AMERICAN): >60 ML/MIN/1.73 M^2
GLUCOSE SERPL-MCNC: 83 MG/DL (ref 70–110)
HCT VFR BLD AUTO: 27.6 % (ref 37–48.5)
HGB BLD-MCNC: 9.2 G/DL (ref 12–16)
IMM GRANULOCYTES # BLD AUTO: 0.01 K/UL (ref 0–0.04)
IMM GRANULOCYTES NFR BLD AUTO: 0.1 % (ref 0–0.5)
LYMPHOCYTES # BLD AUTO: 1.8 K/UL (ref 1–4.8)
LYMPHOCYTES NFR BLD: 25.7 % (ref 18–48)
MAGNESIUM SERPL-MCNC: 1.5 MG/DL (ref 1.6–2.6)
MCH RBC QN AUTO: 26.9 PG (ref 27–31)
MCHC RBC AUTO-ENTMCNC: 33.3 G/DL (ref 32–36)
MCV RBC AUTO: 81 FL (ref 82–98)
MONOCYTES # BLD AUTO: 0.7 K/UL (ref 0.3–1)
MONOCYTES NFR BLD: 10.1 % (ref 4–15)
NEUTROPHILS # BLD AUTO: 4.4 K/UL (ref 1.8–7.7)
NEUTROPHILS NFR BLD: 61.7 % (ref 38–73)
NRBC BLD-RTO: 0 /100 WBC
PLATELET # BLD AUTO: 256 K/UL (ref 150–450)
PMV BLD AUTO: 11 FL (ref 9.2–12.9)
POTASSIUM SERPL-SCNC: 4.3 MMOL/L (ref 3.5–5.1)
PROT SERPL-MCNC: 6 G/DL (ref 6–8.4)
RBC # BLD AUTO: 3.42 M/UL (ref 4–5.4)
SODIUM SERPL-SCNC: 139 MMOL/L (ref 136–145)
WBC # BLD AUTO: 7.11 K/UL (ref 3.9–12.7)
WBC #/AREA STL HPF: NORMAL /[HPF]

## 2021-07-13 PROCEDURE — 25000003 PHARM REV CODE 250: Performed by: HOSPITALIST

## 2021-07-13 PROCEDURE — 85025 COMPLETE CBC W/AUTO DIFF WBC: CPT | Performed by: PHYSICIAN ASSISTANT

## 2021-07-13 PROCEDURE — 36415 COLL VENOUS BLD VENIPUNCTURE: CPT | Performed by: PHYSICIAN ASSISTANT

## 2021-07-13 PROCEDURE — 99239 PR HOSPITAL DISCHARGE DAY,>30 MIN: ICD-10-PCS | Mod: ,,, | Performed by: HOSPITALIST

## 2021-07-13 PROCEDURE — 99239 HOSP IP/OBS DSCHRG MGMT >30: CPT | Mod: ,,, | Performed by: HOSPITALIST

## 2021-07-13 PROCEDURE — 25000003 PHARM REV CODE 250: Performed by: PHYSICIAN ASSISTANT

## 2021-07-13 PROCEDURE — 80053 COMPREHEN METABOLIC PANEL: CPT | Performed by: PHYSICIAN ASSISTANT

## 2021-07-13 PROCEDURE — 83735 ASSAY OF MAGNESIUM: CPT | Performed by: PHYSICIAN ASSISTANT

## 2021-07-13 RX ADMIN — ATORVASTATIN CALCIUM 40 MG: 20 TABLET, FILM COATED ORAL at 09:07

## 2021-07-13 RX ADMIN — LISINOPRIL 10 MG: 10 TABLET ORAL at 10:07

## 2021-07-13 RX ADMIN — AMLODIPINE BESYLATE 10 MG: 10 TABLET ORAL at 09:07

## 2021-07-13 RX ADMIN — HYDROCHLOROTHIAZIDE 50 MG: 25 TABLET ORAL at 10:07

## 2021-07-13 RX ADMIN — PAROXETINE HYDROCHLORIDE 40 MG: 10 TABLET, FILM COATED ORAL at 09:07

## 2021-07-13 RX ADMIN — VANCOMYCIN HYDROCHLORIDE 125 MG: KIT at 08:07

## 2021-07-13 RX ADMIN — CHOLESTYRAMINE 4 G: 4 POWDER, FOR SUSPENSION ORAL at 09:07

## 2021-07-14 ENCOUNTER — PATIENT OUTREACH (OUTPATIENT)
Dept: ADMINISTRATIVE | Facility: CLINIC | Age: 61
End: 2021-07-14

## 2021-07-14 ENCOUNTER — NURSE TRIAGE (OUTPATIENT)
Dept: ADMINISTRATIVE | Facility: CLINIC | Age: 61
End: 2021-07-14

## 2021-07-14 LAB
E COLI SXT1 STL QL IA: NEGATIVE
E COLI SXT2 STL QL IA: NEGATIVE

## 2021-07-14 PROCEDURE — 96374 THER/PROPH/DIAG INJ IV PUSH: CPT

## 2021-07-14 PROCEDURE — 96361 HYDRATE IV INFUSION ADD-ON: CPT

## 2021-07-14 PROCEDURE — 99285 EMERGENCY DEPT VISIT HI MDM: CPT | Mod: CS,,, | Performed by: PHYSICIAN ASSISTANT

## 2021-07-14 PROCEDURE — 99285 PR EMERGENCY DEPT VISIT,LEVEL V: ICD-10-PCS | Mod: CS,,, | Performed by: PHYSICIAN ASSISTANT

## 2021-07-14 PROCEDURE — 99285 EMERGENCY DEPT VISIT HI MDM: CPT | Mod: 25

## 2021-07-15 ENCOUNTER — HOSPITAL ENCOUNTER (INPATIENT)
Facility: HOSPITAL | Age: 61
LOS: 5 days | Discharge: HOME | DRG: 373 | End: 2021-07-20
Attending: EMERGENCY MEDICINE | Admitting: HOSPITALIST
Payer: MEDICAID

## 2021-07-15 DIAGNOSIS — A04.72 C. DIFFICILE COLITIS: ICD-10-CM

## 2021-07-15 DIAGNOSIS — R07.9 CHEST PAIN: ICD-10-CM

## 2021-07-15 DIAGNOSIS — A04.72 CLOSTRIDIUM DIFFICILE DIARRHEA: ICD-10-CM

## 2021-07-15 PROBLEM — A41.9 SEPSIS WITHOUT ACUTE ORGAN DYSFUNCTION: Status: ACTIVE | Noted: 2021-07-15

## 2021-07-15 PROBLEM — D64.9 ANEMIA: Status: ACTIVE | Noted: 2021-07-15

## 2021-07-15 LAB
ALBUMIN SERPL BCP-MCNC: 3.3 G/DL (ref 3.5–5.2)
ALLENS TEST: NORMAL
ALP SERPL-CCNC: 177 U/L (ref 55–135)
ALT SERPL W/O P-5'-P-CCNC: 56 U/L (ref 10–44)
ANION GAP SERPL CALC-SCNC: 16 MMOL/L (ref 8–16)
ANION GAP SERPL CALC-SCNC: 18 MMOL/L (ref 8–16)
AST SERPL-CCNC: 50 U/L (ref 10–40)
BACTERIA #/AREA URNS AUTO: ABNORMAL /HPF
BASOPHILS # BLD AUTO: 0.04 K/UL (ref 0–0.2)
BASOPHILS # BLD AUTO: 0.05 K/UL (ref 0–0.2)
BASOPHILS NFR BLD: 0.3 % (ref 0–1.9)
BASOPHILS NFR BLD: 0.4 % (ref 0–1.9)
BILIRUB SERPL-MCNC: 0.6 MG/DL (ref 0.1–1)
BILIRUB UR QL STRIP: NEGATIVE
BUN SERPL-MCNC: 11 MG/DL (ref 6–20)
BUN SERPL-MCNC: 11 MG/DL (ref 6–20)
CALCIUM SERPL-MCNC: 10.6 MG/DL (ref 8.7–10.5)
CALCIUM SERPL-MCNC: 9 MG/DL (ref 8.7–10.5)
CHLORIDE SERPL-SCNC: 100 MMOL/L (ref 95–110)
CHLORIDE SERPL-SCNC: 105 MMOL/L (ref 95–110)
CLARITY UR REFRACT.AUTO: ABNORMAL
CO2 SERPL-SCNC: 18 MMOL/L (ref 23–29)
CO2 SERPL-SCNC: 19 MMOL/L (ref 23–29)
COLOR UR AUTO: YELLOW
CREAT SERPL-MCNC: 0.6 MG/DL (ref 0.5–1.4)
CREAT SERPL-MCNC: 0.8 MG/DL (ref 0.5–1.4)
CTP QC/QA: YES
DIFFERENTIAL METHOD: ABNORMAL
DIFFERENTIAL METHOD: ABNORMAL
EOSINOPHIL # BLD AUTO: 0.1 K/UL (ref 0–0.5)
EOSINOPHIL # BLD AUTO: 0.1 K/UL (ref 0–0.5)
EOSINOPHIL NFR BLD: 0.6 % (ref 0–8)
EOSINOPHIL NFR BLD: 1 % (ref 0–8)
ERYTHROCYTE [DISTWIDTH] IN BLOOD BY AUTOMATED COUNT: 12.5 % (ref 11.5–14.5)
ERYTHROCYTE [DISTWIDTH] IN BLOOD BY AUTOMATED COUNT: 12.5 % (ref 11.5–14.5)
EST. GFR  (AFRICAN AMERICAN): >60 ML/MIN/1.73 M^2
EST. GFR  (AFRICAN AMERICAN): >60 ML/MIN/1.73 M^2
EST. GFR  (NON AFRICAN AMERICAN): >60 ML/MIN/1.73 M^2
EST. GFR  (NON AFRICAN AMERICAN): >60 ML/MIN/1.73 M^2
ESTIMATED AVG GLUCOSE: 123 MG/DL (ref 68–131)
GLUCOSE SERPL-MCNC: 65 MG/DL (ref 70–110)
GLUCOSE SERPL-MCNC: 75 MG/DL (ref 70–110)
GLUCOSE UR QL STRIP: NEGATIVE
HBA1C MFR BLD: 5.9 % (ref 4–5.6)
HCT VFR BLD AUTO: 31.1 % (ref 37–48.5)
HCT VFR BLD AUTO: 35.1 % (ref 37–48.5)
HGB BLD-MCNC: 10.4 G/DL (ref 12–16)
HGB BLD-MCNC: 12.1 G/DL (ref 12–16)
HGB UR QL STRIP: NEGATIVE
HYALINE CASTS UR QL AUTO: 17 /LPF
IMM GRANULOCYTES # BLD AUTO: 0.16 K/UL (ref 0–0.04)
IMM GRANULOCYTES # BLD AUTO: 0.21 K/UL (ref 0–0.04)
IMM GRANULOCYTES NFR BLD AUTO: 1.4 % (ref 0–0.5)
IMM GRANULOCYTES NFR BLD AUTO: 1.6 % (ref 0–0.5)
KETONES UR QL STRIP: ABNORMAL
LDH SERPL L TO P-CCNC: 0.51 MMOL/L (ref 0.5–2.2)
LEUKOCYTE ESTERASE UR QL STRIP: ABNORMAL
LIPASE SERPL-CCNC: 113 U/L (ref 4–60)
LYMPHOCYTES # BLD AUTO: 1.9 K/UL (ref 1–4.8)
LYMPHOCYTES # BLD AUTO: 2.2 K/UL (ref 1–4.8)
LYMPHOCYTES NFR BLD: 14.5 % (ref 18–48)
LYMPHOCYTES NFR BLD: 19.3 % (ref 18–48)
MAGNESIUM SERPL-MCNC: 1.3 MG/DL (ref 1.6–2.6)
MCH RBC QN AUTO: 26.9 PG (ref 27–31)
MCH RBC QN AUTO: 27.4 PG (ref 27–31)
MCHC RBC AUTO-ENTMCNC: 33.4 G/DL (ref 32–36)
MCHC RBC AUTO-ENTMCNC: 34.5 G/DL (ref 32–36)
MCV RBC AUTO: 80 FL (ref 82–98)
MCV RBC AUTO: 81 FL (ref 82–98)
MICROSCOPIC COMMENT: ABNORMAL
MONOCYTES # BLD AUTO: 1 K/UL (ref 0.3–1)
MONOCYTES # BLD AUTO: 1 K/UL (ref 0.3–1)
MONOCYTES NFR BLD: 7.9 % (ref 4–15)
MONOCYTES NFR BLD: 8.8 % (ref 4–15)
NEUTROPHILS # BLD AUTO: 8 K/UL (ref 1.8–7.7)
NEUTROPHILS # BLD AUTO: 9.9 K/UL (ref 1.8–7.7)
NEUTROPHILS NFR BLD: 69.2 % (ref 38–73)
NEUTROPHILS NFR BLD: 75 % (ref 38–73)
NITRITE UR QL STRIP: NEGATIVE
NRBC BLD-RTO: 0 /100 WBC
NRBC BLD-RTO: 0 /100 WBC
PH UR STRIP: 5 [PH] (ref 5–8)
PHOSPHATE SERPL-MCNC: 5.4 MG/DL (ref 2.7–4.5)
PLATELET # BLD AUTO: 318 K/UL (ref 150–450)
PLATELET # BLD AUTO: 418 K/UL (ref 150–450)
PMV BLD AUTO: 10.5 FL (ref 9.2–12.9)
PMV BLD AUTO: 10.7 FL (ref 9.2–12.9)
POCT GLUCOSE: 126 MG/DL (ref 70–110)
POCT GLUCOSE: 77 MG/DL (ref 70–110)
POCT GLUCOSE: 82 MG/DL (ref 70–110)
POCT GLUCOSE: 95 MG/DL (ref 70–110)
POTASSIUM SERPL-SCNC: 3.2 MMOL/L (ref 3.5–5.1)
POTASSIUM SERPL-SCNC: 4.2 MMOL/L (ref 3.5–5.1)
PROT SERPL-MCNC: 7.4 G/DL (ref 6–8.4)
PROT UR QL STRIP: NEGATIVE
RBC # BLD AUTO: 3.86 M/UL (ref 4–5.4)
RBC # BLD AUTO: 4.41 M/UL (ref 4–5.4)
RBC #/AREA URNS AUTO: 2 /HPF (ref 0–4)
SAMPLE: NORMAL
SARS-COV-2 RDRP RESP QL NAA+PROBE: NEGATIVE
SITE: NORMAL
SODIUM SERPL-SCNC: 137 MMOL/L (ref 136–145)
SODIUM SERPL-SCNC: 139 MMOL/L (ref 136–145)
SP GR UR STRIP: 1.02 (ref 1–1.03)
SQUAMOUS #/AREA URNS AUTO: 2 /HPF
URN SPEC COLLECT METH UR: ABNORMAL
WBC # BLD AUTO: 11.5 K/UL (ref 3.9–12.7)
WBC # BLD AUTO: 13.19 K/UL (ref 3.9–12.7)
WBC #/AREA URNS AUTO: 9 /HPF (ref 0–5)

## 2021-07-15 PROCEDURE — 63600175 PHARM REV CODE 636 W HCPCS: Performed by: PHYSICIAN ASSISTANT

## 2021-07-15 PROCEDURE — 11000001 HC ACUTE MED/SURG PRIVATE ROOM

## 2021-07-15 PROCEDURE — 99223 1ST HOSP IP/OBS HIGH 75: CPT | Mod: ,,, | Performed by: PHYSICIAN ASSISTANT

## 2021-07-15 PROCEDURE — 83690 ASSAY OF LIPASE: CPT | Performed by: PHYSICIAN ASSISTANT

## 2021-07-15 PROCEDURE — 83735 ASSAY OF MAGNESIUM: CPT | Performed by: PHYSICIAN ASSISTANT

## 2021-07-15 PROCEDURE — 25000003 PHARM REV CODE 250: Performed by: PHYSICIAN ASSISTANT

## 2021-07-15 PROCEDURE — 80048 BASIC METABOLIC PNL TOTAL CA: CPT | Performed by: PHYSICIAN ASSISTANT

## 2021-07-15 PROCEDURE — 85025 COMPLETE CBC W/AUTO DIFF WBC: CPT | Performed by: PHYSICIAN ASSISTANT

## 2021-07-15 PROCEDURE — 83036 HEMOGLOBIN GLYCOSYLATED A1C: CPT | Performed by: PHYSICIAN ASSISTANT

## 2021-07-15 PROCEDURE — 83605 ASSAY OF LACTIC ACID: CPT

## 2021-07-15 PROCEDURE — 81001 URINALYSIS AUTO W/SCOPE: CPT | Performed by: PHYSICIAN ASSISTANT

## 2021-07-15 PROCEDURE — 99900035 HC TECH TIME PER 15 MIN (STAT)

## 2021-07-15 PROCEDURE — 25000003 PHARM REV CODE 250: Performed by: STUDENT IN AN ORGANIZED HEALTH CARE EDUCATION/TRAINING PROGRAM

## 2021-07-15 PROCEDURE — 99223 PR INITIAL HOSPITAL CARE,LEVL III: ICD-10-PCS | Mod: ,,, | Performed by: PHYSICIAN ASSISTANT

## 2021-07-15 PROCEDURE — 84100 ASSAY OF PHOSPHORUS: CPT | Performed by: PHYSICIAN ASSISTANT

## 2021-07-15 PROCEDURE — 85025 COMPLETE CBC W/AUTO DIFF WBC: CPT | Mod: 91 | Performed by: PHYSICIAN ASSISTANT

## 2021-07-15 PROCEDURE — U0002 COVID-19 LAB TEST NON-CDC: HCPCS | Performed by: PHYSICIAN ASSISTANT

## 2021-07-15 PROCEDURE — 80053 COMPREHEN METABOLIC PANEL: CPT | Performed by: PHYSICIAN ASSISTANT

## 2021-07-15 RX ORDER — INSULIN ASPART 100 [IU]/ML
0-5 INJECTION, SOLUTION INTRAVENOUS; SUBCUTANEOUS
Status: DISCONTINUED | OUTPATIENT
Start: 2021-07-15 | End: 2021-07-20 | Stop reason: HOSPADM

## 2021-07-15 RX ORDER — AMLODIPINE BESYLATE 10 MG/1
10 TABLET ORAL DAILY
Status: DISCONTINUED | OUTPATIENT
Start: 2021-07-15 | End: 2021-07-20 | Stop reason: HOSPADM

## 2021-07-15 RX ORDER — IPRATROPIUM BROMIDE AND ALBUTEROL SULFATE 2.5; .5 MG/3ML; MG/3ML
3 SOLUTION RESPIRATORY (INHALATION) EVERY 4 HOURS PRN
Status: DISCONTINUED | OUTPATIENT
Start: 2021-07-15 | End: 2021-07-20 | Stop reason: HOSPADM

## 2021-07-15 RX ORDER — ALPRAZOLAM 0.5 MG/1
1 TABLET ORAL 2 TIMES DAILY PRN
Status: DISCONTINUED | OUTPATIENT
Start: 2021-07-15 | End: 2021-07-20 | Stop reason: HOSPADM

## 2021-07-15 RX ORDER — LISINOPRIL 10 MG/1
10 TABLET ORAL DAILY
Status: DISCONTINUED | OUTPATIENT
Start: 2021-07-15 | End: 2021-07-20 | Stop reason: HOSPADM

## 2021-07-15 RX ORDER — ACETAMINOPHEN 325 MG/1
650 TABLET ORAL EVERY 4 HOURS PRN
Status: DISCONTINUED | OUTPATIENT
Start: 2021-07-15 | End: 2021-07-15

## 2021-07-15 RX ORDER — IBUPROFEN 200 MG
24 TABLET ORAL
Status: DISCONTINUED | OUTPATIENT
Start: 2021-07-15 | End: 2021-07-20 | Stop reason: HOSPADM

## 2021-07-15 RX ORDER — BISACODYL 10 MG
10 SUPPOSITORY, RECTAL RECTAL DAILY PRN
Status: DISCONTINUED | OUTPATIENT
Start: 2021-07-15 | End: 2021-07-20 | Stop reason: HOSPADM

## 2021-07-15 RX ORDER — GLUCAGON 1 MG
1 KIT INJECTION
Status: DISCONTINUED | OUTPATIENT
Start: 2021-07-15 | End: 2021-07-20 | Stop reason: HOSPADM

## 2021-07-15 RX ORDER — CHOLESTYRAMINE 4 G/9G
1 POWDER, FOR SUSPENSION ORAL 2 TIMES DAILY
Status: DISCONTINUED | OUTPATIENT
Start: 2021-07-15 | End: 2021-07-18

## 2021-07-15 RX ORDER — IBUPROFEN 200 MG
16 TABLET ORAL
Status: DISCONTINUED | OUTPATIENT
Start: 2021-07-15 | End: 2021-07-20 | Stop reason: HOSPADM

## 2021-07-15 RX ORDER — PROCHLORPERAZINE EDISYLATE 5 MG/ML
5 INJECTION INTRAMUSCULAR; INTRAVENOUS EVERY 6 HOURS PRN
Status: DISCONTINUED | OUTPATIENT
Start: 2021-07-15 | End: 2021-07-20 | Stop reason: HOSPADM

## 2021-07-15 RX ORDER — BUTALBITAL, ACETAMINOPHEN AND CAFFEINE 50; 325; 40 MG/1; MG/1; MG/1
1 TABLET ORAL 2 TIMES DAILY PRN
Status: DISCONTINUED | OUTPATIENT
Start: 2021-07-15 | End: 2021-07-20 | Stop reason: HOSPADM

## 2021-07-15 RX ORDER — BISMUTH SUBSALICYLATE 525 MG/30ML
30 LIQUID ORAL EVERY 6 HOURS PRN
Status: DISCONTINUED | OUTPATIENT
Start: 2021-07-15 | End: 2021-07-20 | Stop reason: HOSPADM

## 2021-07-15 RX ORDER — ONDANSETRON 2 MG/ML
4 INJECTION INTRAMUSCULAR; INTRAVENOUS
Status: COMPLETED | OUTPATIENT
Start: 2021-07-15 | End: 2021-07-15

## 2021-07-15 RX ORDER — HYOSCYAMINE SULFATE 0.12 MG/1
0.12 TABLET SUBLINGUAL
Status: COMPLETED | OUTPATIENT
Start: 2021-07-15 | End: 2021-07-15

## 2021-07-15 RX ORDER — TALC
6 POWDER (GRAM) TOPICAL NIGHTLY PRN
Status: DISCONTINUED | OUTPATIENT
Start: 2021-07-15 | End: 2021-07-20 | Stop reason: HOSPADM

## 2021-07-15 RX ORDER — HYDROCODONE BITARTRATE AND ACETAMINOPHEN 5; 325 MG/1; MG/1
1 TABLET ORAL EVERY 6 HOURS PRN
Status: DISCONTINUED | OUTPATIENT
Start: 2021-07-15 | End: 2021-07-20 | Stop reason: HOSPADM

## 2021-07-15 RX ORDER — PAROXETINE HYDROCHLORIDE 20 MG/1
40 TABLET, FILM COATED ORAL DAILY
Status: DISCONTINUED | OUTPATIENT
Start: 2021-07-15 | End: 2021-07-20 | Stop reason: HOSPADM

## 2021-07-15 RX ORDER — ACETAMINOPHEN 325 MG/1
650 TABLET ORAL EVERY 6 HOURS PRN
Status: DISCONTINUED | OUTPATIENT
Start: 2021-07-15 | End: 2021-07-20 | Stop reason: HOSPADM

## 2021-07-15 RX ORDER — POTASSIUM CHLORIDE 20 MEQ/1
40 TABLET, EXTENDED RELEASE ORAL ONCE
Status: COMPLETED | OUTPATIENT
Start: 2021-07-15 | End: 2021-07-15

## 2021-07-15 RX ORDER — SODIUM CHLORIDE, SODIUM LACTATE, POTASSIUM CHLORIDE, CALCIUM CHLORIDE 600; 310; 30; 20 MG/100ML; MG/100ML; MG/100ML; MG/100ML
INJECTION, SOLUTION INTRAVENOUS CONTINUOUS
Status: DISCONTINUED | OUTPATIENT
Start: 2021-07-15 | End: 2021-07-18

## 2021-07-15 RX ORDER — HYOSCYAMINE SULFATE 0.12 MG/1
0.12 TABLET SUBLINGUAL EVERY 4 HOURS PRN
Status: DISCONTINUED | OUTPATIENT
Start: 2021-07-15 | End: 2021-07-20 | Stop reason: HOSPADM

## 2021-07-15 RX ORDER — POLYETHYLENE GLYCOL 3350 17 G/17G
17 POWDER, FOR SOLUTION ORAL DAILY
Status: DISCONTINUED | OUTPATIENT
Start: 2021-07-15 | End: 2021-07-15

## 2021-07-15 RX ORDER — SODIUM CHLORIDE 0.9 % (FLUSH) 0.9 %
5 SYRINGE (ML) INJECTION
Status: DISCONTINUED | OUTPATIENT
Start: 2021-07-15 | End: 2021-07-20 | Stop reason: HOSPADM

## 2021-07-15 RX ORDER — ATORVASTATIN CALCIUM 20 MG/1
40 TABLET, FILM COATED ORAL DAILY
Status: DISCONTINUED | OUTPATIENT
Start: 2021-07-15 | End: 2021-07-20 | Stop reason: HOSPADM

## 2021-07-15 RX ORDER — AMITRIPTYLINE HYDROCHLORIDE 150 MG/1
150 TABLET ORAL NIGHTLY
Status: DISCONTINUED | OUTPATIENT
Start: 2021-07-15 | End: 2021-07-20 | Stop reason: HOSPADM

## 2021-07-15 RX ORDER — ONDANSETRON 8 MG/1
8 TABLET, ORALLY DISINTEGRATING ORAL EVERY 8 HOURS PRN
Status: DISCONTINUED | OUTPATIENT
Start: 2021-07-15 | End: 2021-07-20 | Stop reason: HOSPADM

## 2021-07-15 RX ADMIN — VANCOMYCIN HYDROCHLORIDE 125 MG: KIT at 11:07

## 2021-07-15 RX ADMIN — POTASSIUM CHLORIDE 40 MEQ: 1500 TABLET, EXTENDED RELEASE ORAL at 02:07

## 2021-07-15 RX ADMIN — VANCOMYCIN HYDROCHLORIDE 125 MG: KIT at 05:07

## 2021-07-15 RX ADMIN — ONDANSETRON 4 MG: 2 INJECTION INTRAMUSCULAR; INTRAVENOUS at 01:07

## 2021-07-15 RX ADMIN — VANCOMYCIN HYDROCHLORIDE 125 MG: KIT at 02:07

## 2021-07-15 RX ADMIN — PAROXETINE HYDROCHLORIDE 40 MG: 20 TABLET, FILM COATED ORAL at 10:07

## 2021-07-15 RX ADMIN — AMLODIPINE BESYLATE 10 MG: 10 TABLET ORAL at 10:07

## 2021-07-15 RX ADMIN — LISINOPRIL 10 MG: 10 TABLET ORAL at 10:07

## 2021-07-15 RX ADMIN — SODIUM CHLORIDE, SODIUM LACTATE, POTASSIUM CHLORIDE, AND CALCIUM CHLORIDE: .6; .31; .03; .02 INJECTION, SOLUTION INTRAVENOUS at 04:07

## 2021-07-15 RX ADMIN — SODIUM CHLORIDE, SODIUM LACTATE, POTASSIUM CHLORIDE, AND CALCIUM CHLORIDE: .6; .31; .03; .02 INJECTION, SOLUTION INTRAVENOUS at 08:07

## 2021-07-15 RX ADMIN — SODIUM CHLORIDE 1000 ML: 0.9 INJECTION, SOLUTION INTRAVENOUS at 01:07

## 2021-07-15 RX ADMIN — CHOLESTYRAMINE 4 G: 4 POWDER, FOR SUSPENSION ORAL at 09:07

## 2021-07-15 RX ADMIN — HYOSCYAMINE SULFATE 0.12 MG: 0.12 TABLET ORAL; SUBLINGUAL at 12:07

## 2021-07-15 RX ADMIN — MELATONIN TAB 3 MG 6 MG: 3 TAB at 10:07

## 2021-07-15 RX ADMIN — ALPRAZOLAM 1 MG: 0.5 TABLET ORAL at 10:07

## 2021-07-15 RX ADMIN — SODIUM CHLORIDE, SODIUM LACTATE, POTASSIUM CHLORIDE, AND CALCIUM CHLORIDE: .6; .31; .03; .02 INJECTION, SOLUTION INTRAVENOUS at 12:07

## 2021-07-15 RX ADMIN — ATORVASTATIN CALCIUM 40 MG: 20 TABLET, FILM COATED ORAL at 10:07

## 2021-07-15 RX ADMIN — AMITRIPTYLINE HYDROCHLORIDE 150 MG: 150 TABLET, FILM COATED ORAL at 08:07

## 2021-07-15 RX ADMIN — CHOLESTYRAMINE 4 G: 4 POWDER, FOR SUSPENSION ORAL at 10:07

## 2021-07-16 LAB
ANION GAP SERPL CALC-SCNC: 11 MMOL/L (ref 8–16)
BACTERIA STL CULT: NORMAL
BASOPHILS # BLD AUTO: 0.05 K/UL (ref 0–0.2)
BASOPHILS NFR BLD: 0.7 % (ref 0–1.9)
BUN SERPL-MCNC: 7 MG/DL (ref 6–20)
CALCIUM SERPL-MCNC: 9.3 MG/DL (ref 8.7–10.5)
CHLORIDE SERPL-SCNC: 108 MMOL/L (ref 95–110)
CO2 SERPL-SCNC: 22 MMOL/L (ref 23–29)
CREAT SERPL-MCNC: 0.5 MG/DL (ref 0.5–1.4)
DIFFERENTIAL METHOD: ABNORMAL
EOSINOPHIL # BLD AUTO: 0.2 K/UL (ref 0–0.5)
EOSINOPHIL NFR BLD: 2.1 % (ref 0–8)
ERYTHROCYTE [DISTWIDTH] IN BLOOD BY AUTOMATED COUNT: 12.4 % (ref 11.5–14.5)
EST. GFR  (AFRICAN AMERICAN): >60 ML/MIN/1.73 M^2
EST. GFR  (NON AFRICAN AMERICAN): >60 ML/MIN/1.73 M^2
GLUCOSE SERPL-MCNC: 67 MG/DL (ref 70–110)
HCT VFR BLD AUTO: 29.7 % (ref 37–48.5)
HGB BLD-MCNC: 9.9 G/DL (ref 12–16)
IMM GRANULOCYTES # BLD AUTO: 0.17 K/UL (ref 0–0.04)
IMM GRANULOCYTES NFR BLD AUTO: 2.3 % (ref 0–0.5)
LYMPHOCYTES # BLD AUTO: 2 K/UL (ref 1–4.8)
LYMPHOCYTES NFR BLD: 26.6 % (ref 18–48)
MAGNESIUM SERPL-MCNC: 1.3 MG/DL (ref 1.6–2.6)
MCH RBC QN AUTO: 27 PG (ref 27–31)
MCHC RBC AUTO-ENTMCNC: 33.3 G/DL (ref 32–36)
MCV RBC AUTO: 81 FL (ref 82–98)
MONOCYTES # BLD AUTO: 0.8 K/UL (ref 0.3–1)
MONOCYTES NFR BLD: 10.6 % (ref 4–15)
NEUTROPHILS # BLD AUTO: 4.3 K/UL (ref 1.8–7.7)
NEUTROPHILS NFR BLD: 57.7 % (ref 38–73)
NRBC BLD-RTO: 0 /100 WBC
PHOSPHATE SERPL-MCNC: 3.4 MG/DL (ref 2.7–4.5)
PLATELET # BLD AUTO: 282 K/UL (ref 150–450)
PMV BLD AUTO: 10.2 FL (ref 9.2–12.9)
POCT GLUCOSE: 123 MG/DL (ref 70–110)
POCT GLUCOSE: 78 MG/DL (ref 70–110)
POCT GLUCOSE: 83 MG/DL (ref 70–110)
POTASSIUM SERPL-SCNC: 3.3 MMOL/L (ref 3.5–5.1)
RBC # BLD AUTO: 3.67 M/UL (ref 4–5.4)
SODIUM SERPL-SCNC: 141 MMOL/L (ref 136–145)
WBC # BLD AUTO: 7.47 K/UL (ref 3.9–12.7)

## 2021-07-16 PROCEDURE — 36415 COLL VENOUS BLD VENIPUNCTURE: CPT | Performed by: PHYSICIAN ASSISTANT

## 2021-07-16 PROCEDURE — 25000003 PHARM REV CODE 250: Performed by: PHYSICIAN ASSISTANT

## 2021-07-16 PROCEDURE — 99232 PR SUBSEQUENT HOSPITAL CARE,LEVL II: ICD-10-PCS | Mod: ,,, | Performed by: STUDENT IN AN ORGANIZED HEALTH CARE EDUCATION/TRAINING PROGRAM

## 2021-07-16 PROCEDURE — 85025 COMPLETE CBC W/AUTO DIFF WBC: CPT | Performed by: PHYSICIAN ASSISTANT

## 2021-07-16 PROCEDURE — 63600175 PHARM REV CODE 636 W HCPCS: Performed by: STUDENT IN AN ORGANIZED HEALTH CARE EDUCATION/TRAINING PROGRAM

## 2021-07-16 PROCEDURE — 83735 ASSAY OF MAGNESIUM: CPT | Performed by: PHYSICIAN ASSISTANT

## 2021-07-16 PROCEDURE — 80048 BASIC METABOLIC PNL TOTAL CA: CPT | Performed by: PHYSICIAN ASSISTANT

## 2021-07-16 PROCEDURE — 99232 SBSQ HOSP IP/OBS MODERATE 35: CPT | Mod: ,,, | Performed by: STUDENT IN AN ORGANIZED HEALTH CARE EDUCATION/TRAINING PROGRAM

## 2021-07-16 PROCEDURE — 63600175 PHARM REV CODE 636 W HCPCS: Performed by: PHYSICIAN ASSISTANT

## 2021-07-16 PROCEDURE — 25000003 PHARM REV CODE 250: Performed by: STUDENT IN AN ORGANIZED HEALTH CARE EDUCATION/TRAINING PROGRAM

## 2021-07-16 PROCEDURE — 11000001 HC ACUTE MED/SURG PRIVATE ROOM

## 2021-07-16 PROCEDURE — 84100 ASSAY OF PHOSPHORUS: CPT | Performed by: PHYSICIAN ASSISTANT

## 2021-07-16 RX ORDER — MAGNESIUM SULFATE HEPTAHYDRATE 40 MG/ML
4 INJECTION, SOLUTION INTRAVENOUS ONCE
Status: COMPLETED | OUTPATIENT
Start: 2021-07-16 | End: 2021-07-16

## 2021-07-16 RX ADMIN — SODIUM CHLORIDE, SODIUM LACTATE, POTASSIUM CHLORIDE, AND CALCIUM CHLORIDE: .6; .31; .03; .02 INJECTION, SOLUTION INTRAVENOUS at 05:07

## 2021-07-16 RX ADMIN — AMITRIPTYLINE HYDROCHLORIDE 150 MG: 150 TABLET, FILM COATED ORAL at 10:07

## 2021-07-16 RX ADMIN — CHOLESTYRAMINE 4 G: 4 POWDER, FOR SUSPENSION ORAL at 10:07

## 2021-07-16 RX ADMIN — POTASSIUM BICARBONATE 50 MEQ: 978 TABLET, EFFERVESCENT ORAL at 12:07

## 2021-07-16 RX ADMIN — ATORVASTATIN CALCIUM 40 MG: 20 TABLET, FILM COATED ORAL at 09:07

## 2021-07-16 RX ADMIN — LISINOPRIL 10 MG: 10 TABLET ORAL at 09:07

## 2021-07-16 RX ADMIN — AMLODIPINE BESYLATE 10 MG: 10 TABLET ORAL at 09:07

## 2021-07-16 RX ADMIN — CHOLESTYRAMINE 4 G: 4 POWDER, FOR SUSPENSION ORAL at 09:07

## 2021-07-16 RX ADMIN — VANCOMYCIN HYDROCHLORIDE 125 MG: KIT at 05:07

## 2021-07-16 RX ADMIN — VANCOMYCIN HYDROCHLORIDE 125 MG: KIT at 12:07

## 2021-07-16 RX ADMIN — MAGNESIUM SULFATE 4 G: 2 INJECTION INTRAVENOUS at 12:07

## 2021-07-16 RX ADMIN — PAROXETINE HYDROCHLORIDE 40 MG: 20 TABLET, FILM COATED ORAL at 09:07

## 2021-07-17 LAB
ALBUMIN SERPL BCP-MCNC: 2.7 G/DL (ref 3.5–5.2)
ALP SERPL-CCNC: 119 U/L (ref 55–135)
ALT SERPL W/O P-5'-P-CCNC: 57 U/L (ref 10–44)
ANION GAP SERPL CALC-SCNC: 8 MMOL/L (ref 8–16)
AST SERPL-CCNC: 52 U/L (ref 10–40)
BASOPHILS # BLD AUTO: 0.05 K/UL (ref 0–0.2)
BASOPHILS NFR BLD: 0.8 % (ref 0–1.9)
BILIRUB SERPL-MCNC: 0.3 MG/DL (ref 0.1–1)
BUN SERPL-MCNC: 5 MG/DL (ref 6–20)
CALCIUM SERPL-MCNC: 9.2 MG/DL (ref 8.7–10.5)
CHLORIDE SERPL-SCNC: 112 MMOL/L (ref 95–110)
CO2 SERPL-SCNC: 24 MMOL/L (ref 23–29)
CREAT SERPL-MCNC: 0.5 MG/DL (ref 0.5–1.4)
DIFFERENTIAL METHOD: ABNORMAL
EOSINOPHIL # BLD AUTO: 0.2 K/UL (ref 0–0.5)
EOSINOPHIL NFR BLD: 2.6 % (ref 0–8)
ERYTHROCYTE [DISTWIDTH] IN BLOOD BY AUTOMATED COUNT: 12.4 % (ref 11.5–14.5)
EST. GFR  (AFRICAN AMERICAN): >60 ML/MIN/1.73 M^2
EST. GFR  (NON AFRICAN AMERICAN): >60 ML/MIN/1.73 M^2
GLUCOSE SERPL-MCNC: 98 MG/DL (ref 70–110)
HCT VFR BLD AUTO: 29.1 % (ref 37–48.5)
HGB BLD-MCNC: 9.8 G/DL (ref 12–16)
IMM GRANULOCYTES # BLD AUTO: 0.21 K/UL (ref 0–0.04)
IMM GRANULOCYTES NFR BLD AUTO: 3.4 % (ref 0–0.5)
LYMPHOCYTES # BLD AUTO: 1.8 K/UL (ref 1–4.8)
LYMPHOCYTES NFR BLD: 29 % (ref 18–48)
MAGNESIUM SERPL-MCNC: 1.6 MG/DL (ref 1.6–2.6)
MCH RBC QN AUTO: 27.3 PG (ref 27–31)
MCHC RBC AUTO-ENTMCNC: 33.7 G/DL (ref 32–36)
MCV RBC AUTO: 81 FL (ref 82–98)
MONOCYTES # BLD AUTO: 0.5 K/UL (ref 0.3–1)
MONOCYTES NFR BLD: 8.7 % (ref 4–15)
NEUTROPHILS # BLD AUTO: 3.4 K/UL (ref 1.8–7.7)
NEUTROPHILS NFR BLD: 55.5 % (ref 38–73)
NRBC BLD-RTO: 0 /100 WBC
PHOSPHATE SERPL-MCNC: 3.4 MG/DL (ref 2.7–4.5)
PLATELET # BLD AUTO: 293 K/UL (ref 150–450)
PMV BLD AUTO: 10.2 FL (ref 9.2–12.9)
POCT GLUCOSE: 104 MG/DL (ref 70–110)
POCT GLUCOSE: 105 MG/DL (ref 70–110)
POCT GLUCOSE: 97 MG/DL (ref 70–110)
POTASSIUM SERPL-SCNC: 3.5 MMOL/L (ref 3.5–5.1)
PROT SERPL-MCNC: 5.4 G/DL (ref 6–8.4)
RBC # BLD AUTO: 3.59 M/UL (ref 4–5.4)
SODIUM SERPL-SCNC: 144 MMOL/L (ref 136–145)
WBC # BLD AUTO: 6.18 K/UL (ref 3.9–12.7)

## 2021-07-17 PROCEDURE — 80053 COMPREHEN METABOLIC PANEL: CPT | Performed by: STUDENT IN AN ORGANIZED HEALTH CARE EDUCATION/TRAINING PROGRAM

## 2021-07-17 PROCEDURE — 99232 PR SUBSEQUENT HOSPITAL CARE,LEVL II: ICD-10-PCS | Mod: ,,, | Performed by: STUDENT IN AN ORGANIZED HEALTH CARE EDUCATION/TRAINING PROGRAM

## 2021-07-17 PROCEDURE — 83735 ASSAY OF MAGNESIUM: CPT | Performed by: PHYSICIAN ASSISTANT

## 2021-07-17 PROCEDURE — 63600175 PHARM REV CODE 636 W HCPCS: Performed by: PHYSICIAN ASSISTANT

## 2021-07-17 PROCEDURE — 99232 SBSQ HOSP IP/OBS MODERATE 35: CPT | Mod: ,,, | Performed by: STUDENT IN AN ORGANIZED HEALTH CARE EDUCATION/TRAINING PROGRAM

## 2021-07-17 PROCEDURE — 11000001 HC ACUTE MED/SURG PRIVATE ROOM

## 2021-07-17 PROCEDURE — 85025 COMPLETE CBC W/AUTO DIFF WBC: CPT | Performed by: PHYSICIAN ASSISTANT

## 2021-07-17 PROCEDURE — 25000003 PHARM REV CODE 250: Performed by: PHYSICIAN ASSISTANT

## 2021-07-17 PROCEDURE — 84100 ASSAY OF PHOSPHORUS: CPT | Performed by: PHYSICIAN ASSISTANT

## 2021-07-17 PROCEDURE — 36415 COLL VENOUS BLD VENIPUNCTURE: CPT | Performed by: PHYSICIAN ASSISTANT

## 2021-07-17 RX ADMIN — CHOLESTYRAMINE 4 G: 4 POWDER, FOR SUSPENSION ORAL at 09:07

## 2021-07-17 RX ADMIN — VANCOMYCIN HYDROCHLORIDE 125 MG: KIT at 06:07

## 2021-07-17 RX ADMIN — SODIUM CHLORIDE, SODIUM LACTATE, POTASSIUM CHLORIDE, AND CALCIUM CHLORIDE: .6; .31; .03; .02 INJECTION, SOLUTION INTRAVENOUS at 04:07

## 2021-07-17 RX ADMIN — CHOLESTYRAMINE 4 G: 4 POWDER, FOR SUSPENSION ORAL at 10:07

## 2021-07-17 RX ADMIN — LISINOPRIL 10 MG: 10 TABLET ORAL at 09:07

## 2021-07-17 RX ADMIN — ALPRAZOLAM 1 MG: 0.5 TABLET ORAL at 10:07

## 2021-07-17 RX ADMIN — VANCOMYCIN HYDROCHLORIDE 125 MG: KIT at 12:07

## 2021-07-17 RX ADMIN — AMLODIPINE BESYLATE 10 MG: 10 TABLET ORAL at 09:07

## 2021-07-17 RX ADMIN — MELATONIN TAB 3 MG 6 MG: 3 TAB at 10:07

## 2021-07-17 RX ADMIN — PAROXETINE HYDROCHLORIDE 40 MG: 20 TABLET, FILM COATED ORAL at 09:07

## 2021-07-17 RX ADMIN — AMITRIPTYLINE HYDROCHLORIDE 150 MG: 150 TABLET, FILM COATED ORAL at 09:07

## 2021-07-17 RX ADMIN — VANCOMYCIN HYDROCHLORIDE 125 MG: KIT at 11:07

## 2021-07-17 RX ADMIN — ATORVASTATIN CALCIUM 40 MG: 20 TABLET, FILM COATED ORAL at 09:07

## 2021-07-17 RX ADMIN — SODIUM CHLORIDE, SODIUM LACTATE, POTASSIUM CHLORIDE, AND CALCIUM CHLORIDE: .6; .31; .03; .02 INJECTION, SOLUTION INTRAVENOUS at 10:07

## 2021-07-18 LAB
POCT GLUCOSE: 154 MG/DL (ref 70–110)
POCT GLUCOSE: 97 MG/DL (ref 70–110)

## 2021-07-18 PROCEDURE — 25000003 PHARM REV CODE 250: Performed by: PHYSICIAN ASSISTANT

## 2021-07-18 PROCEDURE — 11000001 HC ACUTE MED/SURG PRIVATE ROOM

## 2021-07-18 PROCEDURE — 99232 SBSQ HOSP IP/OBS MODERATE 35: CPT | Mod: ,,, | Performed by: STUDENT IN AN ORGANIZED HEALTH CARE EDUCATION/TRAINING PROGRAM

## 2021-07-18 PROCEDURE — 99232 PR SUBSEQUENT HOSPITAL CARE,LEVL II: ICD-10-PCS | Mod: ,,, | Performed by: STUDENT IN AN ORGANIZED HEALTH CARE EDUCATION/TRAINING PROGRAM

## 2021-07-18 RX ORDER — ONDANSETRON 4 MG/1
4 TABLET, ORALLY DISINTEGRATING ORAL EVERY 6 HOURS PRN
Qty: 12 TABLET | Refills: 0 | Status: SHIPPED | OUTPATIENT
Start: 2021-07-18

## 2021-07-18 RX ADMIN — VANCOMYCIN HYDROCHLORIDE 125 MG: KIT at 11:07

## 2021-07-18 RX ADMIN — VANCOMYCIN HYDROCHLORIDE 125 MG: KIT at 05:07

## 2021-07-18 RX ADMIN — ATORVASTATIN CALCIUM 40 MG: 20 TABLET, FILM COATED ORAL at 11:07

## 2021-07-18 RX ADMIN — AMITRIPTYLINE HYDROCHLORIDE 150 MG: 150 TABLET, FILM COATED ORAL at 08:07

## 2021-07-18 RX ADMIN — AMLODIPINE BESYLATE 10 MG: 10 TABLET ORAL at 11:07

## 2021-07-18 RX ADMIN — LISINOPRIL 10 MG: 10 TABLET ORAL at 11:07

## 2021-07-18 RX ADMIN — PAROXETINE HYDROCHLORIDE 40 MG: 20 TABLET, FILM COATED ORAL at 11:07

## 2021-07-19 LAB
POCT GLUCOSE: 119 MG/DL (ref 70–110)
POCT GLUCOSE: 126 MG/DL (ref 70–110)
POCT GLUCOSE: 131 MG/DL (ref 70–110)
POCT GLUCOSE: 93 MG/DL (ref 70–110)

## 2021-07-19 PROCEDURE — 99232 PR SUBSEQUENT HOSPITAL CARE,LEVL II: ICD-10-PCS | Mod: ,,, | Performed by: STUDENT IN AN ORGANIZED HEALTH CARE EDUCATION/TRAINING PROGRAM

## 2021-07-19 PROCEDURE — 25000003 PHARM REV CODE 250: Performed by: PHYSICIAN ASSISTANT

## 2021-07-19 PROCEDURE — 99232 SBSQ HOSP IP/OBS MODERATE 35: CPT | Mod: ,,, | Performed by: STUDENT IN AN ORGANIZED HEALTH CARE EDUCATION/TRAINING PROGRAM

## 2021-07-19 PROCEDURE — 11000001 HC ACUTE MED/SURG PRIVATE ROOM

## 2021-07-19 RX ADMIN — VANCOMYCIN HYDROCHLORIDE 125 MG: KIT at 11:07

## 2021-07-19 RX ADMIN — AMITRIPTYLINE HYDROCHLORIDE 150 MG: 150 TABLET, FILM COATED ORAL at 08:07

## 2021-07-19 RX ADMIN — LISINOPRIL 10 MG: 10 TABLET ORAL at 09:07

## 2021-07-19 RX ADMIN — PAROXETINE HYDROCHLORIDE 40 MG: 20 TABLET, FILM COATED ORAL at 09:07

## 2021-07-19 RX ADMIN — VANCOMYCIN HYDROCHLORIDE 125 MG: KIT at 06:07

## 2021-07-19 RX ADMIN — VANCOMYCIN HYDROCHLORIDE 125 MG: KIT at 04:07

## 2021-07-19 RX ADMIN — AMLODIPINE BESYLATE 10 MG: 10 TABLET ORAL at 09:07

## 2021-07-19 RX ADMIN — ATORVASTATIN CALCIUM 40 MG: 20 TABLET, FILM COATED ORAL at 09:07

## 2021-07-20 VITALS
SYSTOLIC BLOOD PRESSURE: 139 MMHG | HEART RATE: 117 BPM | TEMPERATURE: 98 F | HEIGHT: 64 IN | OXYGEN SATURATION: 98 % | WEIGHT: 150 LBS | RESPIRATION RATE: 18 BRPM | BODY MASS INDEX: 25.61 KG/M2 | DIASTOLIC BLOOD PRESSURE: 62 MMHG

## 2021-07-20 LAB
POCT GLUCOSE: 102 MG/DL (ref 70–110)
POCT GLUCOSE: 73 MG/DL (ref 70–110)

## 2021-07-20 PROCEDURE — 99239 PR HOSPITAL DISCHARGE DAY,>30 MIN: ICD-10-PCS | Mod: ,,, | Performed by: STUDENT IN AN ORGANIZED HEALTH CARE EDUCATION/TRAINING PROGRAM

## 2021-07-20 PROCEDURE — 99239 HOSP IP/OBS DSCHRG MGMT >30: CPT | Mod: ,,, | Performed by: STUDENT IN AN ORGANIZED HEALTH CARE EDUCATION/TRAINING PROGRAM

## 2021-07-20 PROCEDURE — 25000003 PHARM REV CODE 250: Performed by: PHYSICIAN ASSISTANT

## 2021-07-20 RX ADMIN — LISINOPRIL 10 MG: 10 TABLET ORAL at 09:07

## 2021-07-20 RX ADMIN — AMLODIPINE BESYLATE 10 MG: 10 TABLET ORAL at 09:07

## 2021-07-20 RX ADMIN — VANCOMYCIN HYDROCHLORIDE 125 MG: KIT at 05:07

## 2021-07-20 RX ADMIN — VANCOMYCIN HYDROCHLORIDE 125 MG: KIT at 11:07

## 2021-07-20 RX ADMIN — ATORVASTATIN CALCIUM 40 MG: 20 TABLET, FILM COATED ORAL at 09:07

## 2021-07-20 RX ADMIN — PAROXETINE HYDROCHLORIDE 40 MG: 20 TABLET, FILM COATED ORAL at 09:07

## 2021-07-20 RX ADMIN — VANCOMYCIN HYDROCHLORIDE 125 MG: KIT at 12:07

## 2021-07-22 ENCOUNTER — PATIENT OUTREACH (OUTPATIENT)
Dept: ADMINISTRATIVE | Facility: CLINIC | Age: 61
End: 2021-07-22

## 2021-07-23 ENCOUNTER — NURSE TRIAGE (OUTPATIENT)
Dept: ADMINISTRATIVE | Facility: CLINIC | Age: 61
End: 2021-07-23

## 2021-07-25 ENCOUNTER — NURSE TRIAGE (OUTPATIENT)
Dept: ADMINISTRATIVE | Facility: CLINIC | Age: 61
End: 2021-07-25

## 2021-08-05 ENCOUNTER — NURSE TRIAGE (OUTPATIENT)
Dept: ADMINISTRATIVE | Facility: CLINIC | Age: 61
End: 2021-08-05

## 2021-08-08 ENCOUNTER — HOSPITAL ENCOUNTER (INPATIENT)
Facility: HOSPITAL | Age: 61
LOS: 11 days | Discharge: HOME HEALTH | DRG: 871 | End: 2021-08-19
Attending: EMERGENCY MEDICINE | Admitting: EMERGENCY MEDICINE
Payer: MEDICAID

## 2021-08-08 DIAGNOSIS — R00.0 TACHYCARDIA: ICD-10-CM

## 2021-08-08 DIAGNOSIS — R41.82 AMS (ALTERED MENTAL STATUS): Primary | ICD-10-CM

## 2021-08-08 DIAGNOSIS — A04.72 C. DIFFICILE COLITIS: ICD-10-CM

## 2021-08-08 DIAGNOSIS — I48.92 ATRIAL FLUTTER: ICD-10-CM

## 2021-08-08 DIAGNOSIS — R94.6 ABNORMAL THYROID FUNCTION TEST: ICD-10-CM

## 2021-08-08 DIAGNOSIS — A41.9 SEPSIS, DUE TO UNSPECIFIED ORGANISM, UNSPECIFIED WHETHER ACUTE ORGAN DYSFUNCTION PRESENT: ICD-10-CM

## 2021-08-08 DIAGNOSIS — E05.00 GRAVES DISEASE: ICD-10-CM

## 2021-08-08 PROBLEM — R65.20 SEVERE SEPSIS: Status: ACTIVE | Noted: 2021-08-08

## 2021-08-08 LAB
ALBUMIN SERPL BCP-MCNC: 2.9 G/DL (ref 3.5–5.2)
ALP SERPL-CCNC: 146 U/L (ref 55–135)
ALT SERPL W/O P-5'-P-CCNC: 24 U/L (ref 10–44)
AMPHET+METHAMPHET UR QL: NEGATIVE
ANION GAP SERPL CALC-SCNC: 13 MMOL/L (ref 8–16)
AST SERPL-CCNC: 23 U/L (ref 10–40)
BARBITURATES UR QL SCN>200 NG/ML: NEGATIVE
BASOPHILS # BLD AUTO: 0.02 K/UL (ref 0–0.2)
BASOPHILS NFR BLD: 0.1 % (ref 0–1.9)
BENZODIAZ UR QL SCN>200 NG/ML: ABNORMAL
BILIRUB SERPL-MCNC: 1.2 MG/DL (ref 0.1–1)
BILIRUB UR QL STRIP: NEGATIVE
BUN SERPL-MCNC: 18 MG/DL (ref 6–20)
BUN SERPL-MCNC: 18 MG/DL (ref 6–30)
BZE UR QL SCN: NEGATIVE
CALCIUM SERPL-MCNC: 10.6 MG/DL (ref 8.7–10.5)
CANNABINOIDS UR QL SCN: NEGATIVE
CHLORIDE SERPL-SCNC: 97 MMOL/L (ref 95–110)
CHLORIDE SERPL-SCNC: 99 MMOL/L (ref 95–110)
CLARITY UR REFRACT.AUTO: CLEAR
CO2 SERPL-SCNC: 26 MMOL/L (ref 23–29)
COLOR UR AUTO: ABNORMAL
CREAT SERPL-MCNC: 0.8 MG/DL (ref 0.5–1.4)
CREAT SERPL-MCNC: 0.8 MG/DL (ref 0.5–1.4)
CREAT UR-MCNC: 103 MG/DL (ref 15–325)
CTP QC/QA: YES
DIFFERENTIAL METHOD: ABNORMAL
EOSINOPHIL # BLD AUTO: 0 K/UL (ref 0–0.5)
EOSINOPHIL NFR BLD: 0.1 % (ref 0–8)
ERYTHROCYTE [DISTWIDTH] IN BLOOD BY AUTOMATED COUNT: 12.6 % (ref 11.5–14.5)
EST. GFR  (AFRICAN AMERICAN): >60 ML/MIN/1.73 M^2
EST. GFR  (NON AFRICAN AMERICAN): >60 ML/MIN/1.73 M^2
GLUCOSE SERPL-MCNC: 157 MG/DL (ref 70–110)
GLUCOSE SERPL-MCNC: 161 MG/DL (ref 70–110)
GLUCOSE UR QL STRIP: NEGATIVE
HCT VFR BLD AUTO: 31.8 % (ref 37–48.5)
HCT VFR BLD CALC: 30 %PCV (ref 36–54)
HGB BLD-MCNC: 10.6 G/DL (ref 12–16)
HGB UR QL STRIP: NEGATIVE
IMM GRANULOCYTES # BLD AUTO: 0.06 K/UL (ref 0–0.04)
IMM GRANULOCYTES NFR BLD AUTO: 0.4 % (ref 0–0.5)
KETONES UR QL STRIP: ABNORMAL
LACTATE SERPL-SCNC: 1.5 MMOL/L (ref 0.5–2.2)
LACTATE SERPL-SCNC: 3.1 MMOL/L (ref 0.5–2.2)
LEUKOCYTE ESTERASE UR QL STRIP: NEGATIVE
LYMPHOCYTES # BLD AUTO: 0.9 K/UL (ref 1–4.8)
LYMPHOCYTES NFR BLD: 6 % (ref 18–48)
MAGNESIUM SERPL-MCNC: 1.2 MG/DL (ref 1.6–2.6)
MCH RBC QN AUTO: 26.9 PG (ref 27–31)
MCHC RBC AUTO-ENTMCNC: 33.3 G/DL (ref 32–36)
MCV RBC AUTO: 81 FL (ref 82–98)
METHADONE UR QL SCN>300 NG/ML: NEGATIVE
MONOCYTES # BLD AUTO: 1 K/UL (ref 0.3–1)
MONOCYTES NFR BLD: 6.4 % (ref 4–15)
NEUTROPHILS # BLD AUTO: 13.5 K/UL (ref 1.8–7.7)
NEUTROPHILS NFR BLD: 87 % (ref 38–73)
NITRITE UR QL STRIP: NEGATIVE
NRBC BLD-RTO: 0 /100 WBC
OPIATES UR QL SCN: NEGATIVE
PCP UR QL SCN>25 NG/ML: NEGATIVE
PH UR STRIP: 5 [PH] (ref 5–8)
PHOSPHATE SERPL-MCNC: 4.4 MG/DL (ref 2.7–4.5)
PLATELET # BLD AUTO: 292 K/UL (ref 150–450)
PMV BLD AUTO: 10.7 FL (ref 9.2–12.9)
POC IONIZED CALCIUM: 1.27 MMOL/L (ref 1.06–1.42)
POC TCO2 (MEASURED): 24 MMOL/L (ref 23–29)
POTASSIUM BLD-SCNC: 3.8 MMOL/L (ref 3.5–5.1)
POTASSIUM SERPL-SCNC: 3.9 MMOL/L (ref 3.5–5.1)
PROCALCITONIN SERPL IA-MCNC: 0.14 NG/ML
PROT SERPL-MCNC: 6.5 G/DL (ref 6–8.4)
PROT UR QL STRIP: NEGATIVE
RBC # BLD AUTO: 3.94 M/UL (ref 4–5.4)
SAMPLE: ABNORMAL
SARS-COV-2 RDRP RESP QL NAA+PROBE: NEGATIVE
SODIUM BLD-SCNC: 136 MMOL/L (ref 136–145)
SODIUM SERPL-SCNC: 138 MMOL/L (ref 136–145)
SP GR UR STRIP: 1.02 (ref 1–1.03)
T3FREE SERPL-MCNC: 6.2 PG/ML (ref 2.3–4.2)
T4 FREE SERPL-MCNC: 3.06 NG/DL (ref 0.71–1.51)
TOXICOLOGY INFORMATION: ABNORMAL
TROPONIN I SERPL DL<=0.01 NG/ML-MCNC: 0.02 NG/ML (ref 0–0.03)
TSH SERPL DL<=0.005 MIU/L-ACNC: <0.01 UIU/ML (ref 0.4–4)
URN SPEC COLLECT METH UR: ABNORMAL
WBC # BLD AUTO: 15.55 K/UL (ref 3.9–12.7)

## 2021-08-08 PROCEDURE — 84100 ASSAY OF PHOSPHORUS: CPT | Performed by: EMERGENCY MEDICINE

## 2021-08-08 PROCEDURE — 85025 COMPLETE CBC W/AUTO DIFF WBC: CPT | Performed by: EMERGENCY MEDICINE

## 2021-08-08 PROCEDURE — 63600175 PHARM REV CODE 636 W HCPCS: Performed by: STUDENT IN AN ORGANIZED HEALTH CARE EDUCATION/TRAINING PROGRAM

## 2021-08-08 PROCEDURE — 96375 TX/PRO/DX INJ NEW DRUG ADDON: CPT

## 2021-08-08 PROCEDURE — 99285 EMERGENCY DEPT VISIT HI MDM: CPT | Mod: 25

## 2021-08-08 PROCEDURE — 84443 ASSAY THYROID STIM HORMONE: CPT | Performed by: EMERGENCY MEDICINE

## 2021-08-08 PROCEDURE — 80053 COMPREHEN METABOLIC PANEL: CPT | Performed by: EMERGENCY MEDICINE

## 2021-08-08 PROCEDURE — 11000001 HC ACUTE MED/SURG PRIVATE ROOM

## 2021-08-08 PROCEDURE — 83605 ASSAY OF LACTIC ACID: CPT | Mod: 91 | Performed by: STUDENT IN AN ORGANIZED HEALTH CARE EDUCATION/TRAINING PROGRAM

## 2021-08-08 PROCEDURE — 93010 EKG 12-LEAD: ICD-10-PCS | Mod: ,,, | Performed by: INTERNAL MEDICINE

## 2021-08-08 PROCEDURE — U0002 COVID-19 LAB TEST NON-CDC: HCPCS | Performed by: EMERGENCY MEDICINE

## 2021-08-08 PROCEDURE — 84439 ASSAY OF FREE THYROXINE: CPT | Performed by: EMERGENCY MEDICINE

## 2021-08-08 PROCEDURE — 93005 ELECTROCARDIOGRAM TRACING: CPT

## 2021-08-08 PROCEDURE — 84484 ASSAY OF TROPONIN QUANT: CPT | Performed by: EMERGENCY MEDICINE

## 2021-08-08 PROCEDURE — 99285 PR EMERGENCY DEPT VISIT,LEVEL V: ICD-10-PCS | Mod: CS,,, | Performed by: EMERGENCY MEDICINE

## 2021-08-08 PROCEDURE — 25000003 PHARM REV CODE 250: Performed by: HOSPITALIST

## 2021-08-08 PROCEDURE — 80047 BASIC METABLC PNL IONIZED CA: CPT

## 2021-08-08 PROCEDURE — 36415 COLL VENOUS BLD VENIPUNCTURE: CPT | Performed by: STUDENT IN AN ORGANIZED HEALTH CARE EDUCATION/TRAINING PROGRAM

## 2021-08-08 PROCEDURE — 81003 URINALYSIS AUTO W/O SCOPE: CPT | Performed by: EMERGENCY MEDICINE

## 2021-08-08 PROCEDURE — 80307 DRUG TEST PRSMV CHEM ANLYZR: CPT | Performed by: EMERGENCY MEDICINE

## 2021-08-08 PROCEDURE — 84145 PROCALCITONIN (PCT): CPT | Performed by: EMERGENCY MEDICINE

## 2021-08-08 PROCEDURE — 25000003 PHARM REV CODE 250: Performed by: STUDENT IN AN ORGANIZED HEALTH CARE EDUCATION/TRAINING PROGRAM

## 2021-08-08 PROCEDURE — 96365 THER/PROPH/DIAG IV INF INIT: CPT

## 2021-08-08 PROCEDURE — 83735 ASSAY OF MAGNESIUM: CPT | Performed by: EMERGENCY MEDICINE

## 2021-08-08 PROCEDURE — 87040 BLOOD CULTURE FOR BACTERIA: CPT | Mod: 59 | Performed by: EMERGENCY MEDICINE

## 2021-08-08 PROCEDURE — 93010 ELECTROCARDIOGRAM REPORT: CPT | Mod: ,,, | Performed by: INTERNAL MEDICINE

## 2021-08-08 PROCEDURE — 99285 EMERGENCY DEPT VISIT HI MDM: CPT | Mod: CS,,, | Performed by: EMERGENCY MEDICINE

## 2021-08-08 PROCEDURE — 84481 FREE ASSAY (FT-3): CPT | Performed by: STUDENT IN AN ORGANIZED HEALTH CARE EDUCATION/TRAINING PROGRAM

## 2021-08-08 PROCEDURE — 83605 ASSAY OF LACTIC ACID: CPT | Performed by: EMERGENCY MEDICINE

## 2021-08-08 PROCEDURE — 96374 THER/PROPH/DIAG INJ IV PUSH: CPT | Mod: 59

## 2021-08-08 PROCEDURE — 27000207 HC ISOLATION

## 2021-08-08 PROCEDURE — 63600175 PHARM REV CODE 636 W HCPCS: Performed by: EMERGENCY MEDICINE

## 2021-08-08 RX ORDER — PANTOPRAZOLE SODIUM 40 MG/1
40 TABLET, DELAYED RELEASE ORAL DAILY
Status: DISCONTINUED | OUTPATIENT
Start: 2021-08-08 | End: 2021-08-12

## 2021-08-08 RX ORDER — SODIUM CHLORIDE 9 MG/ML
INJECTION, SOLUTION INTRAVENOUS CONTINUOUS
Status: ACTIVE | OUTPATIENT
Start: 2021-08-08 | End: 2021-08-09

## 2021-08-08 RX ORDER — MAGNESIUM SULFATE HEPTAHYDRATE 40 MG/ML
2 INJECTION, SOLUTION INTRAVENOUS
Status: COMPLETED | OUTPATIENT
Start: 2021-08-08 | End: 2021-08-08

## 2021-08-08 RX ORDER — SODIUM CHLORIDE 0.9 % (FLUSH) 0.9 %
10 SYRINGE (ML) INJECTION
Status: DISCONTINUED | OUTPATIENT
Start: 2021-08-08 | End: 2021-08-19 | Stop reason: HOSPADM

## 2021-08-08 RX ORDER — IBUPROFEN 200 MG
16 TABLET ORAL
Status: DISCONTINUED | OUTPATIENT
Start: 2021-08-08 | End: 2021-08-19 | Stop reason: HOSPADM

## 2021-08-08 RX ORDER — INSULIN ASPART 100 [IU]/ML
0-5 INJECTION, SOLUTION INTRAVENOUS; SUBCUTANEOUS
Status: DISCONTINUED | OUTPATIENT
Start: 2021-08-08 | End: 2021-08-19 | Stop reason: HOSPADM

## 2021-08-08 RX ORDER — AMITRIPTYLINE HYDROCHLORIDE 50 MG/1
150 TABLET, FILM COATED ORAL NIGHTLY
Status: DISCONTINUED | OUTPATIENT
Start: 2021-08-08 | End: 2021-08-19 | Stop reason: HOSPADM

## 2021-08-08 RX ORDER — ENOXAPARIN SODIUM 100 MG/ML
40 INJECTION SUBCUTANEOUS EVERY 24 HOURS
Status: DISCONTINUED | OUTPATIENT
Start: 2021-08-08 | End: 2021-08-19 | Stop reason: HOSPADM

## 2021-08-08 RX ORDER — PROPRANOLOL HYDROCHLORIDE 10 MG/1
40 TABLET ORAL EVERY 8 HOURS
Status: DISCONTINUED | OUTPATIENT
Start: 2021-08-08 | End: 2021-08-10

## 2021-08-08 RX ORDER — VANCOMYCIN HCL IN 5 % DEXTROSE 1G/250ML
15 PLASTIC BAG, INJECTION (ML) INTRAVENOUS
Status: DISCONTINUED | OUTPATIENT
Start: 2021-08-08 | End: 2021-08-10

## 2021-08-08 RX ORDER — METHIMAZOLE 5 MG/1
30 TABLET ORAL ONCE
Status: COMPLETED | OUTPATIENT
Start: 2021-08-08 | End: 2021-08-08

## 2021-08-08 RX ORDER — METHIMAZOLE 5 MG/1
20 TABLET ORAL ONCE
Status: COMPLETED | OUTPATIENT
Start: 2021-08-09 | End: 2021-08-09

## 2021-08-08 RX ORDER — ONDANSETRON 8 MG/1
8 TABLET, ORALLY DISINTEGRATING ORAL EVERY 8 HOURS PRN
Status: DISCONTINUED | OUTPATIENT
Start: 2021-08-08 | End: 2021-08-19 | Stop reason: HOSPADM

## 2021-08-08 RX ORDER — PAROXETINE 10 MG/1
40 TABLET, FILM COATED ORAL DAILY
Status: DISCONTINUED | OUTPATIENT
Start: 2021-08-08 | End: 2021-08-19 | Stop reason: HOSPADM

## 2021-08-08 RX ORDER — ATORVASTATIN CALCIUM 20 MG/1
40 TABLET, FILM COATED ORAL DAILY
Status: DISCONTINUED | OUTPATIENT
Start: 2021-08-08 | End: 2021-08-19 | Stop reason: HOSPADM

## 2021-08-08 RX ORDER — GLUCAGON 1 MG
1 KIT INJECTION
Status: DISCONTINUED | OUTPATIENT
Start: 2021-08-08 | End: 2021-08-19 | Stop reason: HOSPADM

## 2021-08-08 RX ORDER — IBUPROFEN 200 MG
24 TABLET ORAL
Status: DISCONTINUED | OUTPATIENT
Start: 2021-08-08 | End: 2021-08-19 | Stop reason: HOSPADM

## 2021-08-08 RX ORDER — SODIUM CHLORIDE 9 MG/ML
INJECTION, SOLUTION INTRAVENOUS CONTINUOUS
Status: DISCONTINUED | OUTPATIENT
Start: 2021-08-08 | End: 2021-08-08

## 2021-08-08 RX ADMIN — MAGNESIUM SULFATE 2 G: 2 INJECTION INTRAVENOUS at 11:08

## 2021-08-08 RX ADMIN — SODIUM CHLORIDE: 0.9 INJECTION, SOLUTION INTRAVENOUS at 08:08

## 2021-08-08 RX ADMIN — SODIUM CHLORIDE, SODIUM LACTATE, POTASSIUM CHLORIDE, AND CALCIUM CHLORIDE 1000 ML: .6; .31; .03; .02 INJECTION, SOLUTION INTRAVENOUS at 05:08

## 2021-08-08 RX ADMIN — PIPERACILLIN SODIUM AND TAZOBACTAM SODIUM 4.5 G: 4; .5 INJECTION, POWDER, FOR SOLUTION INTRAVENOUS at 10:08

## 2021-08-08 RX ADMIN — METHIMAZOLE 30 MG: 10 TABLET ORAL at 08:08

## 2021-08-08 RX ADMIN — PAROXETINE 40 MG: 10 TABLET, FILM COATED ORAL at 05:08

## 2021-08-08 RX ADMIN — PIPERACILLIN SODIUM AND TAZOBACTAM SODIUM 4.5 G: 4; .5 INJECTION, POWDER, FOR SOLUTION INTRAVENOUS at 05:08

## 2021-08-08 RX ADMIN — PANTOPRAZOLE SODIUM 40 MG: 40 TABLET, DELAYED RELEASE ORAL at 05:08

## 2021-08-08 RX ADMIN — ENOXAPARIN SODIUM 40 MG: 40 INJECTION SUBCUTANEOUS at 05:08

## 2021-08-08 RX ADMIN — AMITRIPTYLINE HYDROCHLORIDE 150 MG: 50 TABLET, FILM COATED ORAL at 09:08

## 2021-08-08 RX ADMIN — VANCOMYCIN HYDROCHLORIDE 1750 MG: 10 INJECTION, POWDER, LYOPHILIZED, FOR SOLUTION INTRAVENOUS at 11:08

## 2021-08-08 RX ADMIN — PROPRANOLOL HYDROCHLORIDE 40 MG: 10 TABLET ORAL at 09:08

## 2021-08-08 RX ADMIN — SODIUM CHLORIDE, SODIUM LACTATE, POTASSIUM CHLORIDE, AND CALCIUM CHLORIDE 1974 ML: .6; .31; .03; .02 INJECTION, SOLUTION INTRAVENOUS at 10:08

## 2021-08-08 RX ADMIN — ATORVASTATIN CALCIUM 40 MG: 20 TABLET, FILM COATED ORAL at 05:08

## 2021-08-09 PROBLEM — E05.90 HYPERTHYROIDISM: Status: ACTIVE | Noted: 2021-08-09

## 2021-08-09 PROBLEM — A04.71 RECURRENT CLOSTRIDIOIDES DIFFICILE DIARRHEA: Status: ACTIVE | Noted: 2021-06-23

## 2021-08-09 PROBLEM — R94.6 ABNORMAL THYROID FUNCTION TEST: Status: RESOLVED | Noted: 2021-08-08 | Resolved: 2021-08-09

## 2021-08-09 LAB
ALBUMIN SERPL BCP-MCNC: 2.1 G/DL (ref 3.5–5.2)
ALP SERPL-CCNC: 121 U/L (ref 55–135)
ALT SERPL W/O P-5'-P-CCNC: 21 U/L (ref 10–44)
ANION GAP SERPL CALC-SCNC: 12 MMOL/L (ref 8–16)
ANISOCYTOSIS BLD QL SMEAR: SLIGHT
AST SERPL-CCNC: 27 U/L (ref 10–40)
BASOPHILS # BLD AUTO: 0.04 K/UL (ref 0–0.2)
BASOPHILS NFR BLD: 0.2 % (ref 0–1.9)
BILIRUB SERPL-MCNC: 1.1 MG/DL (ref 0.1–1)
BUN SERPL-MCNC: 21 MG/DL (ref 6–20)
BURR CELLS BLD QL SMEAR: ABNORMAL
C DIFF GDH STL QL: POSITIVE
C DIFF TOX A+B STL QL IA: POSITIVE
CALCIUM SERPL-MCNC: 9.1 MG/DL (ref 8.7–10.5)
CHLORIDE SERPL-SCNC: 99 MMOL/L (ref 95–110)
CO2 SERPL-SCNC: 24 MMOL/L (ref 23–29)
CREAT SERPL-MCNC: 0.8 MG/DL (ref 0.5–1.4)
DIFFERENTIAL METHOD: ABNORMAL
EOSINOPHIL # BLD AUTO: 0 K/UL (ref 0–0.5)
EOSINOPHIL NFR BLD: 0.1 % (ref 0–8)
ERYTHROCYTE [DISTWIDTH] IN BLOOD BY AUTOMATED COUNT: 13.2 % (ref 11.5–14.5)
EST. GFR  (AFRICAN AMERICAN): >60 ML/MIN/1.73 M^2
EST. GFR  (NON AFRICAN AMERICAN): >60 ML/MIN/1.73 M^2
GLUCOSE SERPL-MCNC: 130 MG/DL (ref 70–110)
HCT VFR BLD AUTO: 30.8 % (ref 37–48.5)
HGB BLD-MCNC: 10.2 G/DL (ref 12–16)
HYPOCHROMIA BLD QL SMEAR: ABNORMAL
IMM GRANULOCYTES # BLD AUTO: 0.08 K/UL (ref 0–0.04)
IMM GRANULOCYTES NFR BLD AUTO: 0.5 % (ref 0–0.5)
LYMPHOCYTES # BLD AUTO: 1 K/UL (ref 1–4.8)
LYMPHOCYTES NFR BLD: 5.5 % (ref 18–48)
MAGNESIUM SERPL-MCNC: 1.6 MG/DL (ref 1.6–2.6)
MCH RBC QN AUTO: 26.6 PG (ref 27–31)
MCHC RBC AUTO-ENTMCNC: 33.1 G/DL (ref 32–36)
MCV RBC AUTO: 80 FL (ref 82–98)
MONOCYTES # BLD AUTO: 1.2 K/UL (ref 0.3–1)
MONOCYTES NFR BLD: 7.2 % (ref 4–15)
NEUTROPHILS # BLD AUTO: 14.8 K/UL (ref 1.8–7.7)
NEUTROPHILS NFR BLD: 86.5 % (ref 38–73)
NRBC BLD-RTO: 0 /100 WBC
OVALOCYTES BLD QL SMEAR: ABNORMAL
PHOSPHATE SERPL-MCNC: 5.7 MG/DL (ref 2.7–4.5)
PLATELET # BLD AUTO: 263 K/UL (ref 150–450)
PMV BLD AUTO: 11.5 FL (ref 9.2–12.9)
POCT GLUCOSE: 109 MG/DL (ref 70–110)
POCT GLUCOSE: 118 MG/DL (ref 70–110)
POCT GLUCOSE: 120 MG/DL (ref 70–110)
POCT GLUCOSE: 127 MG/DL (ref 70–110)
POIKILOCYTOSIS BLD QL SMEAR: SLIGHT
POLYCHROMASIA BLD QL SMEAR: ABNORMAL
POTASSIUM SERPL-SCNC: 3.6 MMOL/L (ref 3.5–5.1)
PROT SERPL-MCNC: 4.9 G/DL (ref 6–8.4)
RBC # BLD AUTO: 3.83 M/UL (ref 4–5.4)
SODIUM SERPL-SCNC: 135 MMOL/L (ref 136–145)
WBC # BLD AUTO: 17.13 K/UL (ref 3.9–12.7)

## 2021-08-09 PROCEDURE — 36415 COLL VENOUS BLD VENIPUNCTURE: CPT | Performed by: STUDENT IN AN ORGANIZED HEALTH CARE EDUCATION/TRAINING PROGRAM

## 2021-08-09 PROCEDURE — 84100 ASSAY OF PHOSPHORUS: CPT | Performed by: STUDENT IN AN ORGANIZED HEALTH CARE EDUCATION/TRAINING PROGRAM

## 2021-08-09 PROCEDURE — 25000003 PHARM REV CODE 250: Performed by: STUDENT IN AN ORGANIZED HEALTH CARE EDUCATION/TRAINING PROGRAM

## 2021-08-09 PROCEDURE — 99233 SBSQ HOSP IP/OBS HIGH 50: CPT | Mod: ,,, | Performed by: HOSPITALIST

## 2021-08-09 PROCEDURE — 99223 PR INITIAL HOSPITAL CARE,LEVL III: ICD-10-PCS | Mod: ,,, | Performed by: INTERNAL MEDICINE

## 2021-08-09 PROCEDURE — 25000003 PHARM REV CODE 250: Performed by: PHYSICIAN ASSISTANT

## 2021-08-09 PROCEDURE — 80202 ASSAY OF VANCOMYCIN: CPT | Performed by: HOSPITALIST

## 2021-08-09 PROCEDURE — 99223 1ST HOSP IP/OBS HIGH 75: CPT | Mod: ,,, | Performed by: INTERNAL MEDICINE

## 2021-08-09 PROCEDURE — 27000207 HC ISOLATION

## 2021-08-09 PROCEDURE — 87046 STOOL CULTR AEROBIC BACT EA: CPT | Mod: 59 | Performed by: STUDENT IN AN ORGANIZED HEALTH CARE EDUCATION/TRAINING PROGRAM

## 2021-08-09 PROCEDURE — 83520 IMMUNOASSAY QUANT NOS NONAB: CPT | Performed by: EMERGENCY MEDICINE

## 2021-08-09 PROCEDURE — 63600175 PHARM REV CODE 636 W HCPCS: Performed by: STUDENT IN AN ORGANIZED HEALTH CARE EDUCATION/TRAINING PROGRAM

## 2021-08-09 PROCEDURE — 25000003 PHARM REV CODE 250: Performed by: HOSPITALIST

## 2021-08-09 PROCEDURE — 87209 SMEAR COMPLEX STAIN: CPT | Performed by: STUDENT IN AN ORGANIZED HEALTH CARE EDUCATION/TRAINING PROGRAM

## 2021-08-09 PROCEDURE — 83735 ASSAY OF MAGNESIUM: CPT | Performed by: STUDENT IN AN ORGANIZED HEALTH CARE EDUCATION/TRAINING PROGRAM

## 2021-08-09 PROCEDURE — 87324 CLOSTRIDIUM AG IA: CPT | Performed by: STUDENT IN AN ORGANIZED HEALTH CARE EDUCATION/TRAINING PROGRAM

## 2021-08-09 PROCEDURE — 80053 COMPREHEN METABOLIC PANEL: CPT | Performed by: STUDENT IN AN ORGANIZED HEALTH CARE EDUCATION/TRAINING PROGRAM

## 2021-08-09 PROCEDURE — 63600175 PHARM REV CODE 636 W HCPCS: Performed by: HOSPITALIST

## 2021-08-09 PROCEDURE — 87427 SHIGA-LIKE TOXIN AG IA: CPT | Mod: 59 | Performed by: STUDENT IN AN ORGANIZED HEALTH CARE EDUCATION/TRAINING PROGRAM

## 2021-08-09 PROCEDURE — 87045 FECES CULTURE AEROBIC BACT: CPT | Performed by: STUDENT IN AN ORGANIZED HEALTH CARE EDUCATION/TRAINING PROGRAM

## 2021-08-09 PROCEDURE — 85025 COMPLETE CBC W/AUTO DIFF WBC: CPT | Performed by: STUDENT IN AN ORGANIZED HEALTH CARE EDUCATION/TRAINING PROGRAM

## 2021-08-09 PROCEDURE — 89055 LEUKOCYTE ASSESSMENT FECAL: CPT | Performed by: STUDENT IN AN ORGANIZED HEALTH CARE EDUCATION/TRAINING PROGRAM

## 2021-08-09 PROCEDURE — 99233 PR SUBSEQUENT HOSPITAL CARE,LEVL III: ICD-10-PCS | Mod: ,,, | Performed by: HOSPITALIST

## 2021-08-09 PROCEDURE — 36415 COLL VENOUS BLD VENIPUNCTURE: CPT | Performed by: HOSPITALIST

## 2021-08-09 PROCEDURE — 87449 NOS EACH ORGANISM AG IA: CPT | Performed by: STUDENT IN AN ORGANIZED HEALTH CARE EDUCATION/TRAINING PROGRAM

## 2021-08-09 PROCEDURE — 11000001 HC ACUTE MED/SURG PRIVATE ROOM

## 2021-08-09 RX ORDER — CHOLECALCIFEROL (VITAMIN D3) 25 MCG
2000 TABLET ORAL DAILY
Status: DISCONTINUED | OUTPATIENT
Start: 2021-08-12 | End: 2021-08-19 | Stop reason: HOSPADM

## 2021-08-09 RX ORDER — ERGOCALCIFEROL 1.25 MG/1
50000 CAPSULE ORAL DAILY
Status: DISPENSED | OUTPATIENT
Start: 2021-08-09 | End: 2021-08-12

## 2021-08-09 RX ORDER — ERGOCALCIFEROL 1.25 MG/1
50000 CAPSULE ORAL
Status: DISCONTINUED | OUTPATIENT
Start: 2021-08-09 | End: 2021-08-09

## 2021-08-09 RX ORDER — METHIMAZOLE 5 MG/1
20 TABLET ORAL DAILY
Status: DISCONTINUED | OUTPATIENT
Start: 2021-08-10 | End: 2021-08-09

## 2021-08-09 RX ORDER — METHIMAZOLE 5 MG/1
20 TABLET ORAL 2 TIMES DAILY
Status: DISCONTINUED | OUTPATIENT
Start: 2021-08-09 | End: 2021-08-11

## 2021-08-09 RX ADMIN — SODIUM CHLORIDE 1000 ML: 0.9 INJECTION, SOLUTION INTRAVENOUS at 07:08

## 2021-08-09 RX ADMIN — ERGOCALCIFEROL 50000 UNITS: 1.25 CAPSULE ORAL at 12:08

## 2021-08-09 RX ADMIN — VANCOMYCIN HYDROCHLORIDE 125 MG: KIT at 05:08

## 2021-08-09 RX ADMIN — VANCOMYCIN HYDROCHLORIDE 1000 MG: 1 INJECTION, POWDER, LYOPHILIZED, FOR SOLUTION INTRAVENOUS at 12:08

## 2021-08-09 RX ADMIN — ATORVASTATIN CALCIUM 40 MG: 20 TABLET, FILM COATED ORAL at 08:08

## 2021-08-09 RX ADMIN — PIPERACILLIN SODIUM AND TAZOBACTAM SODIUM 4.5 G: 4; .5 INJECTION, POWDER, FOR SOLUTION INTRAVENOUS at 05:08

## 2021-08-09 RX ADMIN — PIPERACILLIN SODIUM AND TAZOBACTAM SODIUM 4.5 G: 4; .5 INJECTION, POWDER, FOR SOLUTION INTRAVENOUS at 09:08

## 2021-08-09 RX ADMIN — AMITRIPTYLINE HYDROCHLORIDE 150 MG: 50 TABLET, FILM COATED ORAL at 09:08

## 2021-08-09 RX ADMIN — PROPRANOLOL HYDROCHLORIDE 40 MG: 10 TABLET ORAL at 02:08

## 2021-08-09 RX ADMIN — PROPRANOLOL HYDROCHLORIDE 40 MG: 10 TABLET ORAL at 05:08

## 2021-08-09 RX ADMIN — ENOXAPARIN SODIUM 40 MG: 40 INJECTION SUBCUTANEOUS at 05:08

## 2021-08-09 RX ADMIN — PANTOPRAZOLE SODIUM 40 MG: 40 TABLET, DELAYED RELEASE ORAL at 08:08

## 2021-08-09 RX ADMIN — METHIMAZOLE 20 MG: 5 TABLET ORAL at 09:08

## 2021-08-09 RX ADMIN — PIPERACILLIN SODIUM AND TAZOBACTAM SODIUM 4.5 G: 4; .5 INJECTION, POWDER, FOR SOLUTION INTRAVENOUS at 02:08

## 2021-08-09 RX ADMIN — SODIUM CHLORIDE: 0.9 INJECTION, SOLUTION INTRAVENOUS at 07:08

## 2021-08-09 RX ADMIN — PAROXETINE 40 MG: 10 TABLET, FILM COATED ORAL at 08:08

## 2021-08-09 RX ADMIN — METHIMAZOLE 20 MG: 5 TABLET ORAL at 08:08

## 2021-08-10 PROBLEM — E05.00 GRAVES DISEASE: Status: ACTIVE | Noted: 2021-08-09

## 2021-08-10 LAB
ALBUMIN SERPL BCP-MCNC: 1.9 G/DL (ref 3.5–5.2)
ALBUMIN SERPL BCP-MCNC: 1.9 G/DL (ref 3.5–5.2)
ALP SERPL-CCNC: 106 U/L (ref 55–135)
ALP SERPL-CCNC: 96 U/L (ref 55–135)
ALT SERPL W/O P-5'-P-CCNC: 24 U/L (ref 10–44)
ALT SERPL W/O P-5'-P-CCNC: 26 U/L (ref 10–44)
ANION GAP SERPL CALC-SCNC: 12 MMOL/L (ref 8–16)
ANION GAP SERPL CALC-SCNC: 17 MMOL/L (ref 8–16)
ANION GAP SERPL CALC-SCNC: 7 MMOL/L (ref 8–16)
ANISOCYTOSIS BLD QL SMEAR: SLIGHT
ANISOCYTOSIS BLD QL SMEAR: SLIGHT
AST SERPL-CCNC: 33 U/L (ref 10–40)
AST SERPL-CCNC: 40 U/L (ref 10–40)
BASOPHILS # BLD AUTO: 0.02 K/UL (ref 0–0.2)
BASOPHILS # BLD AUTO: 0.04 K/UL (ref 0–0.2)
BASOPHILS NFR BLD: 0.1 % (ref 0–1.9)
BASOPHILS NFR BLD: 0.3 % (ref 0–1.9)
BILIRUB SERPL-MCNC: 0.4 MG/DL (ref 0.1–1)
BILIRUB SERPL-MCNC: 0.5 MG/DL (ref 0.1–1)
BUN SERPL-MCNC: 21 MG/DL (ref 6–20)
BUN SERPL-MCNC: 25 MG/DL (ref 6–20)
BUN SERPL-MCNC: 27 MG/DL (ref 6–20)
CALCIUM SERPL-MCNC: 8.1 MG/DL (ref 8.7–10.5)
CALCIUM SERPL-MCNC: 8.4 MG/DL (ref 8.7–10.5)
CALCIUM SERPL-MCNC: 8.5 MG/DL (ref 8.7–10.5)
CHLORIDE SERPL-SCNC: 102 MMOL/L (ref 95–110)
CHLORIDE SERPL-SCNC: 105 MMOL/L (ref 95–110)
CHLORIDE SERPL-SCNC: 105 MMOL/L (ref 95–110)
CO2 SERPL-SCNC: 13 MMOL/L (ref 23–29)
CO2 SERPL-SCNC: 18 MMOL/L (ref 23–29)
CO2 SERPL-SCNC: 21 MMOL/L (ref 23–29)
CREAT SERPL-MCNC: 0.6 MG/DL (ref 0.5–1.4)
CREAT SERPL-MCNC: 0.8 MG/DL (ref 0.5–1.4)
CREAT SERPL-MCNC: 0.8 MG/DL (ref 0.5–1.4)
DIFFERENTIAL METHOD: ABNORMAL
DIFFERENTIAL METHOD: ABNORMAL
E COLI SXT1 STL QL IA: NEGATIVE
E COLI SXT2 STL QL IA: NEGATIVE
EOSINOPHIL # BLD AUTO: 0.1 K/UL (ref 0–0.5)
EOSINOPHIL # BLD AUTO: 0.1 K/UL (ref 0–0.5)
EOSINOPHIL NFR BLD: 0.4 % (ref 0–8)
EOSINOPHIL NFR BLD: 0.7 % (ref 0–8)
ERYTHROCYTE [DISTWIDTH] IN BLOOD BY AUTOMATED COUNT: 13.3 % (ref 11.5–14.5)
ERYTHROCYTE [DISTWIDTH] IN BLOOD BY AUTOMATED COUNT: 13.4 % (ref 11.5–14.5)
EST. GFR  (AFRICAN AMERICAN): >60 ML/MIN/1.73 M^2
EST. GFR  (NON AFRICAN AMERICAN): >60 ML/MIN/1.73 M^2
GLUCOSE SERPL-MCNC: 75 MG/DL (ref 70–110)
GLUCOSE SERPL-MCNC: 77 MG/DL (ref 70–110)
GLUCOSE SERPL-MCNC: 87 MG/DL (ref 70–110)
HCT VFR BLD AUTO: 25.8 % (ref 37–48.5)
HCT VFR BLD AUTO: 26.4 % (ref 37–48.5)
HGB BLD-MCNC: 8.2 G/DL (ref 12–16)
HGB BLD-MCNC: 8.6 G/DL (ref 12–16)
HIV 1+2 AB+HIV1 P24 AG SERPL QL IA: NEGATIVE
HYPOCHROMIA BLD QL SMEAR: ABNORMAL
IMM GRANULOCYTES # BLD AUTO: 0.1 K/UL (ref 0–0.04)
IMM GRANULOCYTES # BLD AUTO: 0.14 K/UL (ref 0–0.04)
IMM GRANULOCYTES NFR BLD AUTO: 0.7 % (ref 0–0.5)
IMM GRANULOCYTES NFR BLD AUTO: 1 % (ref 0–0.5)
LACTATE SERPL-SCNC: 1.2 MMOL/L (ref 0.5–2.2)
LYMPHOCYTES # BLD AUTO: 1.4 K/UL (ref 1–4.8)
LYMPHOCYTES # BLD AUTO: 1.5 K/UL (ref 1–4.8)
LYMPHOCYTES NFR BLD: 10.5 % (ref 18–48)
LYMPHOCYTES NFR BLD: 9.6 % (ref 18–48)
MAGNESIUM SERPL-MCNC: 1.7 MG/DL (ref 1.6–2.6)
MCH RBC QN AUTO: 26.6 PG (ref 27–31)
MCH RBC QN AUTO: 26.9 PG (ref 27–31)
MCHC RBC AUTO-ENTMCNC: 31.8 G/DL (ref 32–36)
MCHC RBC AUTO-ENTMCNC: 32.6 G/DL (ref 32–36)
MCV RBC AUTO: 82 FL (ref 82–98)
MCV RBC AUTO: 85 FL (ref 82–98)
MONOCYTES # BLD AUTO: 0.9 K/UL (ref 0.3–1)
MONOCYTES # BLD AUTO: 1 K/UL (ref 0.3–1)
MONOCYTES NFR BLD: 6.4 % (ref 4–15)
MONOCYTES NFR BLD: 7.2 % (ref 4–15)
NEUTROPHILS # BLD AUTO: 11.3 K/UL (ref 1.8–7.7)
NEUTROPHILS # BLD AUTO: 11.8 K/UL (ref 1.8–7.7)
NEUTROPHILS NFR BLD: 80.9 % (ref 38–73)
NEUTROPHILS NFR BLD: 82.2 % (ref 38–73)
NRBC BLD-RTO: 0 /100 WBC
NRBC BLD-RTO: 0 /100 WBC
OVALOCYTES BLD QL SMEAR: ABNORMAL
OVALOCYTES BLD QL SMEAR: ABNORMAL
PHOSPHATE SERPL-MCNC: 3.9 MG/DL (ref 2.7–4.5)
PLATELET # BLD AUTO: 226 K/UL (ref 150–450)
PLATELET # BLD AUTO: 262 K/UL (ref 150–450)
PLATELET BLD QL SMEAR: ABNORMAL
PLATELET BLD QL SMEAR: ABNORMAL
PMV BLD AUTO: 10.7 FL (ref 9.2–12.9)
PMV BLD AUTO: 10.8 FL (ref 9.2–12.9)
POIKILOCYTOSIS BLD QL SMEAR: SLIGHT
POIKILOCYTOSIS BLD QL SMEAR: SLIGHT
POTASSIUM SERPL-SCNC: 2.9 MMOL/L (ref 3.5–5.1)
POTASSIUM SERPL-SCNC: 3.5 MMOL/L (ref 3.5–5.1)
POTASSIUM SERPL-SCNC: 3.6 MMOL/L (ref 3.5–5.1)
PROT SERPL-MCNC: 4.5 G/DL (ref 6–8.4)
PROT SERPL-MCNC: 4.8 G/DL (ref 6–8.4)
RBC # BLD AUTO: 3.05 M/UL (ref 4–5.4)
RBC # BLD AUTO: 3.23 M/UL (ref 4–5.4)
SODIUM SERPL-SCNC: 132 MMOL/L (ref 136–145)
SODIUM SERPL-SCNC: 133 MMOL/L (ref 136–145)
SODIUM SERPL-SCNC: 135 MMOL/L (ref 136–145)
T4 FREE SERPL-MCNC: 1.96 NG/DL (ref 0.71–1.51)
THYROPEROXIDASE IGG SERPL-ACNC: <6 IU/ML
TSH RECEP AB SER-ACNC: 17 IU/L (ref 0–1.75)
VANCOMYCIN TROUGH SERPL-MCNC: 24.1 UG/ML (ref 10–22)
WBC # BLD AUTO: 13.97 K/UL (ref 3.9–12.7)
WBC # BLD AUTO: 14.37 K/UL (ref 3.9–12.7)
WBC #/AREA STL HPF: ABNORMAL /[HPF]

## 2021-08-10 PROCEDURE — 63600175 PHARM REV CODE 636 W HCPCS: Performed by: STUDENT IN AN ORGANIZED HEALTH CARE EDUCATION/TRAINING PROGRAM

## 2021-08-10 PROCEDURE — 36415 COLL VENOUS BLD VENIPUNCTURE: CPT | Performed by: STUDENT IN AN ORGANIZED HEALTH CARE EDUCATION/TRAINING PROGRAM

## 2021-08-10 PROCEDURE — 99233 SBSQ HOSP IP/OBS HIGH 50: CPT | Mod: ,,, | Performed by: HOSPITALIST

## 2021-08-10 PROCEDURE — 83735 ASSAY OF MAGNESIUM: CPT | Performed by: STUDENT IN AN ORGANIZED HEALTH CARE EDUCATION/TRAINING PROGRAM

## 2021-08-10 PROCEDURE — 27000207 HC ISOLATION

## 2021-08-10 PROCEDURE — 84439 ASSAY OF FREE THYROXINE: CPT | Performed by: STUDENT IN AN ORGANIZED HEALTH CARE EDUCATION/TRAINING PROGRAM

## 2021-08-10 PROCEDURE — 99232 SBSQ HOSP IP/OBS MODERATE 35: CPT | Mod: ,,, | Performed by: INTERNAL MEDICINE

## 2021-08-10 PROCEDURE — 87389 HIV-1 AG W/HIV-1&-2 AB AG IA: CPT | Performed by: STUDENT IN AN ORGANIZED HEALTH CARE EDUCATION/TRAINING PROGRAM

## 2021-08-10 PROCEDURE — 25000003 PHARM REV CODE 250: Performed by: STUDENT IN AN ORGANIZED HEALTH CARE EDUCATION/TRAINING PROGRAM

## 2021-08-10 PROCEDURE — 11000001 HC ACUTE MED/SURG PRIVATE ROOM

## 2021-08-10 PROCEDURE — 86376 MICROSOMAL ANTIBODY EACH: CPT | Performed by: STUDENT IN AN ORGANIZED HEALTH CARE EDUCATION/TRAINING PROGRAM

## 2021-08-10 PROCEDURE — 80053 COMPREHEN METABOLIC PANEL: CPT | Mod: 91 | Performed by: STUDENT IN AN ORGANIZED HEALTH CARE EDUCATION/TRAINING PROGRAM

## 2021-08-10 PROCEDURE — 83605 ASSAY OF LACTIC ACID: CPT | Performed by: PHYSICIAN ASSISTANT

## 2021-08-10 PROCEDURE — 80053 COMPREHEN METABOLIC PANEL: CPT | Performed by: STUDENT IN AN ORGANIZED HEALTH CARE EDUCATION/TRAINING PROGRAM

## 2021-08-10 PROCEDURE — 63600175 PHARM REV CODE 636 W HCPCS: Performed by: PHYSICIAN ASSISTANT

## 2021-08-10 PROCEDURE — 63600175 PHARM REV CODE 636 W HCPCS: Performed by: HOSPITALIST

## 2021-08-10 PROCEDURE — 99233 PR SUBSEQUENT HOSPITAL CARE,LEVL III: ICD-10-PCS | Mod: ,,, | Performed by: HOSPITALIST

## 2021-08-10 PROCEDURE — 80048 BASIC METABOLIC PNL TOTAL CA: CPT | Performed by: PHYSICIAN ASSISTANT

## 2021-08-10 PROCEDURE — 25000003 PHARM REV CODE 250: Performed by: PHYSICIAN ASSISTANT

## 2021-08-10 PROCEDURE — 85025 COMPLETE CBC W/AUTO DIFF WBC: CPT | Mod: 91 | Performed by: STUDENT IN AN ORGANIZED HEALTH CARE EDUCATION/TRAINING PROGRAM

## 2021-08-10 PROCEDURE — 84100 ASSAY OF PHOSPHORUS: CPT | Performed by: STUDENT IN AN ORGANIZED HEALTH CARE EDUCATION/TRAINING PROGRAM

## 2021-08-10 PROCEDURE — 25000003 PHARM REV CODE 250: Performed by: HOSPITALIST

## 2021-08-10 PROCEDURE — 36415 COLL VENOUS BLD VENIPUNCTURE: CPT | Performed by: PHYSICIAN ASSISTANT

## 2021-08-10 PROCEDURE — 99232 PR SUBSEQUENT HOSPITAL CARE,LEVL II: ICD-10-PCS | Mod: ,,, | Performed by: INTERNAL MEDICINE

## 2021-08-10 PROCEDURE — 85025 COMPLETE CBC W/AUTO DIFF WBC: CPT | Performed by: PHYSICIAN ASSISTANT

## 2021-08-10 RX ORDER — SODIUM CHLORIDE, SODIUM LACTATE, POTASSIUM CHLORIDE, CALCIUM CHLORIDE 600; 310; 30; 20 MG/100ML; MG/100ML; MG/100ML; MG/100ML
INJECTION, SOLUTION INTRAVENOUS CONTINUOUS
Status: DISCONTINUED | OUTPATIENT
Start: 2021-08-10 | End: 2021-08-10

## 2021-08-10 RX ORDER — SODIUM CHLORIDE, SODIUM LACTATE, POTASSIUM CHLORIDE, CALCIUM CHLORIDE 600; 310; 30; 20 MG/100ML; MG/100ML; MG/100ML; MG/100ML
INJECTION, SOLUTION INTRAVENOUS CONTINUOUS
Status: ACTIVE | OUTPATIENT
Start: 2021-08-10 | End: 2021-08-11

## 2021-08-10 RX ORDER — POTASSIUM CHLORIDE 7.45 MG/ML
20 INJECTION INTRAVENOUS ONCE
Status: DISCONTINUED | OUTPATIENT
Start: 2021-08-10 | End: 2021-08-10

## 2021-08-10 RX ORDER — POTASSIUM CHLORIDE 7.45 MG/ML
10 INJECTION INTRAVENOUS ONCE
Status: COMPLETED | OUTPATIENT
Start: 2021-08-10 | End: 2021-08-10

## 2021-08-10 RX ORDER — PROPRANOLOL HYDROCHLORIDE 10 MG/1
20 TABLET ORAL 3 TIMES DAILY
Status: DISCONTINUED | OUTPATIENT
Start: 2021-08-10 | End: 2021-08-19 | Stop reason: HOSPADM

## 2021-08-10 RX ADMIN — VANCOMYCIN HYDROCHLORIDE 125 MG: KIT at 09:08

## 2021-08-10 RX ADMIN — PIPERACILLIN SODIUM AND TAZOBACTAM SODIUM 4.5 G: 4; .5 INJECTION, POWDER, FOR SOLUTION INTRAVENOUS at 11:08

## 2021-08-10 RX ADMIN — SODIUM CHLORIDE, SODIUM LACTATE, POTASSIUM CHLORIDE, AND CALCIUM CHLORIDE: .6; .31; .03; .02 INJECTION, SOLUTION INTRAVENOUS at 02:08

## 2021-08-10 RX ADMIN — SODIUM CHLORIDE, SODIUM LACTATE, POTASSIUM CHLORIDE, AND CALCIUM CHLORIDE 1000 ML: .6; .31; .03; .02 INJECTION, SOLUTION INTRAVENOUS at 08:08

## 2021-08-10 RX ADMIN — PROPRANOLOL HYDROCHLORIDE 20 MG: 10 TABLET ORAL at 09:08

## 2021-08-10 RX ADMIN — SODIUM CHLORIDE 1000 ML: 0.9 INJECTION, SOLUTION INTRAVENOUS at 12:08

## 2021-08-10 RX ADMIN — PANTOPRAZOLE SODIUM 40 MG: 40 TABLET, DELAYED RELEASE ORAL at 08:08

## 2021-08-10 RX ADMIN — VANCOMYCIN HYDROCHLORIDE 750 MG: 750 INJECTION, POWDER, LYOPHILIZED, FOR SOLUTION INTRAVENOUS at 03:08

## 2021-08-10 RX ADMIN — PIPERACILLIN SODIUM AND TAZOBACTAM SODIUM 4.5 G: 4; .5 INJECTION, POWDER, FOR SOLUTION INTRAVENOUS at 02:08

## 2021-08-10 RX ADMIN — VANCOMYCIN HYDROCHLORIDE 125 MG: KIT at 03:08

## 2021-08-10 RX ADMIN — VANCOMYCIN HYDROCHLORIDE 125 MG: KIT at 12:08

## 2021-08-10 RX ADMIN — AMITRIPTYLINE HYDROCHLORIDE 150 MG: 50 TABLET, FILM COATED ORAL at 09:08

## 2021-08-10 RX ADMIN — PIPERACILLIN SODIUM AND TAZOBACTAM SODIUM 4.5 G: 4; .5 INJECTION, POWDER, FOR SOLUTION INTRAVENOUS at 06:08

## 2021-08-10 RX ADMIN — ERGOCALCIFEROL 50000 UNITS: 1.25 CAPSULE ORAL at 08:08

## 2021-08-10 RX ADMIN — PROPRANOLOL HYDROCHLORIDE 20 MG: 10 TABLET ORAL at 03:08

## 2021-08-10 RX ADMIN — VANCOMYCIN HYDROCHLORIDE 125 MG: KIT at 05:08

## 2021-08-10 RX ADMIN — VANCOMYCIN HYDROCHLORIDE 1000 MG: 1 INJECTION, POWDER, LYOPHILIZED, FOR SOLUTION INTRAVENOUS at 01:08

## 2021-08-10 RX ADMIN — METHIMAZOLE 20 MG: 5 TABLET ORAL at 09:08

## 2021-08-10 RX ADMIN — SODIUM CHLORIDE, SODIUM LACTATE, POTASSIUM CHLORIDE, AND CALCIUM CHLORIDE: 600; 310; 30; 20 INJECTION, SOLUTION INTRAVENOUS at 06:08

## 2021-08-10 RX ADMIN — METHIMAZOLE 20 MG: 5 TABLET ORAL at 08:08

## 2021-08-10 RX ADMIN — ENOXAPARIN SODIUM 40 MG: 40 INJECTION SUBCUTANEOUS at 05:08

## 2021-08-10 RX ADMIN — POTASSIUM CHLORIDE 10 MEQ: 7.46 INJECTION, SOLUTION INTRAVENOUS at 10:08

## 2021-08-10 RX ADMIN — PAROXETINE 40 MG: 10 TABLET, FILM COATED ORAL at 08:08

## 2021-08-10 RX ADMIN — POTASSIUM CHLORIDE 10 MEQ: 7.46 INJECTION, SOLUTION INTRAVENOUS at 08:08

## 2021-08-10 RX ADMIN — ATORVASTATIN CALCIUM 40 MG: 20 TABLET, FILM COATED ORAL at 08:08

## 2021-08-10 RX ADMIN — POTASSIUM BICARBONATE 30 MEQ: 391 TABLET, EFFERVESCENT ORAL at 08:08

## 2021-08-11 LAB
ALBUMIN SERPL BCP-MCNC: 1.8 G/DL (ref 3.5–5.2)
ALP SERPL-CCNC: 103 U/L (ref 55–135)
ALT SERPL W/O P-5'-P-CCNC: 27 U/L (ref 10–44)
ANION GAP SERPL CALC-SCNC: 10 MMOL/L (ref 8–16)
AST SERPL-CCNC: 36 U/L (ref 10–40)
BASOPHILS # BLD AUTO: 0.03 K/UL (ref 0–0.2)
BASOPHILS NFR BLD: 0.2 % (ref 0–1.9)
BILIRUB SERPL-MCNC: 0.4 MG/DL (ref 0.1–1)
BUN SERPL-MCNC: 15 MG/DL (ref 6–20)
CALCIUM SERPL-MCNC: 8.3 MG/DL (ref 8.7–10.5)
CHLORIDE SERPL-SCNC: 107 MMOL/L (ref 95–110)
CO2 SERPL-SCNC: 21 MMOL/L (ref 23–29)
CREAT SERPL-MCNC: 0.6 MG/DL (ref 0.5–1.4)
DIFFERENTIAL METHOD: ABNORMAL
EOSINOPHIL # BLD AUTO: 0.1 K/UL (ref 0–0.5)
EOSINOPHIL NFR BLD: 0.7 % (ref 0–8)
ERYTHROCYTE [DISTWIDTH] IN BLOOD BY AUTOMATED COUNT: 13.5 % (ref 11.5–14.5)
EST. GFR  (AFRICAN AMERICAN): >60 ML/MIN/1.73 M^2
EST. GFR  (NON AFRICAN AMERICAN): >60 ML/MIN/1.73 M^2
GLUCOSE SERPL-MCNC: 62 MG/DL (ref 70–110)
HCT VFR BLD AUTO: 24.7 % (ref 37–48.5)
HGB BLD-MCNC: 8.3 G/DL (ref 12–16)
IMM GRANULOCYTES # BLD AUTO: 0.11 K/UL (ref 0–0.04)
IMM GRANULOCYTES NFR BLD AUTO: 0.9 % (ref 0–0.5)
LYMPHOCYTES # BLD AUTO: 1.5 K/UL (ref 1–4.8)
LYMPHOCYTES NFR BLD: 11.7 % (ref 18–48)
MAGNESIUM SERPL-MCNC: 1.5 MG/DL (ref 1.6–2.6)
MCH RBC QN AUTO: 26.9 PG (ref 27–31)
MCHC RBC AUTO-ENTMCNC: 33.6 G/DL (ref 32–36)
MCV RBC AUTO: 80 FL (ref 82–98)
MONOCYTES # BLD AUTO: 0.8 K/UL (ref 0.3–1)
MONOCYTES NFR BLD: 6.1 % (ref 4–15)
NEUTROPHILS # BLD AUTO: 10.1 K/UL (ref 1.8–7.7)
NEUTROPHILS NFR BLD: 80.4 % (ref 38–73)
NRBC BLD-RTO: 0 /100 WBC
O+P STL MICRO: NORMAL
PHOSPHATE SERPL-MCNC: 2.1 MG/DL (ref 2.7–4.5)
PLATELET # BLD AUTO: 215 K/UL (ref 150–450)
PMV BLD AUTO: 10.8 FL (ref 9.2–12.9)
POTASSIUM SERPL-SCNC: 3.3 MMOL/L (ref 3.5–5.1)
PROT SERPL-MCNC: 4.3 G/DL (ref 6–8.4)
RBC # BLD AUTO: 3.08 M/UL (ref 4–5.4)
SODIUM SERPL-SCNC: 138 MMOL/L (ref 136–145)
T4 FREE SERPL-MCNC: 1.58 NG/DL (ref 0.71–1.51)
WBC # BLD AUTO: 12.6 K/UL (ref 3.9–12.7)

## 2021-08-11 PROCEDURE — 80053 COMPREHEN METABOLIC PANEL: CPT | Performed by: STUDENT IN AN ORGANIZED HEALTH CARE EDUCATION/TRAINING PROGRAM

## 2021-08-11 PROCEDURE — 97530 THERAPEUTIC ACTIVITIES: CPT

## 2021-08-11 PROCEDURE — 36415 COLL VENOUS BLD VENIPUNCTURE: CPT | Performed by: STUDENT IN AN ORGANIZED HEALTH CARE EDUCATION/TRAINING PROGRAM

## 2021-08-11 PROCEDURE — 97162 PT EVAL MOD COMPLEX 30 MIN: CPT

## 2021-08-11 PROCEDURE — 99232 SBSQ HOSP IP/OBS MODERATE 35: CPT | Mod: ,,, | Performed by: INTERNAL MEDICINE

## 2021-08-11 PROCEDURE — 97535 SELF CARE MNGMENT TRAINING: CPT

## 2021-08-11 PROCEDURE — 27000207 HC ISOLATION

## 2021-08-11 PROCEDURE — 99232 PR SUBSEQUENT HOSPITAL CARE,LEVL II: ICD-10-PCS | Mod: ,,, | Performed by: INTERNAL MEDICINE

## 2021-08-11 PROCEDURE — 11000001 HC ACUTE MED/SURG PRIVATE ROOM

## 2021-08-11 PROCEDURE — 63600175 PHARM REV CODE 636 W HCPCS: Performed by: STUDENT IN AN ORGANIZED HEALTH CARE EDUCATION/TRAINING PROGRAM

## 2021-08-11 PROCEDURE — 25000003 PHARM REV CODE 250: Performed by: STUDENT IN AN ORGANIZED HEALTH CARE EDUCATION/TRAINING PROGRAM

## 2021-08-11 PROCEDURE — 85025 COMPLETE CBC W/AUTO DIFF WBC: CPT | Performed by: STUDENT IN AN ORGANIZED HEALTH CARE EDUCATION/TRAINING PROGRAM

## 2021-08-11 PROCEDURE — 84439 ASSAY OF FREE THYROXINE: CPT | Performed by: STUDENT IN AN ORGANIZED HEALTH CARE EDUCATION/TRAINING PROGRAM

## 2021-08-11 PROCEDURE — 97165 OT EVAL LOW COMPLEX 30 MIN: CPT

## 2021-08-11 PROCEDURE — 83735 ASSAY OF MAGNESIUM: CPT | Performed by: STUDENT IN AN ORGANIZED HEALTH CARE EDUCATION/TRAINING PROGRAM

## 2021-08-11 PROCEDURE — 84100 ASSAY OF PHOSPHORUS: CPT | Performed by: STUDENT IN AN ORGANIZED HEALTH CARE EDUCATION/TRAINING PROGRAM

## 2021-08-11 RX ORDER — METHIMAZOLE 5 MG/1
20 TABLET ORAL DAILY
Status: DISCONTINUED | OUTPATIENT
Start: 2021-08-12 | End: 2021-08-17

## 2021-08-11 RX ORDER — MAGNESIUM SULFATE HEPTAHYDRATE 40 MG/ML
2 INJECTION, SOLUTION INTRAVENOUS
Status: COMPLETED | OUTPATIENT
Start: 2021-08-11 | End: 2021-08-11

## 2021-08-11 RX ADMIN — POTASSIUM BICARBONATE 40 MEQ: 391 TABLET, EFFERVESCENT ORAL at 10:08

## 2021-08-11 RX ADMIN — VANCOMYCIN HYDROCHLORIDE 125 MG: KIT at 03:08

## 2021-08-11 RX ADMIN — PROPRANOLOL HYDROCHLORIDE 20 MG: 10 TABLET ORAL at 10:08

## 2021-08-11 RX ADMIN — ENOXAPARIN SODIUM 40 MG: 40 INJECTION SUBCUTANEOUS at 05:08

## 2021-08-11 RX ADMIN — POTASSIUM PHOSPHATE, MONOBASIC AND POTASSIUM PHOSPHATE, DIBASIC 30 MMOL: 224; 236 INJECTION, SOLUTION, CONCENTRATE INTRAVENOUS at 10:08

## 2021-08-11 RX ADMIN — MAGNESIUM SULFATE 2 G: 2 INJECTION INTRAVENOUS at 10:08

## 2021-08-11 RX ADMIN — PROPRANOLOL HYDROCHLORIDE 20 MG: 10 TABLET ORAL at 03:08

## 2021-08-11 RX ADMIN — PIPERACILLIN SODIUM AND TAZOBACTAM SODIUM 4.5 G: 4; .5 INJECTION, POWDER, FOR SOLUTION INTRAVENOUS at 05:08

## 2021-08-11 RX ADMIN — ATORVASTATIN CALCIUM 40 MG: 20 TABLET, FILM COATED ORAL at 10:08

## 2021-08-11 RX ADMIN — SODIUM CHLORIDE, SODIUM LACTATE, POTASSIUM CHLORIDE, AND CALCIUM CHLORIDE: 600; 310; 30; 20 INJECTION, SOLUTION INTRAVENOUS at 01:08

## 2021-08-11 RX ADMIN — PAROXETINE 40 MG: 10 TABLET, FILM COATED ORAL at 10:08

## 2021-08-11 RX ADMIN — PROPRANOLOL HYDROCHLORIDE 20 MG: 10 TABLET ORAL at 08:08

## 2021-08-11 RX ADMIN — VANCOMYCIN HYDROCHLORIDE 125 MG: KIT at 08:08

## 2021-08-11 RX ADMIN — METHIMAZOLE 20 MG: 5 TABLET ORAL at 10:08

## 2021-08-11 RX ADMIN — VANCOMYCIN HYDROCHLORIDE 125 MG: KIT at 09:08

## 2021-08-11 RX ADMIN — PIPERACILLIN SODIUM AND TAZOBACTAM SODIUM 4.5 G: 4; .5 INJECTION, POWDER, FOR SOLUTION INTRAVENOUS at 10:08

## 2021-08-11 RX ADMIN — MAGNESIUM SULFATE 2 G: 2 INJECTION INTRAVENOUS at 12:08

## 2021-08-11 RX ADMIN — PIPERACILLIN SODIUM AND TAZOBACTAM SODIUM 4.5 G: 4; .5 INJECTION, POWDER, FOR SOLUTION INTRAVENOUS at 01:08

## 2021-08-11 RX ADMIN — AMITRIPTYLINE HYDROCHLORIDE 150 MG: 50 TABLET, FILM COATED ORAL at 08:08

## 2021-08-11 RX ADMIN — PANTOPRAZOLE SODIUM 40 MG: 40 TABLET, DELAYED RELEASE ORAL at 10:08

## 2021-08-12 LAB
ALBUMIN SERPL BCP-MCNC: 2 G/DL (ref 3.5–5.2)
ALP SERPL-CCNC: 92 U/L (ref 55–135)
ALT SERPL W/O P-5'-P-CCNC: 33 U/L (ref 10–44)
ANION GAP SERPL CALC-SCNC: 9 MMOL/L (ref 8–16)
ANISOCYTOSIS BLD QL SMEAR: SLIGHT
AST SERPL-CCNC: 42 U/L (ref 10–40)
BACTERIA STL CULT: NORMAL
BASOPHILS # BLD AUTO: 0.03 K/UL (ref 0–0.2)
BASOPHILS NFR BLD: 0.4 % (ref 0–1.9)
BILIRUB SERPL-MCNC: 0.3 MG/DL (ref 0.1–1)
BUN SERPL-MCNC: 9 MG/DL (ref 6–20)
CALCIUM SERPL-MCNC: 7.9 MG/DL (ref 8.7–10.5)
CHLORIDE SERPL-SCNC: 108 MMOL/L (ref 95–110)
CO2 SERPL-SCNC: 22 MMOL/L (ref 23–29)
CREAT SERPL-MCNC: 0.5 MG/DL (ref 0.5–1.4)
DIFFERENTIAL METHOD: ABNORMAL
EOSINOPHIL # BLD AUTO: 0.1 K/UL (ref 0–0.5)
EOSINOPHIL NFR BLD: 1.1 % (ref 0–8)
ERYTHROCYTE [DISTWIDTH] IN BLOOD BY AUTOMATED COUNT: 13.3 % (ref 11.5–14.5)
EST. GFR  (AFRICAN AMERICAN): >60 ML/MIN/1.73 M^2
EST. GFR  (NON AFRICAN AMERICAN): >60 ML/MIN/1.73 M^2
GLUCOSE SERPL-MCNC: 77 MG/DL (ref 70–110)
HCT VFR BLD AUTO: 26.1 % (ref 37–48.5)
HGB BLD-MCNC: 8.5 G/DL (ref 12–16)
HYPOCHROMIA BLD QL SMEAR: ABNORMAL
IMM GRANULOCYTES # BLD AUTO: 0.13 K/UL (ref 0–0.04)
IMM GRANULOCYTES NFR BLD AUTO: 1.5 % (ref 0–0.5)
LYMPHOCYTES # BLD AUTO: 1.4 K/UL (ref 1–4.8)
LYMPHOCYTES NFR BLD: 16.2 % (ref 18–48)
MAGNESIUM SERPL-MCNC: 2 MG/DL (ref 1.6–2.6)
MCH RBC QN AUTO: 26.4 PG (ref 27–31)
MCHC RBC AUTO-ENTMCNC: 32.6 G/DL (ref 32–36)
MCV RBC AUTO: 81 FL (ref 82–98)
MONOCYTES # BLD AUTO: 0.6 K/UL (ref 0.3–1)
MONOCYTES NFR BLD: 7.1 % (ref 4–15)
NEUTROPHILS # BLD AUTO: 6.2 K/UL (ref 1.8–7.7)
NEUTROPHILS NFR BLD: 73.7 % (ref 38–73)
NRBC BLD-RTO: 0 /100 WBC
OVALOCYTES BLD QL SMEAR: ABNORMAL
PHOSPHATE SERPL-MCNC: 2.6 MG/DL (ref 2.7–4.5)
PLATELET # BLD AUTO: 214 K/UL (ref 150–450)
PLATELET BLD QL SMEAR: ABNORMAL
PMV BLD AUTO: 10.7 FL (ref 9.2–12.9)
POIKILOCYTOSIS BLD QL SMEAR: SLIGHT
POTASSIUM SERPL-SCNC: 3.3 MMOL/L (ref 3.5–5.1)
PROT SERPL-MCNC: 4.4 G/DL (ref 6–8.4)
RBC # BLD AUTO: 3.22 M/UL (ref 4–5.4)
SODIUM SERPL-SCNC: 139 MMOL/L (ref 136–145)
WBC # BLD AUTO: 8.45 K/UL (ref 3.9–12.7)

## 2021-08-12 PROCEDURE — 84100 ASSAY OF PHOSPHORUS: CPT | Performed by: STUDENT IN AN ORGANIZED HEALTH CARE EDUCATION/TRAINING PROGRAM

## 2021-08-12 PROCEDURE — 25000003 PHARM REV CODE 250: Performed by: STUDENT IN AN ORGANIZED HEALTH CARE EDUCATION/TRAINING PROGRAM

## 2021-08-12 PROCEDURE — 80053 COMPREHEN METABOLIC PANEL: CPT | Performed by: STUDENT IN AN ORGANIZED HEALTH CARE EDUCATION/TRAINING PROGRAM

## 2021-08-12 PROCEDURE — 85025 COMPLETE CBC W/AUTO DIFF WBC: CPT | Performed by: STUDENT IN AN ORGANIZED HEALTH CARE EDUCATION/TRAINING PROGRAM

## 2021-08-12 PROCEDURE — 99232 PR SUBSEQUENT HOSPITAL CARE,LEVL II: ICD-10-PCS | Mod: ,,, | Performed by: INTERNAL MEDICINE

## 2021-08-12 PROCEDURE — 36415 COLL VENOUS BLD VENIPUNCTURE: CPT | Performed by: STUDENT IN AN ORGANIZED HEALTH CARE EDUCATION/TRAINING PROGRAM

## 2021-08-12 PROCEDURE — 83735 ASSAY OF MAGNESIUM: CPT | Performed by: STUDENT IN AN ORGANIZED HEALTH CARE EDUCATION/TRAINING PROGRAM

## 2021-08-12 PROCEDURE — 99232 SBSQ HOSP IP/OBS MODERATE 35: CPT | Mod: ,,, | Performed by: INTERNAL MEDICINE

## 2021-08-12 PROCEDURE — 63600175 PHARM REV CODE 636 W HCPCS: Performed by: STUDENT IN AN ORGANIZED HEALTH CARE EDUCATION/TRAINING PROGRAM

## 2021-08-12 PROCEDURE — 99233 PR SUBSEQUENT HOSPITAL CARE,LEVL III: ICD-10-PCS | Mod: ,,, | Performed by: HOSPITALIST

## 2021-08-12 PROCEDURE — 25000003 PHARM REV CODE 250: Performed by: INTERNAL MEDICINE

## 2021-08-12 PROCEDURE — 11000001 HC ACUTE MED/SURG PRIVATE ROOM

## 2021-08-12 PROCEDURE — 99233 SBSQ HOSP IP/OBS HIGH 50: CPT | Mod: ,,, | Performed by: HOSPITALIST

## 2021-08-12 PROCEDURE — 27000207 HC ISOLATION

## 2021-08-12 RX ADMIN — PROPRANOLOL HYDROCHLORIDE 20 MG: 10 TABLET ORAL at 09:08

## 2021-08-12 RX ADMIN — PIPERACILLIN SODIUM AND TAZOBACTAM SODIUM 4.5 G: 4; .5 INJECTION, POWDER, FOR SOLUTION INTRAVENOUS at 09:08

## 2021-08-12 RX ADMIN — VANCOMYCIN HYDROCHLORIDE 125 MG: KIT at 09:08

## 2021-08-12 RX ADMIN — METHIMAZOLE 20 MG: 5 TABLET ORAL at 09:08

## 2021-08-12 RX ADMIN — ATORVASTATIN CALCIUM 40 MG: 20 TABLET, FILM COATED ORAL at 09:08

## 2021-08-12 RX ADMIN — ENOXAPARIN SODIUM 40 MG: 40 INJECTION SUBCUTANEOUS at 05:08

## 2021-08-12 RX ADMIN — POTASSIUM BICARBONATE 50 MEQ: 978 TABLET, EFFERVESCENT ORAL at 09:08

## 2021-08-12 RX ADMIN — VANCOMYCIN HYDROCHLORIDE 125 MG: KIT at 02:08

## 2021-08-12 RX ADMIN — PROPRANOLOL HYDROCHLORIDE 20 MG: 10 TABLET ORAL at 08:08

## 2021-08-12 RX ADMIN — PAROXETINE 40 MG: 10 TABLET, FILM COATED ORAL at 09:08

## 2021-08-12 RX ADMIN — PANTOPRAZOLE SODIUM 40 MG: 40 TABLET, DELAYED RELEASE ORAL at 09:08

## 2021-08-12 RX ADMIN — PROPRANOLOL HYDROCHLORIDE 20 MG: 10 TABLET ORAL at 02:08

## 2021-08-12 RX ADMIN — VANCOMYCIN HYDROCHLORIDE 125 MG: KIT at 03:08

## 2021-08-12 RX ADMIN — AMITRIPTYLINE HYDROCHLORIDE 150 MG: 50 TABLET, FILM COATED ORAL at 08:08

## 2021-08-12 RX ADMIN — Medication 2000 UNITS: at 09:08

## 2021-08-12 RX ADMIN — PIPERACILLIN SODIUM AND TAZOBACTAM SODIUM 4.5 G: 4; .5 INJECTION, POWDER, FOR SOLUTION INTRAVENOUS at 03:08

## 2021-08-12 RX ADMIN — VANCOMYCIN HYDROCHLORIDE 125 MG: KIT at 08:08

## 2021-08-13 LAB
ALBUMIN SERPL BCP-MCNC: 2.2 G/DL (ref 3.5–5.2)
ALP SERPL-CCNC: 97 U/L (ref 55–135)
ALT SERPL W/O P-5'-P-CCNC: 42 U/L (ref 10–44)
ANION GAP SERPL CALC-SCNC: 9 MMOL/L (ref 8–16)
AST SERPL-CCNC: 51 U/L (ref 10–40)
BACTERIA BLD CULT: NORMAL
BACTERIA BLD CULT: NORMAL
BASOPHILS # BLD AUTO: 0.05 K/UL (ref 0–0.2)
BASOPHILS NFR BLD: 0.6 % (ref 0–1.9)
BILIRUB SERPL-MCNC: 0.3 MG/DL (ref 0.1–1)
BUN SERPL-MCNC: 7 MG/DL (ref 6–20)
CALCIUM SERPL-MCNC: 7.6 MG/DL (ref 8.7–10.5)
CHLORIDE SERPL-SCNC: 106 MMOL/L (ref 95–110)
CO2 SERPL-SCNC: 22 MMOL/L (ref 23–29)
CREAT SERPL-MCNC: 0.5 MG/DL (ref 0.5–1.4)
DIFFERENTIAL METHOD: ABNORMAL
EOSINOPHIL # BLD AUTO: 0.1 K/UL (ref 0–0.5)
EOSINOPHIL NFR BLD: 1.1 % (ref 0–8)
ERYTHROCYTE [DISTWIDTH] IN BLOOD BY AUTOMATED COUNT: 13.1 % (ref 11.5–14.5)
EST. GFR  (AFRICAN AMERICAN): >60 ML/MIN/1.73 M^2
EST. GFR  (NON AFRICAN AMERICAN): >60 ML/MIN/1.73 M^2
GLUCOSE SERPL-MCNC: 74 MG/DL (ref 70–110)
HCT VFR BLD AUTO: 28.5 % (ref 37–48.5)
HGB BLD-MCNC: 9.4 G/DL (ref 12–16)
IMM GRANULOCYTES # BLD AUTO: 0.35 K/UL (ref 0–0.04)
IMM GRANULOCYTES NFR BLD AUTO: 4.4 % (ref 0–0.5)
LYMPHOCYTES # BLD AUTO: 1.7 K/UL (ref 1–4.8)
LYMPHOCYTES NFR BLD: 21.1 % (ref 18–48)
MAGNESIUM SERPL-MCNC: 1.6 MG/DL (ref 1.6–2.6)
MCH RBC QN AUTO: 26.1 PG (ref 27–31)
MCHC RBC AUTO-ENTMCNC: 33 G/DL (ref 32–36)
MCV RBC AUTO: 79 FL (ref 82–98)
MONOCYTES # BLD AUTO: 0.5 K/UL (ref 0.3–1)
MONOCYTES NFR BLD: 6.9 % (ref 4–15)
NEUTROPHILS # BLD AUTO: 5.2 K/UL (ref 1.8–7.7)
NEUTROPHILS NFR BLD: 65.9 % (ref 38–73)
NRBC BLD-RTO: 0 /100 WBC
PHOSPHATE SERPL-MCNC: 2.2 MG/DL (ref 2.7–4.5)
PLATELET # BLD AUTO: 235 K/UL (ref 150–450)
PMV BLD AUTO: 10.6 FL (ref 9.2–12.9)
POTASSIUM SERPL-SCNC: 3.3 MMOL/L (ref 3.5–5.1)
PROT SERPL-MCNC: 4.8 G/DL (ref 6–8.4)
RBC # BLD AUTO: 3.6 M/UL (ref 4–5.4)
SODIUM SERPL-SCNC: 137 MMOL/L (ref 136–145)
T4 FREE SERPL-MCNC: 1.16 NG/DL (ref 0.71–1.51)
TSH SERPL DL<=0.005 MIU/L-ACNC: <0.01 UIU/ML (ref 0.4–4)
WBC # BLD AUTO: 7.87 K/UL (ref 3.9–12.7)

## 2021-08-13 PROCEDURE — 25000003 PHARM REV CODE 250: Performed by: STUDENT IN AN ORGANIZED HEALTH CARE EDUCATION/TRAINING PROGRAM

## 2021-08-13 PROCEDURE — 80053 COMPREHEN METABOLIC PANEL: CPT | Performed by: STUDENT IN AN ORGANIZED HEALTH CARE EDUCATION/TRAINING PROGRAM

## 2021-08-13 PROCEDURE — 84439 ASSAY OF FREE THYROXINE: CPT | Performed by: STUDENT IN AN ORGANIZED HEALTH CARE EDUCATION/TRAINING PROGRAM

## 2021-08-13 PROCEDURE — 25000003 PHARM REV CODE 250: Performed by: INTERNAL MEDICINE

## 2021-08-13 PROCEDURE — 99232 SBSQ HOSP IP/OBS MODERATE 35: CPT | Mod: ,,, | Performed by: INTERNAL MEDICINE

## 2021-08-13 PROCEDURE — 99231 SBSQ HOSP IP/OBS SF/LOW 25: CPT | Mod: ,,, | Performed by: HOSPITALIST

## 2021-08-13 PROCEDURE — 11000001 HC ACUTE MED/SURG PRIVATE ROOM

## 2021-08-13 PROCEDURE — 83735 ASSAY OF MAGNESIUM: CPT | Performed by: STUDENT IN AN ORGANIZED HEALTH CARE EDUCATION/TRAINING PROGRAM

## 2021-08-13 PROCEDURE — 27000207 HC ISOLATION

## 2021-08-13 PROCEDURE — 84443 ASSAY THYROID STIM HORMONE: CPT | Performed by: STUDENT IN AN ORGANIZED HEALTH CARE EDUCATION/TRAINING PROGRAM

## 2021-08-13 PROCEDURE — 99231 PR SUBSEQUENT HOSPITAL CARE,LEVL I: ICD-10-PCS | Mod: ,,, | Performed by: HOSPITALIST

## 2021-08-13 PROCEDURE — 86580 TB INTRADERMAL TEST: CPT | Performed by: HOSPITALIST

## 2021-08-13 PROCEDURE — 84100 ASSAY OF PHOSPHORUS: CPT | Performed by: STUDENT IN AN ORGANIZED HEALTH CARE EDUCATION/TRAINING PROGRAM

## 2021-08-13 PROCEDURE — 30200315 PPD INTRADERMAL TEST REV CODE 302: Performed by: HOSPITALIST

## 2021-08-13 PROCEDURE — 85025 COMPLETE CBC W/AUTO DIFF WBC: CPT | Performed by: STUDENT IN AN ORGANIZED HEALTH CARE EDUCATION/TRAINING PROGRAM

## 2021-08-13 PROCEDURE — 63600175 PHARM REV CODE 636 W HCPCS: Performed by: STUDENT IN AN ORGANIZED HEALTH CARE EDUCATION/TRAINING PROGRAM

## 2021-08-13 PROCEDURE — 36415 COLL VENOUS BLD VENIPUNCTURE: CPT | Performed by: STUDENT IN AN ORGANIZED HEALTH CARE EDUCATION/TRAINING PROGRAM

## 2021-08-13 PROCEDURE — 99232 PR SUBSEQUENT HOSPITAL CARE,LEVL II: ICD-10-PCS | Mod: ,,, | Performed by: INTERNAL MEDICINE

## 2021-08-13 RX ADMIN — PROPRANOLOL HYDROCHLORIDE 20 MG: 10 TABLET ORAL at 02:08

## 2021-08-13 RX ADMIN — AMITRIPTYLINE HYDROCHLORIDE 150 MG: 50 TABLET, FILM COATED ORAL at 08:08

## 2021-08-13 RX ADMIN — VANCOMYCIN HYDROCHLORIDE 125 MG: KIT at 02:08

## 2021-08-13 RX ADMIN — ATORVASTATIN CALCIUM 40 MG: 20 TABLET, FILM COATED ORAL at 08:08

## 2021-08-13 RX ADMIN — PROPRANOLOL HYDROCHLORIDE 20 MG: 10 TABLET ORAL at 08:08

## 2021-08-13 RX ADMIN — POTASSIUM PHOSPHATE, MONOBASIC AND POTASSIUM PHOSPHATE, DIBASIC 30 MMOL: 224; 236 INJECTION, SOLUTION, CONCENTRATE INTRAVENOUS at 11:08

## 2021-08-13 RX ADMIN — Medication 2000 UNITS: at 08:08

## 2021-08-13 RX ADMIN — ENOXAPARIN SODIUM 40 MG: 40 INJECTION SUBCUTANEOUS at 05:08

## 2021-08-13 RX ADMIN — VANCOMYCIN HYDROCHLORIDE 125 MG: KIT at 08:08

## 2021-08-13 RX ADMIN — TUBERCULIN PURIFIED PROTEIN DERIVATIVE 5 UNITS: 5 INJECTION, SOLUTION INTRADERMAL at 02:08

## 2021-08-13 RX ADMIN — VANCOMYCIN HYDROCHLORIDE 125 MG: KIT at 03:08

## 2021-08-13 RX ADMIN — POTASSIUM BICARBONATE 40 MEQ: 391 TABLET, EFFERVESCENT ORAL at 10:08

## 2021-08-13 RX ADMIN — PAROXETINE 40 MG: 10 TABLET, FILM COATED ORAL at 08:08

## 2021-08-13 RX ADMIN — METHIMAZOLE 20 MG: 5 TABLET ORAL at 08:08

## 2021-08-14 LAB
ALBUMIN SERPL BCP-MCNC: 2.3 G/DL (ref 3.5–5.2)
ALP SERPL-CCNC: 88 U/L (ref 55–135)
ALT SERPL W/O P-5'-P-CCNC: 40 U/L (ref 10–44)
ANION GAP SERPL CALC-SCNC: 8 MMOL/L (ref 8–16)
ANISOCYTOSIS BLD QL SMEAR: SLIGHT
AST SERPL-CCNC: 44 U/L (ref 10–40)
BASOPHILS # BLD AUTO: ABNORMAL K/UL (ref 0–0.2)
BASOPHILS NFR BLD: 0 % (ref 0–1.9)
BILIRUB SERPL-MCNC: 0.3 MG/DL (ref 0.1–1)
BUN SERPL-MCNC: 5 MG/DL (ref 6–20)
CALCIUM SERPL-MCNC: 7.7 MG/DL (ref 8.7–10.5)
CHLORIDE SERPL-SCNC: 104 MMOL/L (ref 95–110)
CO2 SERPL-SCNC: 24 MMOL/L (ref 23–29)
CREAT SERPL-MCNC: 0.4 MG/DL (ref 0.5–1.4)
DIFFERENTIAL METHOD: ABNORMAL
EOSINOPHIL # BLD AUTO: ABNORMAL K/UL (ref 0–0.5)
EOSINOPHIL NFR BLD: 1 % (ref 0–8)
ERYTHROCYTE [DISTWIDTH] IN BLOOD BY AUTOMATED COUNT: 13.1 % (ref 11.5–14.5)
EST. GFR  (AFRICAN AMERICAN): >60 ML/MIN/1.73 M^2
EST. GFR  (NON AFRICAN AMERICAN): >60 ML/MIN/1.73 M^2
GLUCOSE SERPL-MCNC: 87 MG/DL (ref 70–110)
HCT VFR BLD AUTO: 26.4 % (ref 37–48.5)
HGB BLD-MCNC: 9.1 G/DL (ref 12–16)
HYPOCHROMIA BLD QL SMEAR: ABNORMAL
IMM GRANULOCYTES # BLD AUTO: ABNORMAL K/UL (ref 0–0.04)
IMM GRANULOCYTES NFR BLD AUTO: ABNORMAL % (ref 0–0.5)
LYMPHOCYTES # BLD AUTO: ABNORMAL K/UL (ref 1–4.8)
LYMPHOCYTES NFR BLD: 11 % (ref 18–48)
MAGNESIUM SERPL-MCNC: 1.5 MG/DL (ref 1.6–2.6)
MCH RBC QN AUTO: 27 PG (ref 27–31)
MCHC RBC AUTO-ENTMCNC: 34.5 G/DL (ref 32–36)
MCV RBC AUTO: 78 FL (ref 82–98)
MONOCYTES # BLD AUTO: ABNORMAL K/UL (ref 0.3–1)
MONOCYTES NFR BLD: 7 % (ref 4–15)
NEUTROPHILS NFR BLD: 80 % (ref 38–73)
NEUTS BAND NFR BLD MANUAL: 1 %
NRBC BLD-RTO: 0 /100 WBC
OVALOCYTES BLD QL SMEAR: ABNORMAL
PHOSPHATE SERPL-MCNC: 2.8 MG/DL (ref 2.7–4.5)
PLATELET # BLD AUTO: 223 K/UL (ref 150–450)
PMV BLD AUTO: 10.2 FL (ref 9.2–12.9)
POCT GLUCOSE: 166 MG/DL (ref 70–110)
POIKILOCYTOSIS BLD QL SMEAR: SLIGHT
POLYCHROMASIA BLD QL SMEAR: ABNORMAL
POTASSIUM SERPL-SCNC: 3.7 MMOL/L (ref 3.5–5.1)
PROT SERPL-MCNC: 4.6 G/DL (ref 6–8.4)
RBC # BLD AUTO: 3.37 M/UL (ref 4–5.4)
SODIUM SERPL-SCNC: 136 MMOL/L (ref 136–145)
WBC # BLD AUTO: 8.79 K/UL (ref 3.9–12.7)

## 2021-08-14 PROCEDURE — 85007 BL SMEAR W/DIFF WBC COUNT: CPT | Performed by: STUDENT IN AN ORGANIZED HEALTH CARE EDUCATION/TRAINING PROGRAM

## 2021-08-14 PROCEDURE — 99232 PR SUBSEQUENT HOSPITAL CARE,LEVL II: ICD-10-PCS | Mod: ,,, | Performed by: INTERNAL MEDICINE

## 2021-08-14 PROCEDURE — 83735 ASSAY OF MAGNESIUM: CPT | Performed by: STUDENT IN AN ORGANIZED HEALTH CARE EDUCATION/TRAINING PROGRAM

## 2021-08-14 PROCEDURE — 80053 COMPREHEN METABOLIC PANEL: CPT | Performed by: STUDENT IN AN ORGANIZED HEALTH CARE EDUCATION/TRAINING PROGRAM

## 2021-08-14 PROCEDURE — 25000003 PHARM REV CODE 250: Performed by: STUDENT IN AN ORGANIZED HEALTH CARE EDUCATION/TRAINING PROGRAM

## 2021-08-14 PROCEDURE — 99232 SBSQ HOSP IP/OBS MODERATE 35: CPT | Mod: ,,, | Performed by: INTERNAL MEDICINE

## 2021-08-14 PROCEDURE — 97116 GAIT TRAINING THERAPY: CPT | Mod: CQ

## 2021-08-14 PROCEDURE — 25000003 PHARM REV CODE 250: Performed by: INTERNAL MEDICINE

## 2021-08-14 PROCEDURE — 99231 SBSQ HOSP IP/OBS SF/LOW 25: CPT | Mod: ,,, | Performed by: HOSPITALIST

## 2021-08-14 PROCEDURE — 63600175 PHARM REV CODE 636 W HCPCS: Performed by: STUDENT IN AN ORGANIZED HEALTH CARE EDUCATION/TRAINING PROGRAM

## 2021-08-14 PROCEDURE — 99231 PR SUBSEQUENT HOSPITAL CARE,LEVL I: ICD-10-PCS | Mod: ,,, | Performed by: HOSPITALIST

## 2021-08-14 PROCEDURE — 97110 THERAPEUTIC EXERCISES: CPT | Mod: CO

## 2021-08-14 PROCEDURE — 84100 ASSAY OF PHOSPHORUS: CPT | Performed by: STUDENT IN AN ORGANIZED HEALTH CARE EDUCATION/TRAINING PROGRAM

## 2021-08-14 PROCEDURE — 85027 COMPLETE CBC AUTOMATED: CPT | Performed by: STUDENT IN AN ORGANIZED HEALTH CARE EDUCATION/TRAINING PROGRAM

## 2021-08-14 PROCEDURE — 97530 THERAPEUTIC ACTIVITIES: CPT | Mod: CQ

## 2021-08-14 PROCEDURE — 27000207 HC ISOLATION

## 2021-08-14 PROCEDURE — 36415 COLL VENOUS BLD VENIPUNCTURE: CPT | Performed by: STUDENT IN AN ORGANIZED HEALTH CARE EDUCATION/TRAINING PROGRAM

## 2021-08-14 PROCEDURE — 11000001 HC ACUTE MED/SURG PRIVATE ROOM

## 2021-08-14 RX ORDER — AMLODIPINE BESYLATE 10 MG/1
10 TABLET ORAL DAILY
Status: DISCONTINUED | OUTPATIENT
Start: 2021-08-14 | End: 2021-08-19 | Stop reason: HOSPADM

## 2021-08-14 RX ORDER — MAGNESIUM SULFATE HEPTAHYDRATE 40 MG/ML
2 INJECTION, SOLUTION INTRAVENOUS ONCE
Status: COMPLETED | OUTPATIENT
Start: 2021-08-14 | End: 2021-08-14

## 2021-08-14 RX ADMIN — PAROXETINE 40 MG: 10 TABLET, FILM COATED ORAL at 10:08

## 2021-08-14 RX ADMIN — ENOXAPARIN SODIUM 40 MG: 40 INJECTION SUBCUTANEOUS at 06:08

## 2021-08-14 RX ADMIN — PROPRANOLOL HYDROCHLORIDE 20 MG: 10 TABLET ORAL at 10:08

## 2021-08-14 RX ADMIN — AMITRIPTYLINE HYDROCHLORIDE 150 MG: 50 TABLET, FILM COATED ORAL at 09:08

## 2021-08-14 RX ADMIN — VANCOMYCIN HYDROCHLORIDE 125 MG: KIT at 02:08

## 2021-08-14 RX ADMIN — Medication 2000 UNITS: at 10:08

## 2021-08-14 RX ADMIN — MAGNESIUM SULFATE 2 G: 2 INJECTION INTRAVENOUS at 10:08

## 2021-08-14 RX ADMIN — PROPRANOLOL HYDROCHLORIDE 20 MG: 10 TABLET ORAL at 09:08

## 2021-08-14 RX ADMIN — PROPRANOLOL HYDROCHLORIDE 20 MG: 10 TABLET ORAL at 02:08

## 2021-08-14 RX ADMIN — VANCOMYCIN HYDROCHLORIDE 125 MG: KIT at 10:08

## 2021-08-14 RX ADMIN — VANCOMYCIN HYDROCHLORIDE 125 MG: KIT at 03:08

## 2021-08-14 RX ADMIN — METHIMAZOLE 20 MG: 5 TABLET ORAL at 10:08

## 2021-08-14 RX ADMIN — ATORVASTATIN CALCIUM 40 MG: 20 TABLET, FILM COATED ORAL at 10:08

## 2021-08-14 RX ADMIN — AMLODIPINE BESYLATE 10 MG: 10 TABLET ORAL at 10:08

## 2021-08-14 RX ADMIN — VANCOMYCIN HYDROCHLORIDE 125 MG: KIT at 09:08

## 2021-08-15 LAB
ALBUMIN SERPL BCP-MCNC: 2.3 G/DL (ref 3.5–5.2)
ALP SERPL-CCNC: 87 U/L (ref 55–135)
ALT SERPL W/O P-5'-P-CCNC: 33 U/L (ref 10–44)
ANION GAP SERPL CALC-SCNC: 7 MMOL/L (ref 8–16)
ANISOCYTOSIS BLD QL SMEAR: SLIGHT
AST SERPL-CCNC: 32 U/L (ref 10–40)
BASOPHILS # BLD AUTO: ABNORMAL K/UL (ref 0–0.2)
BASOPHILS NFR BLD: 0 % (ref 0–1.9)
BILIRUB SERPL-MCNC: 0.3 MG/DL (ref 0.1–1)
BUN SERPL-MCNC: 6 MG/DL (ref 6–20)
CALCIUM SERPL-MCNC: 8 MG/DL (ref 8.7–10.5)
CHLORIDE SERPL-SCNC: 110 MMOL/L (ref 95–110)
CO2 SERPL-SCNC: 24 MMOL/L (ref 23–29)
CREAT SERPL-MCNC: 0.5 MG/DL (ref 0.5–1.4)
DIFFERENTIAL METHOD: ABNORMAL
EOSINOPHIL # BLD AUTO: ABNORMAL K/UL (ref 0–0.5)
EOSINOPHIL NFR BLD: 4 % (ref 0–8)
ERYTHROCYTE [DISTWIDTH] IN BLOOD BY AUTOMATED COUNT: 13.2 % (ref 11.5–14.5)
EST. GFR  (AFRICAN AMERICAN): >60 ML/MIN/1.73 M^2
EST. GFR  (NON AFRICAN AMERICAN): >60 ML/MIN/1.73 M^2
GLUCOSE SERPL-MCNC: 91 MG/DL (ref 70–110)
HCT VFR BLD AUTO: 25.8 % (ref 37–48.5)
HGB BLD-MCNC: 8.6 G/DL (ref 12–16)
HYPOCHROMIA BLD QL SMEAR: ABNORMAL
IMM GRANULOCYTES # BLD AUTO: ABNORMAL K/UL (ref 0–0.04)
IMM GRANULOCYTES NFR BLD AUTO: ABNORMAL % (ref 0–0.5)
LYMPHOCYTES # BLD AUTO: ABNORMAL K/UL (ref 1–4.8)
LYMPHOCYTES NFR BLD: 14 % (ref 18–48)
MAGNESIUM SERPL-MCNC: 1.7 MG/DL (ref 1.6–2.6)
MCH RBC QN AUTO: 26.2 PG (ref 27–31)
MCHC RBC AUTO-ENTMCNC: 33.3 G/DL (ref 32–36)
MCV RBC AUTO: 79 FL (ref 82–98)
MONOCYTES # BLD AUTO: ABNORMAL K/UL (ref 0.3–1)
MONOCYTES NFR BLD: 6 % (ref 4–15)
MYELOCYTES NFR BLD MANUAL: 3 %
NEUTROPHILS NFR BLD: 72 % (ref 38–73)
NEUTS BAND NFR BLD MANUAL: 1 %
NRBC BLD-RTO: 0 /100 WBC
OVALOCYTES BLD QL SMEAR: ABNORMAL
PHOSPHATE SERPL-MCNC: 2.7 MG/DL (ref 2.7–4.5)
PLATELET # BLD AUTO: 232 K/UL (ref 150–450)
PMV BLD AUTO: 10.2 FL (ref 9.2–12.9)
POIKILOCYTOSIS BLD QL SMEAR: SLIGHT
POLYCHROMASIA BLD QL SMEAR: ABNORMAL
POTASSIUM SERPL-SCNC: 3.8 MMOL/L (ref 3.5–5.1)
PROT SERPL-MCNC: 4.7 G/DL (ref 6–8.4)
RBC # BLD AUTO: 3.28 M/UL (ref 4–5.4)
SODIUM SERPL-SCNC: 141 MMOL/L (ref 136–145)
SPHEROCYTES BLD QL SMEAR: ABNORMAL
T4 FREE SERPL-MCNC: 1.25 NG/DL (ref 0.71–1.51)
TB INDURATION 48 - 72 HR READ: 0 MM
WBC # BLD AUTO: 9.64 K/UL (ref 3.9–12.7)

## 2021-08-15 PROCEDURE — 80053 COMPREHEN METABOLIC PANEL: CPT | Performed by: STUDENT IN AN ORGANIZED HEALTH CARE EDUCATION/TRAINING PROGRAM

## 2021-08-15 PROCEDURE — 36415 COLL VENOUS BLD VENIPUNCTURE: CPT | Performed by: STUDENT IN AN ORGANIZED HEALTH CARE EDUCATION/TRAINING PROGRAM

## 2021-08-15 PROCEDURE — 25000003 PHARM REV CODE 250: Performed by: STUDENT IN AN ORGANIZED HEALTH CARE EDUCATION/TRAINING PROGRAM

## 2021-08-15 PROCEDURE — 27000207 HC ISOLATION

## 2021-08-15 PROCEDURE — 84100 ASSAY OF PHOSPHORUS: CPT | Performed by: STUDENT IN AN ORGANIZED HEALTH CARE EDUCATION/TRAINING PROGRAM

## 2021-08-15 PROCEDURE — 84439 ASSAY OF FREE THYROXINE: CPT | Performed by: STUDENT IN AN ORGANIZED HEALTH CARE EDUCATION/TRAINING PROGRAM

## 2021-08-15 PROCEDURE — 83735 ASSAY OF MAGNESIUM: CPT | Performed by: STUDENT IN AN ORGANIZED HEALTH CARE EDUCATION/TRAINING PROGRAM

## 2021-08-15 PROCEDURE — 11000001 HC ACUTE MED/SURG PRIVATE ROOM

## 2021-08-15 PROCEDURE — 85027 COMPLETE CBC AUTOMATED: CPT | Performed by: STUDENT IN AN ORGANIZED HEALTH CARE EDUCATION/TRAINING PROGRAM

## 2021-08-15 PROCEDURE — 85007 BL SMEAR W/DIFF WBC COUNT: CPT | Performed by: STUDENT IN AN ORGANIZED HEALTH CARE EDUCATION/TRAINING PROGRAM

## 2021-08-15 PROCEDURE — 63600175 PHARM REV CODE 636 W HCPCS: Performed by: STUDENT IN AN ORGANIZED HEALTH CARE EDUCATION/TRAINING PROGRAM

## 2021-08-15 PROCEDURE — 25000003 PHARM REV CODE 250: Performed by: INTERNAL MEDICINE

## 2021-08-15 RX ADMIN — PROPRANOLOL HYDROCHLORIDE 20 MG: 10 TABLET ORAL at 08:08

## 2021-08-15 RX ADMIN — VANCOMYCIN HYDROCHLORIDE 125 MG: KIT at 02:08

## 2021-08-15 RX ADMIN — PROPRANOLOL HYDROCHLORIDE 20 MG: 10 TABLET ORAL at 02:08

## 2021-08-15 RX ADMIN — METHIMAZOLE 20 MG: 5 TABLET ORAL at 08:08

## 2021-08-15 RX ADMIN — ENOXAPARIN SODIUM 40 MG: 40 INJECTION SUBCUTANEOUS at 06:08

## 2021-08-15 RX ADMIN — ATORVASTATIN CALCIUM 40 MG: 20 TABLET, FILM COATED ORAL at 08:08

## 2021-08-15 RX ADMIN — Medication 2000 UNITS: at 08:08

## 2021-08-15 RX ADMIN — PAROXETINE 40 MG: 10 TABLET, FILM COATED ORAL at 08:08

## 2021-08-15 RX ADMIN — AMLODIPINE BESYLATE 10 MG: 10 TABLET ORAL at 08:08

## 2021-08-15 RX ADMIN — VANCOMYCIN HYDROCHLORIDE 125 MG: KIT at 03:08

## 2021-08-15 RX ADMIN — VANCOMYCIN HYDROCHLORIDE 125 MG: KIT at 08:08

## 2021-08-15 RX ADMIN — AMITRIPTYLINE HYDROCHLORIDE 150 MG: 50 TABLET, FILM COATED ORAL at 08:08

## 2021-08-16 LAB
ALBUMIN SERPL BCP-MCNC: 2.6 G/DL (ref 3.5–5.2)
ALP SERPL-CCNC: 91 U/L (ref 55–135)
ALT SERPL W/O P-5'-P-CCNC: 31 U/L (ref 10–44)
ANION GAP SERPL CALC-SCNC: 11 MMOL/L (ref 8–16)
ANISOCYTOSIS BLD QL SMEAR: SLIGHT
AST SERPL-CCNC: 31 U/L (ref 10–40)
BASOPHILS NFR BLD: 0 % (ref 0–1.9)
BILIRUB SERPL-MCNC: 0.3 MG/DL (ref 0.1–1)
BUN SERPL-MCNC: 6 MG/DL (ref 6–20)
CALCIUM SERPL-MCNC: 8.4 MG/DL (ref 8.7–10.5)
CHLORIDE SERPL-SCNC: 109 MMOL/L (ref 95–110)
CO2 SERPL-SCNC: 21 MMOL/L (ref 23–29)
CREAT SERPL-MCNC: 0.6 MG/DL (ref 0.5–1.4)
DIFFERENTIAL METHOD: ABNORMAL
EOSINOPHIL NFR BLD: 3 % (ref 0–8)
ERYTHROCYTE [DISTWIDTH] IN BLOOD BY AUTOMATED COUNT: 13.5 % (ref 11.5–14.5)
EST. GFR  (AFRICAN AMERICAN): >60 ML/MIN/1.73 M^2
EST. GFR  (NON AFRICAN AMERICAN): >60 ML/MIN/1.73 M^2
GLUCOSE SERPL-MCNC: 92 MG/DL (ref 70–110)
HCT VFR BLD AUTO: 28.2 % (ref 37–48.5)
HGB BLD-MCNC: 9.5 G/DL (ref 12–16)
HYPOCHROMIA BLD QL SMEAR: ABNORMAL
IMM GRANULOCYTES # BLD AUTO: ABNORMAL K/UL (ref 0–0.04)
IMM GRANULOCYTES NFR BLD AUTO: ABNORMAL % (ref 0–0.5)
LYMPHOCYTES NFR BLD: 18 % (ref 18–48)
MAGNESIUM SERPL-MCNC: 1.6 MG/DL (ref 1.6–2.6)
MCH RBC QN AUTO: 26.9 PG (ref 27–31)
MCHC RBC AUTO-ENTMCNC: 33.7 G/DL (ref 32–36)
MCV RBC AUTO: 80 FL (ref 82–98)
METAMYELOCYTES NFR BLD MANUAL: 1 %
MONOCYTES NFR BLD: 3 % (ref 4–15)
MYELOCYTES NFR BLD MANUAL: 1 %
NEUTROPHILS NFR BLD: 67 % (ref 38–73)
NEUTS BAND NFR BLD MANUAL: 7 %
NRBC BLD-RTO: 0 /100 WBC
OVALOCYTES BLD QL SMEAR: ABNORMAL
PHOSPHATE SERPL-MCNC: 3.2 MG/DL (ref 2.7–4.5)
PLATELET # BLD AUTO: 253 K/UL (ref 150–450)
PLATELET BLD QL SMEAR: ABNORMAL
PMV BLD AUTO: 10.2 FL (ref 9.2–12.9)
POIKILOCYTOSIS BLD QL SMEAR: SLIGHT
POTASSIUM SERPL-SCNC: 3.7 MMOL/L (ref 3.5–5.1)
PROT SERPL-MCNC: 5.2 G/DL (ref 6–8.4)
RBC # BLD AUTO: 3.53 M/UL (ref 4–5.4)
SARS-COV-2 RNA RESP QL NAA+PROBE: NOT DETECTED
SODIUM SERPL-SCNC: 141 MMOL/L (ref 136–145)
SPHEROCYTES BLD QL SMEAR: ABNORMAL
T4 FREE SERPL-MCNC: 1.16 NG/DL (ref 0.71–1.51)
WBC # BLD AUTO: 11.13 K/UL (ref 3.9–12.7)

## 2021-08-16 PROCEDURE — 25000003 PHARM REV CODE 250: Performed by: STUDENT IN AN ORGANIZED HEALTH CARE EDUCATION/TRAINING PROGRAM

## 2021-08-16 PROCEDURE — 85027 COMPLETE CBC AUTOMATED: CPT | Performed by: STUDENT IN AN ORGANIZED HEALTH CARE EDUCATION/TRAINING PROGRAM

## 2021-08-16 PROCEDURE — 84439 ASSAY OF FREE THYROXINE: CPT | Performed by: STUDENT IN AN ORGANIZED HEALTH CARE EDUCATION/TRAINING PROGRAM

## 2021-08-16 PROCEDURE — 99232 PR SUBSEQUENT HOSPITAL CARE,LEVL II: ICD-10-PCS | Mod: ,,, | Performed by: INTERNAL MEDICINE

## 2021-08-16 PROCEDURE — 99232 SBSQ HOSP IP/OBS MODERATE 35: CPT | Mod: ,,, | Performed by: INTERNAL MEDICINE

## 2021-08-16 PROCEDURE — U0005 INFEC AGEN DETEC AMPLI PROBE: HCPCS | Performed by: HOSPITALIST

## 2021-08-16 PROCEDURE — 99232 SBSQ HOSP IP/OBS MODERATE 35: CPT | Mod: ,,, | Performed by: HOSPITALIST

## 2021-08-16 PROCEDURE — U0003 INFECTIOUS AGENT DETECTION BY NUCLEIC ACID (DNA OR RNA); SEVERE ACUTE RESPIRATORY SYNDROME CORONAVIRUS 2 (SARS-COV-2) (CORONAVIRUS DISEASE [COVID-19]), AMPLIFIED PROBE TECHNIQUE, MAKING USE OF HIGH THROUGHPUT TECHNOLOGIES AS DESCRIBED BY CMS-2020-01-R: HCPCS | Performed by: HOSPITALIST

## 2021-08-16 PROCEDURE — 25000003 PHARM REV CODE 250: Performed by: INTERNAL MEDICINE

## 2021-08-16 PROCEDURE — 97116 GAIT TRAINING THERAPY: CPT

## 2021-08-16 PROCEDURE — 97112 NEUROMUSCULAR REEDUCATION: CPT

## 2021-08-16 PROCEDURE — 27000207 HC ISOLATION

## 2021-08-16 PROCEDURE — 63600175 PHARM REV CODE 636 W HCPCS: Performed by: STUDENT IN AN ORGANIZED HEALTH CARE EDUCATION/TRAINING PROGRAM

## 2021-08-16 PROCEDURE — 83735 ASSAY OF MAGNESIUM: CPT | Performed by: STUDENT IN AN ORGANIZED HEALTH CARE EDUCATION/TRAINING PROGRAM

## 2021-08-16 PROCEDURE — 84100 ASSAY OF PHOSPHORUS: CPT | Performed by: STUDENT IN AN ORGANIZED HEALTH CARE EDUCATION/TRAINING PROGRAM

## 2021-08-16 PROCEDURE — 85007 BL SMEAR W/DIFF WBC COUNT: CPT | Performed by: STUDENT IN AN ORGANIZED HEALTH CARE EDUCATION/TRAINING PROGRAM

## 2021-08-16 PROCEDURE — 80053 COMPREHEN METABOLIC PANEL: CPT | Performed by: STUDENT IN AN ORGANIZED HEALTH CARE EDUCATION/TRAINING PROGRAM

## 2021-08-16 PROCEDURE — 36415 COLL VENOUS BLD VENIPUNCTURE: CPT | Performed by: STUDENT IN AN ORGANIZED HEALTH CARE EDUCATION/TRAINING PROGRAM

## 2021-08-16 PROCEDURE — 11000001 HC ACUTE MED/SURG PRIVATE ROOM

## 2021-08-16 PROCEDURE — 99232 PR SUBSEQUENT HOSPITAL CARE,LEVL II: ICD-10-PCS | Mod: ,,, | Performed by: HOSPITALIST

## 2021-08-16 RX ADMIN — VANCOMYCIN HYDROCHLORIDE 125 MG: KIT at 09:08

## 2021-08-16 RX ADMIN — VANCOMYCIN HYDROCHLORIDE 125 MG: KIT at 10:08

## 2021-08-16 RX ADMIN — PROPRANOLOL HYDROCHLORIDE 20 MG: 10 TABLET ORAL at 10:08

## 2021-08-16 RX ADMIN — PROPRANOLOL HYDROCHLORIDE 20 MG: 10 TABLET ORAL at 09:08

## 2021-08-16 RX ADMIN — METHIMAZOLE 20 MG: 5 TABLET ORAL at 09:08

## 2021-08-16 RX ADMIN — Medication 2000 UNITS: at 09:08

## 2021-08-16 RX ADMIN — PROPRANOLOL HYDROCHLORIDE 20 MG: 10 TABLET ORAL at 02:08

## 2021-08-16 RX ADMIN — AMITRIPTYLINE HYDROCHLORIDE 150 MG: 50 TABLET, FILM COATED ORAL at 10:08

## 2021-08-16 RX ADMIN — PAROXETINE 40 MG: 10 TABLET, FILM COATED ORAL at 09:08

## 2021-08-16 RX ADMIN — ATORVASTATIN CALCIUM 40 MG: 20 TABLET, FILM COATED ORAL at 09:08

## 2021-08-16 RX ADMIN — AMLODIPINE BESYLATE 10 MG: 10 TABLET ORAL at 09:08

## 2021-08-16 RX ADMIN — VANCOMYCIN HYDROCHLORIDE 125 MG: KIT at 03:08

## 2021-08-16 RX ADMIN — VANCOMYCIN HYDROCHLORIDE 125 MG: KIT at 02:08

## 2021-08-16 RX ADMIN — ENOXAPARIN SODIUM 40 MG: 40 INJECTION SUBCUTANEOUS at 05:08

## 2021-08-17 LAB
ALBUMIN SERPL BCP-MCNC: 2.6 G/DL (ref 3.5–5.2)
ALP SERPL-CCNC: 87 U/L (ref 55–135)
ALT SERPL W/O P-5'-P-CCNC: 27 U/L (ref 10–44)
ANION GAP SERPL CALC-SCNC: 10 MMOL/L (ref 8–16)
ANISOCYTOSIS BLD QL SMEAR: SLIGHT
AST SERPL-CCNC: 23 U/L (ref 10–40)
BASOPHILS # BLD AUTO: ABNORMAL K/UL (ref 0–0.2)
BASOPHILS NFR BLD: 0 % (ref 0–1.9)
BILIRUB SERPL-MCNC: 0.4 MG/DL (ref 0.1–1)
BUN SERPL-MCNC: 6 MG/DL (ref 6–20)
CALCIUM SERPL-MCNC: 8.4 MG/DL (ref 8.7–10.5)
CHLORIDE SERPL-SCNC: 108 MMOL/L (ref 95–110)
CO2 SERPL-SCNC: 25 MMOL/L (ref 23–29)
CREAT SERPL-MCNC: 0.5 MG/DL (ref 0.5–1.4)
DIFFERENTIAL METHOD: ABNORMAL
EOSINOPHIL # BLD AUTO: ABNORMAL K/UL (ref 0–0.5)
EOSINOPHIL NFR BLD: 1 % (ref 0–8)
ERYTHROCYTE [DISTWIDTH] IN BLOOD BY AUTOMATED COUNT: 13.6 % (ref 11.5–14.5)
EST. GFR  (AFRICAN AMERICAN): >60 ML/MIN/1.73 M^2
EST. GFR  (NON AFRICAN AMERICAN): >60 ML/MIN/1.73 M^2
GLUCOSE SERPL-MCNC: 118 MG/DL (ref 70–110)
HCT VFR BLD AUTO: 25.3 % (ref 37–48.5)
HGB BLD-MCNC: 8.4 G/DL (ref 12–16)
HYPOCHROMIA BLD QL SMEAR: ABNORMAL
IMM GRANULOCYTES # BLD AUTO: ABNORMAL K/UL (ref 0–0.04)
IMM GRANULOCYTES NFR BLD AUTO: ABNORMAL % (ref 0–0.5)
LYMPHOCYTES # BLD AUTO: ABNORMAL K/UL (ref 1–4.8)
LYMPHOCYTES NFR BLD: 18 % (ref 18–48)
MAGNESIUM SERPL-MCNC: 1.5 MG/DL (ref 1.6–2.6)
MCH RBC QN AUTO: 26.7 PG (ref 27–31)
MCHC RBC AUTO-ENTMCNC: 33.2 G/DL (ref 32–36)
MCV RBC AUTO: 80 FL (ref 82–98)
METAMYELOCYTES NFR BLD MANUAL: 1 %
MONOCYTES # BLD AUTO: ABNORMAL K/UL (ref 0.3–1)
MONOCYTES NFR BLD: 3 % (ref 4–15)
NEUTROPHILS NFR BLD: 75 % (ref 38–73)
NEUTS BAND NFR BLD MANUAL: 1 %
NRBC BLD-RTO: 0 /100 WBC
OVALOCYTES BLD QL SMEAR: ABNORMAL
PHOSPHATE SERPL-MCNC: 3.4 MG/DL (ref 2.7–4.5)
PLATELET # BLD AUTO: 248 K/UL (ref 150–450)
PLATELET BLD QL SMEAR: ABNORMAL
PMV BLD AUTO: 10.1 FL (ref 9.2–12.9)
POIKILOCYTOSIS BLD QL SMEAR: SLIGHT
POLYCHROMASIA BLD QL SMEAR: ABNORMAL
POTASSIUM SERPL-SCNC: 3.9 MMOL/L (ref 3.5–5.1)
PROMYELOCYTES NFR BLD MANUAL: 1 %
PROT SERPL-MCNC: 5.3 G/DL (ref 6–8.4)
RBC # BLD AUTO: 3.15 M/UL (ref 4–5.4)
SODIUM SERPL-SCNC: 143 MMOL/L (ref 136–145)
WBC # BLD AUTO: 9.51 K/UL (ref 3.9–12.7)

## 2021-08-17 PROCEDURE — 85027 COMPLETE CBC AUTOMATED: CPT | Performed by: STUDENT IN AN ORGANIZED HEALTH CARE EDUCATION/TRAINING PROGRAM

## 2021-08-17 PROCEDURE — 85007 BL SMEAR W/DIFF WBC COUNT: CPT | Performed by: STUDENT IN AN ORGANIZED HEALTH CARE EDUCATION/TRAINING PROGRAM

## 2021-08-17 PROCEDURE — 84100 ASSAY OF PHOSPHORUS: CPT | Performed by: HOSPITALIST

## 2021-08-17 PROCEDURE — 27000207 HC ISOLATION

## 2021-08-17 PROCEDURE — 25000003 PHARM REV CODE 250: Performed by: STUDENT IN AN ORGANIZED HEALTH CARE EDUCATION/TRAINING PROGRAM

## 2021-08-17 PROCEDURE — 36415 COLL VENOUS BLD VENIPUNCTURE: CPT | Performed by: HOSPITALIST

## 2021-08-17 PROCEDURE — 11000001 HC ACUTE MED/SURG PRIVATE ROOM

## 2021-08-17 PROCEDURE — 97116 GAIT TRAINING THERAPY: CPT | Mod: CQ

## 2021-08-17 PROCEDURE — 63600175 PHARM REV CODE 636 W HCPCS: Performed by: STUDENT IN AN ORGANIZED HEALTH CARE EDUCATION/TRAINING PROGRAM

## 2021-08-17 PROCEDURE — 97530 THERAPEUTIC ACTIVITIES: CPT

## 2021-08-17 PROCEDURE — 99233 SBSQ HOSP IP/OBS HIGH 50: CPT | Mod: ,,, | Performed by: HOSPITALIST

## 2021-08-17 PROCEDURE — 36415 COLL VENOUS BLD VENIPUNCTURE: CPT | Performed by: STUDENT IN AN ORGANIZED HEALTH CARE EDUCATION/TRAINING PROGRAM

## 2021-08-17 PROCEDURE — 80053 COMPREHEN METABOLIC PANEL: CPT | Performed by: HOSPITALIST

## 2021-08-17 PROCEDURE — 99233 PR SUBSEQUENT HOSPITAL CARE,LEVL III: ICD-10-PCS | Mod: ,,, | Performed by: HOSPITALIST

## 2021-08-17 PROCEDURE — 83735 ASSAY OF MAGNESIUM: CPT | Performed by: HOSPITALIST

## 2021-08-17 PROCEDURE — 97530 THERAPEUTIC ACTIVITIES: CPT | Mod: CQ

## 2021-08-17 RX ORDER — METHIMAZOLE 5 MG/1
10 TABLET ORAL DAILY
Status: DISCONTINUED | OUTPATIENT
Start: 2021-08-17 | End: 2021-08-19 | Stop reason: HOSPADM

## 2021-08-17 RX ADMIN — PROPRANOLOL HYDROCHLORIDE 20 MG: 10 TABLET ORAL at 09:08

## 2021-08-17 RX ADMIN — ATORVASTATIN CALCIUM 40 MG: 20 TABLET, FILM COATED ORAL at 09:08

## 2021-08-17 RX ADMIN — VANCOMYCIN HYDROCHLORIDE 125 MG: KIT at 03:08

## 2021-08-17 RX ADMIN — PROPRANOLOL HYDROCHLORIDE 20 MG: 10 TABLET ORAL at 03:08

## 2021-08-17 RX ADMIN — VANCOMYCIN HYDROCHLORIDE 125 MG: KIT at 09:08

## 2021-08-17 RX ADMIN — AMITRIPTYLINE HYDROCHLORIDE 150 MG: 50 TABLET, FILM COATED ORAL at 08:08

## 2021-08-17 RX ADMIN — ENOXAPARIN SODIUM 40 MG: 40 INJECTION SUBCUTANEOUS at 05:08

## 2021-08-17 RX ADMIN — VANCOMYCIN HYDROCHLORIDE 125 MG: KIT at 08:08

## 2021-08-17 RX ADMIN — AMLODIPINE BESYLATE 10 MG: 10 TABLET ORAL at 09:08

## 2021-08-17 RX ADMIN — PROPRANOLOL HYDROCHLORIDE 20 MG: 10 TABLET ORAL at 08:08

## 2021-08-17 RX ADMIN — METHIMAZOLE 10 MG: 5 TABLET ORAL at 09:08

## 2021-08-17 RX ADMIN — PAROXETINE 40 MG: 10 TABLET, FILM COATED ORAL at 09:08

## 2021-08-17 RX ADMIN — Medication 2000 UNITS: at 09:08

## 2021-08-18 LAB
ALBUMIN SERPL BCP-MCNC: 2.6 G/DL (ref 3.5–5.2)
ALP SERPL-CCNC: 89 U/L (ref 55–135)
ALT SERPL W/O P-5'-P-CCNC: 26 U/L (ref 10–44)
ANION GAP SERPL CALC-SCNC: 8 MMOL/L (ref 8–16)
AST SERPL-CCNC: 21 U/L (ref 10–40)
BASOPHILS # BLD AUTO: 0.04 K/UL (ref 0–0.2)
BASOPHILS NFR BLD: 0.4 % (ref 0–1.9)
BILIRUB SERPL-MCNC: 0.4 MG/DL (ref 0.1–1)
BUN SERPL-MCNC: 9 MG/DL (ref 6–20)
CALCIUM SERPL-MCNC: 8.6 MG/DL (ref 8.7–10.5)
CHLORIDE SERPL-SCNC: 109 MMOL/L (ref 95–110)
CO2 SERPL-SCNC: 25 MMOL/L (ref 23–29)
CREAT SERPL-MCNC: 0.6 MG/DL (ref 0.5–1.4)
DIFFERENTIAL METHOD: ABNORMAL
EOSINOPHIL # BLD AUTO: 0.2 K/UL (ref 0–0.5)
EOSINOPHIL NFR BLD: 2.2 % (ref 0–8)
ERYTHROCYTE [DISTWIDTH] IN BLOOD BY AUTOMATED COUNT: 13.9 % (ref 11.5–14.5)
EST. GFR  (AFRICAN AMERICAN): >60 ML/MIN/1.73 M^2
EST. GFR  (NON AFRICAN AMERICAN): >60 ML/MIN/1.73 M^2
GLUCOSE SERPL-MCNC: 95 MG/DL (ref 70–110)
HCT VFR BLD AUTO: 26.9 % (ref 37–48.5)
HGB BLD-MCNC: 8.6 G/DL (ref 12–16)
IMM GRANULOCYTES # BLD AUTO: 0.3 K/UL (ref 0–0.04)
IMM GRANULOCYTES NFR BLD AUTO: 3.4 % (ref 0–0.5)
LYMPHOCYTES # BLD AUTO: 1.6 K/UL (ref 1–4.8)
LYMPHOCYTES NFR BLD: 17.4 % (ref 18–48)
MAGNESIUM SERPL-MCNC: 1.5 MG/DL (ref 1.6–2.6)
MCH RBC QN AUTO: 26.2 PG (ref 27–31)
MCHC RBC AUTO-ENTMCNC: 32 G/DL (ref 32–36)
MCV RBC AUTO: 82 FL (ref 82–98)
MONOCYTES # BLD AUTO: 0.5 K/UL (ref 0.3–1)
MONOCYTES NFR BLD: 6.1 % (ref 4–15)
NEUTROPHILS # BLD AUTO: 6.3 K/UL (ref 1.8–7.7)
NEUTROPHILS NFR BLD: 70.5 % (ref 38–73)
NRBC BLD-RTO: 0 /100 WBC
PHOSPHATE SERPL-MCNC: 3.3 MG/DL (ref 2.7–4.5)
PLATELET # BLD AUTO: 259 K/UL (ref 150–450)
PMV BLD AUTO: 10 FL (ref 9.2–12.9)
POTASSIUM SERPL-SCNC: 3.8 MMOL/L (ref 3.5–5.1)
PROT SERPL-MCNC: 5.3 G/DL (ref 6–8.4)
RBC # BLD AUTO: 3.28 M/UL (ref 4–5.4)
SODIUM SERPL-SCNC: 142 MMOL/L (ref 136–145)
T4 FREE SERPL-MCNC: 1.22 NG/DL (ref 0.71–1.51)
TSH SERPL DL<=0.005 MIU/L-ACNC: <0.01 UIU/ML (ref 0.4–4)
WBC # BLD AUTO: 8.89 K/UL (ref 3.9–12.7)

## 2021-08-18 PROCEDURE — 97116 GAIT TRAINING THERAPY: CPT | Mod: CQ

## 2021-08-18 PROCEDURE — 99232 SBSQ HOSP IP/OBS MODERATE 35: CPT | Mod: ,,, | Performed by: INTERNAL MEDICINE

## 2021-08-18 PROCEDURE — 25000003 PHARM REV CODE 250: Performed by: STUDENT IN AN ORGANIZED HEALTH CARE EDUCATION/TRAINING PROGRAM

## 2021-08-18 PROCEDURE — 36415 COLL VENOUS BLD VENIPUNCTURE: CPT | Performed by: HOSPITALIST

## 2021-08-18 PROCEDURE — 99232 SBSQ HOSP IP/OBS MODERATE 35: CPT | Mod: ,,, | Performed by: HOSPITALIST

## 2021-08-18 PROCEDURE — 84100 ASSAY OF PHOSPHORUS: CPT | Performed by: HOSPITALIST

## 2021-08-18 PROCEDURE — 99232 PR SUBSEQUENT HOSPITAL CARE,LEVL II: ICD-10-PCS | Mod: ,,, | Performed by: HOSPITALIST

## 2021-08-18 PROCEDURE — 25000003 PHARM REV CODE 250: Performed by: HOSPITALIST

## 2021-08-18 PROCEDURE — 27000207 HC ISOLATION

## 2021-08-18 PROCEDURE — 63600175 PHARM REV CODE 636 W HCPCS: Performed by: STUDENT IN AN ORGANIZED HEALTH CARE EDUCATION/TRAINING PROGRAM

## 2021-08-18 PROCEDURE — 63600175 PHARM REV CODE 636 W HCPCS: Performed by: HOSPITALIST

## 2021-08-18 PROCEDURE — 84439 ASSAY OF FREE THYROXINE: CPT | Performed by: STUDENT IN AN ORGANIZED HEALTH CARE EDUCATION/TRAINING PROGRAM

## 2021-08-18 PROCEDURE — 99223 PR INITIAL HOSPITAL CARE,LEVL III: ICD-10-PCS | Mod: ,,, | Performed by: PHYSICIAN ASSISTANT

## 2021-08-18 PROCEDURE — 97110 THERAPEUTIC EXERCISES: CPT | Mod: CQ

## 2021-08-18 PROCEDURE — 80053 COMPREHEN METABOLIC PANEL: CPT | Performed by: HOSPITALIST

## 2021-08-18 PROCEDURE — 99232 PR SUBSEQUENT HOSPITAL CARE,LEVL II: ICD-10-PCS | Mod: ,,, | Performed by: INTERNAL MEDICINE

## 2021-08-18 PROCEDURE — 85025 COMPLETE CBC W/AUTO DIFF WBC: CPT | Performed by: HOSPITALIST

## 2021-08-18 PROCEDURE — 99223 1ST HOSP IP/OBS HIGH 75: CPT | Mod: ,,, | Performed by: PHYSICIAN ASSISTANT

## 2021-08-18 PROCEDURE — 11000001 HC ACUTE MED/SURG PRIVATE ROOM

## 2021-08-18 PROCEDURE — 83735 ASSAY OF MAGNESIUM: CPT | Performed by: HOSPITALIST

## 2021-08-18 PROCEDURE — 84443 ASSAY THYROID STIM HORMONE: CPT | Performed by: STUDENT IN AN ORGANIZED HEALTH CARE EDUCATION/TRAINING PROGRAM

## 2021-08-18 RX ORDER — MAGNESIUM SULFATE HEPTAHYDRATE 40 MG/ML
2 INJECTION, SOLUTION INTRAVENOUS ONCE
Status: COMPLETED | OUTPATIENT
Start: 2021-08-18 | End: 2021-08-18

## 2021-08-18 RX ORDER — LISINOPRIL 10 MG/1
10 TABLET ORAL DAILY
Status: DISCONTINUED | OUTPATIENT
Start: 2021-08-19 | End: 2021-08-19 | Stop reason: HOSPADM

## 2021-08-18 RX ORDER — LISINOPRIL 20 MG/1
20 TABLET ORAL DAILY
Status: DISCONTINUED | OUTPATIENT
Start: 2021-08-18 | End: 2021-08-18

## 2021-08-18 RX ADMIN — AMLODIPINE BESYLATE 10 MG: 10 TABLET ORAL at 09:08

## 2021-08-18 RX ADMIN — PAROXETINE 40 MG: 10 TABLET, FILM COATED ORAL at 09:08

## 2021-08-18 RX ADMIN — MAGNESIUM SULFATE 2 G: 2 INJECTION INTRAVENOUS at 11:08

## 2021-08-18 RX ADMIN — METHIMAZOLE 10 MG: 5 TABLET ORAL at 09:08

## 2021-08-18 RX ADMIN — PROPRANOLOL HYDROCHLORIDE 20 MG: 10 TABLET ORAL at 08:08

## 2021-08-18 RX ADMIN — VANCOMYCIN HYDROCHLORIDE 125 MG: KIT at 03:08

## 2021-08-18 RX ADMIN — ENOXAPARIN SODIUM 40 MG: 40 INJECTION SUBCUTANEOUS at 05:08

## 2021-08-18 RX ADMIN — ATORVASTATIN CALCIUM 40 MG: 20 TABLET, FILM COATED ORAL at 09:08

## 2021-08-18 RX ADMIN — VANCOMYCIN HYDROCHLORIDE 125 MG: KIT at 08:08

## 2021-08-18 RX ADMIN — AMITRIPTYLINE HYDROCHLORIDE 150 MG: 50 TABLET, FILM COATED ORAL at 08:08

## 2021-08-18 RX ADMIN — VANCOMYCIN HYDROCHLORIDE 125 MG: KIT at 09:08

## 2021-08-18 RX ADMIN — Medication 2000 UNITS: at 09:08

## 2021-08-18 RX ADMIN — PROPRANOLOL HYDROCHLORIDE 20 MG: 10 TABLET ORAL at 03:08

## 2021-08-18 RX ADMIN — LISINOPRIL 20 MG: 20 TABLET ORAL at 11:08

## 2021-08-18 RX ADMIN — PROPRANOLOL HYDROCHLORIDE 20 MG: 10 TABLET ORAL at 09:08

## 2021-08-19 VITALS
SYSTOLIC BLOOD PRESSURE: 136 MMHG | RESPIRATION RATE: 20 BRPM | OXYGEN SATURATION: 93 % | TEMPERATURE: 97 F | WEIGHT: 145.06 LBS | HEART RATE: 76 BPM | DIASTOLIC BLOOD PRESSURE: 55 MMHG | BODY MASS INDEX: 24.77 KG/M2 | HEIGHT: 64 IN

## 2021-08-19 PROBLEM — A41.9 SEVERE SEPSIS: Status: RESOLVED | Noted: 2021-08-08 | Resolved: 2021-08-19

## 2021-08-19 PROBLEM — R65.20 SEVERE SEPSIS: Status: RESOLVED | Noted: 2021-08-08 | Resolved: 2021-08-19

## 2021-08-19 LAB
ALBUMIN SERPL BCP-MCNC: 2.5 G/DL (ref 3.5–5.2)
ALP SERPL-CCNC: 84 U/L (ref 55–135)
ALT SERPL W/O P-5'-P-CCNC: 23 U/L (ref 10–44)
ANION GAP SERPL CALC-SCNC: 6 MMOL/L (ref 8–16)
AST SERPL-CCNC: 17 U/L (ref 10–40)
BASOPHILS # BLD AUTO: 0.02 K/UL (ref 0–0.2)
BASOPHILS NFR BLD: 0.2 % (ref 0–1.9)
BILIRUB SERPL-MCNC: 0.4 MG/DL (ref 0.1–1)
BUN SERPL-MCNC: 9 MG/DL (ref 6–20)
CALCIUM SERPL-MCNC: 8.5 MG/DL (ref 8.7–10.5)
CHLORIDE SERPL-SCNC: 109 MMOL/L (ref 95–110)
CO2 SERPL-SCNC: 25 MMOL/L (ref 23–29)
CREAT SERPL-MCNC: 0.5 MG/DL (ref 0.5–1.4)
DIFFERENTIAL METHOD: ABNORMAL
EOSINOPHIL # BLD AUTO: 0.2 K/UL (ref 0–0.5)
EOSINOPHIL NFR BLD: 1.9 % (ref 0–8)
ERYTHROCYTE [DISTWIDTH] IN BLOOD BY AUTOMATED COUNT: 14.4 % (ref 11.5–14.5)
EST. GFR  (AFRICAN AMERICAN): >60 ML/MIN/1.73 M^2
EST. GFR  (NON AFRICAN AMERICAN): >60 ML/MIN/1.73 M^2
GLUCOSE SERPL-MCNC: 98 MG/DL (ref 70–110)
HCT VFR BLD AUTO: 26.7 % (ref 37–48.5)
HGB BLD-MCNC: 8.6 G/DL (ref 12–16)
IMM GRANULOCYTES # BLD AUTO: 0.1 K/UL (ref 0–0.04)
IMM GRANULOCYTES NFR BLD AUTO: 1 % (ref 0–0.5)
LYMPHOCYTES # BLD AUTO: 1.5 K/UL (ref 1–4.8)
LYMPHOCYTES NFR BLD: 14.4 % (ref 18–48)
MAGNESIUM SERPL-MCNC: 1.8 MG/DL (ref 1.6–2.6)
MCH RBC QN AUTO: 26.5 PG (ref 27–31)
MCHC RBC AUTO-ENTMCNC: 32.2 G/DL (ref 32–36)
MCV RBC AUTO: 82 FL (ref 82–98)
MONOCYTES # BLD AUTO: 0.6 K/UL (ref 0.3–1)
MONOCYTES NFR BLD: 5.5 % (ref 4–15)
NEUTROPHILS # BLD AUTO: 8.1 K/UL (ref 1.8–7.7)
NEUTROPHILS NFR BLD: 77 % (ref 38–73)
NRBC BLD-RTO: 0 /100 WBC
PHOSPHATE SERPL-MCNC: 3.7 MG/DL (ref 2.7–4.5)
PLATELET # BLD AUTO: 244 K/UL (ref 150–450)
PMV BLD AUTO: 9.9 FL (ref 9.2–12.9)
POCT GLUCOSE: 91 MG/DL (ref 70–110)
POTASSIUM SERPL-SCNC: 4 MMOL/L (ref 3.5–5.1)
PROT SERPL-MCNC: 5.3 G/DL (ref 6–8.4)
RBC # BLD AUTO: 3.25 M/UL (ref 4–5.4)
SODIUM SERPL-SCNC: 140 MMOL/L (ref 136–145)
WBC # BLD AUTO: 10.45 K/UL (ref 3.9–12.7)

## 2021-08-19 PROCEDURE — 99239 HOSP IP/OBS DSCHRG MGMT >30: CPT | Mod: ,,, | Performed by: HOSPITALIST

## 2021-08-19 PROCEDURE — 85025 COMPLETE CBC W/AUTO DIFF WBC: CPT | Performed by: HOSPITALIST

## 2021-08-19 PROCEDURE — 36415 COLL VENOUS BLD VENIPUNCTURE: CPT | Performed by: HOSPITALIST

## 2021-08-19 PROCEDURE — 25000003 PHARM REV CODE 250: Performed by: STUDENT IN AN ORGANIZED HEALTH CARE EDUCATION/TRAINING PROGRAM

## 2021-08-19 PROCEDURE — 80053 COMPREHEN METABOLIC PANEL: CPT | Performed by: HOSPITALIST

## 2021-08-19 PROCEDURE — 25000003 PHARM REV CODE 250: Performed by: HOSPITALIST

## 2021-08-19 PROCEDURE — 97116 GAIT TRAINING THERAPY: CPT | Mod: CQ

## 2021-08-19 PROCEDURE — 97535 SELF CARE MNGMENT TRAINING: CPT

## 2021-08-19 PROCEDURE — 83735 ASSAY OF MAGNESIUM: CPT | Performed by: HOSPITALIST

## 2021-08-19 PROCEDURE — 84100 ASSAY OF PHOSPHORUS: CPT | Performed by: HOSPITALIST

## 2021-08-19 PROCEDURE — 99239 PR HOSPITAL DISCHARGE DAY,>30 MIN: ICD-10-PCS | Mod: ,,, | Performed by: HOSPITALIST

## 2021-08-19 RX ORDER — ALPRAZOLAM 1 MG/1
1 TABLET ORAL 2 TIMES DAILY PRN
Start: 2021-08-19

## 2021-08-19 RX ORDER — HYDROCODONE BITARTRATE AND ACETAMINOPHEN 10; 325 MG/1; MG/1
1 TABLET ORAL 4 TIMES DAILY PRN
Qty: 63 TABLET | Refills: 0 | Status: SHIPPED | OUTPATIENT
Start: 2021-08-19 | End: 2021-08-19 | Stop reason: SDUPTHER

## 2021-08-19 RX ORDER — CHOLECALCIFEROL (VITAMIN D3) 25 MCG
2000 TABLET ORAL DAILY
Qty: 60 TABLET | Refills: 1 | Status: SHIPPED | OUTPATIENT
Start: 2021-08-20 | End: 2021-11-01

## 2021-08-19 RX ORDER — CHOLECALCIFEROL (VITAMIN D3) 25 MCG
2000 TABLET ORAL DAILY
Start: 2021-08-20 | End: 2021-08-19

## 2021-08-19 RX ORDER — HYDROCODONE BITARTRATE AND ACETAMINOPHEN 10; 325 MG/1; MG/1
1 TABLET ORAL 4 TIMES DAILY PRN
Start: 2021-08-19

## 2021-08-19 RX ORDER — METHIMAZOLE 10 MG/1
10 TABLET ORAL DAILY
Start: 2021-08-20 | End: 2021-08-19

## 2021-08-19 RX ORDER — ALPRAZOLAM 1 MG/1
1 TABLET ORAL 2 TIMES DAILY PRN
Qty: 42 TABLET | Refills: 0 | Status: SHIPPED | OUTPATIENT
Start: 2021-08-19 | End: 2021-08-19 | Stop reason: SDUPTHER

## 2021-08-19 RX ORDER — METHIMAZOLE 10 MG/1
10 TABLET ORAL DAILY
Qty: 30 TABLET | Refills: 1 | Status: SHIPPED | OUTPATIENT
Start: 2021-08-20 | End: 2021-11-03 | Stop reason: SDUPTHER

## 2021-08-19 RX ORDER — PROPRANOLOL HYDROCHLORIDE 20 MG/1
20 TABLET ORAL 3 TIMES DAILY
Qty: 90 TABLET | Refills: 1 | Status: SHIPPED | OUTPATIENT
Start: 2021-08-19 | End: 2023-09-11

## 2021-08-19 RX ORDER — PROPRANOLOL HYDROCHLORIDE 20 MG/1
20 TABLET ORAL 3 TIMES DAILY
Start: 2021-08-19 | End: 2021-08-19

## 2021-08-19 RX ADMIN — Medication 2000 UNITS: at 08:08

## 2021-08-19 RX ADMIN — VANCOMYCIN HYDROCHLORIDE 125 MG: KIT at 09:08

## 2021-08-19 RX ADMIN — METHIMAZOLE 10 MG: 5 TABLET ORAL at 08:08

## 2021-08-19 RX ADMIN — PAROXETINE 40 MG: 10 TABLET, FILM COATED ORAL at 08:08

## 2021-08-19 RX ADMIN — VANCOMYCIN HYDROCHLORIDE 125 MG: KIT at 03:08

## 2021-08-19 RX ADMIN — PROPRANOLOL HYDROCHLORIDE 20 MG: 10 TABLET ORAL at 08:08

## 2021-08-19 RX ADMIN — ATORVASTATIN CALCIUM 40 MG: 20 TABLET, FILM COATED ORAL at 08:08

## 2021-08-19 RX ADMIN — LISINOPRIL 10 MG: 10 TABLET ORAL at 08:08

## 2021-08-19 RX ADMIN — AMLODIPINE BESYLATE 10 MG: 10 TABLET ORAL at 08:08

## 2021-08-20 ENCOUNTER — NURSE TRIAGE (OUTPATIENT)
Dept: ADMINISTRATIVE | Facility: CLINIC | Age: 61
End: 2021-08-20

## 2021-08-20 ENCOUNTER — PATIENT OUTREACH (OUTPATIENT)
Dept: ADMINISTRATIVE | Facility: CLINIC | Age: 61
End: 2021-08-20

## 2021-08-20 DIAGNOSIS — E05.00 GRAVES DISEASE: Primary | ICD-10-CM

## 2021-08-25 ENCOUNTER — OFFICE VISIT (OUTPATIENT)
Dept: HOME HEALTH SERVICES | Facility: CLINIC | Age: 61
End: 2021-08-25
Payer: MEDICAID

## 2021-08-25 VITALS
SYSTOLIC BLOOD PRESSURE: 132 MMHG | OXYGEN SATURATION: 97 % | RESPIRATION RATE: 18 BRPM | DIASTOLIC BLOOD PRESSURE: 70 MMHG | HEART RATE: 70 BPM

## 2021-08-25 DIAGNOSIS — I10 ESSENTIAL HYPERTENSION: ICD-10-CM

## 2021-08-25 DIAGNOSIS — A04.71 RECURRENT CLOSTRIDIOIDES DIFFICILE DIARRHEA: ICD-10-CM

## 2021-08-25 DIAGNOSIS — E05.00 GRAVES DISEASE: Primary | ICD-10-CM

## 2021-08-25 PROCEDURE — 99350 HOME/RES VST EST HIGH MDM 60: CPT | Mod: S$GLB,,, | Performed by: NURSE PRACTITIONER

## 2021-08-25 PROCEDURE — 99350 PR HOME VISIT,ESTAB PATIENT,LEVEL IV: ICD-10-PCS | Mod: S$GLB,,, | Performed by: NURSE PRACTITIONER

## 2021-09-10 ENCOUNTER — CARE AT HOME (OUTPATIENT)
Dept: HOME HEALTH SERVICES | Facility: CLINIC | Age: 61
End: 2021-09-10
Payer: MEDICAID

## 2021-09-10 DIAGNOSIS — N39.0 URINARY TRACT INFECTION WITHOUT HEMATURIA, SITE UNSPECIFIED: Primary | ICD-10-CM

## 2021-09-10 PROCEDURE — 99213 PR OFFICE/OUTPT VISIT, EST, LEVL III, 20-29 MIN: ICD-10-PCS | Mod: 95,,, | Performed by: NURSE PRACTITIONER

## 2021-09-10 PROCEDURE — 99213 OFFICE O/P EST LOW 20 MIN: CPT | Mod: 95,,, | Performed by: NURSE PRACTITIONER

## 2021-09-10 RX ORDER — CEPHALEXIN 500 MG/1
500 CAPSULE ORAL EVERY 8 HOURS
Qty: 21 CAPSULE | Refills: 0 | Status: SHIPPED | OUTPATIENT
Start: 2021-09-10 | End: 2021-09-17

## 2021-09-20 ENCOUNTER — CARE AT HOME (OUTPATIENT)
Dept: HOME HEALTH SERVICES | Facility: CLINIC | Age: 61
End: 2021-09-20
Payer: MEDICAID

## 2021-09-20 VITALS
OXYGEN SATURATION: 97 % | SYSTOLIC BLOOD PRESSURE: 120 MMHG | HEART RATE: 72 BPM | RESPIRATION RATE: 18 BRPM | DIASTOLIC BLOOD PRESSURE: 62 MMHG

## 2021-09-20 DIAGNOSIS — A04.71 RECURRENT CLOSTRIDIOIDES DIFFICILE DIARRHEA: Primary | ICD-10-CM

## 2021-09-20 DIAGNOSIS — R63.4 WEIGHT LOSS: ICD-10-CM

## 2021-09-20 DIAGNOSIS — E05.00 GRAVES DISEASE: ICD-10-CM

## 2021-09-20 PROCEDURE — 99350 PR HOME VISIT,ESTAB PATIENT,LEVEL IV: ICD-10-PCS | Mod: S$GLB,,, | Performed by: NURSE PRACTITIONER

## 2021-09-20 PROCEDURE — 99350 HOME/RES VST EST HIGH MDM 60: CPT | Mod: S$GLB,,, | Performed by: NURSE PRACTITIONER

## 2021-09-21 ENCOUNTER — TELEPHONE (OUTPATIENT)
Dept: HOME HEALTH SERVICES | Facility: CLINIC | Age: 61
End: 2021-09-21

## 2021-09-22 ENCOUNTER — OFFICE VISIT (OUTPATIENT)
Dept: INFECTIOUS DISEASES | Facility: CLINIC | Age: 61
DRG: 373 | End: 2021-09-22
Payer: MEDICAID

## 2021-09-22 ENCOUNTER — HOSPITAL ENCOUNTER (INPATIENT)
Facility: HOSPITAL | Age: 61
LOS: 3 days | Discharge: HOME | DRG: 373 | End: 2021-09-25
Attending: EMERGENCY MEDICINE | Admitting: HOSPITALIST
Payer: MEDICAID

## 2021-09-22 VITALS
HEART RATE: 76 BPM | HEIGHT: 64 IN | DIASTOLIC BLOOD PRESSURE: 58 MMHG | BODY MASS INDEX: 23.29 KG/M2 | SYSTOLIC BLOOD PRESSURE: 93 MMHG | TEMPERATURE: 98 F | WEIGHT: 136.44 LBS

## 2021-09-22 DIAGNOSIS — A04.72 C. DIFFICILE COLITIS: Primary | ICD-10-CM

## 2021-09-22 DIAGNOSIS — E16.2 HYPOGLYCEMIA: ICD-10-CM

## 2021-09-22 DIAGNOSIS — I95.9 HYPOTENSION, UNSPECIFIED HYPOTENSION TYPE: ICD-10-CM

## 2021-09-22 DIAGNOSIS — A04.71 RECURRENT CLOSTRIDIOIDES DIFFICILE DIARRHEA: ICD-10-CM

## 2021-09-22 DIAGNOSIS — E05.00 GRAVES DISEASE: Primary | ICD-10-CM

## 2021-09-22 DIAGNOSIS — D72.829 LEUKOCYTOSIS, UNSPECIFIED TYPE: ICD-10-CM

## 2021-09-22 DIAGNOSIS — I95.9 HYPOTENSION: ICD-10-CM

## 2021-09-22 PROBLEM — R00.0 TACHYCARDIA: Status: RESOLVED | Noted: 2021-08-08 | Resolved: 2021-09-22

## 2021-09-22 PROBLEM — E87.6 HYPOKALEMIA: Status: ACTIVE | Noted: 2021-09-22

## 2021-09-22 LAB
ALBUMIN SERPL BCP-MCNC: 2.9 G/DL (ref 3.5–5.2)
ALP SERPL-CCNC: 125 U/L (ref 55–135)
ALT SERPL W/O P-5'-P-CCNC: 36 U/L (ref 10–44)
ANION GAP SERPL CALC-SCNC: 15 MMOL/L (ref 8–16)
AST SERPL-CCNC: 24 U/L (ref 10–40)
BACTERIA #/AREA URNS AUTO: ABNORMAL /HPF
BASOPHILS # BLD AUTO: 0.11 K/UL (ref 0–0.2)
BASOPHILS NFR BLD: 0.5 % (ref 0–1.9)
BILIRUB SERPL-MCNC: 0.2 MG/DL (ref 0.1–1)
BILIRUB UR QL STRIP: NEGATIVE
BNP SERPL-MCNC: 29 PG/ML (ref 0–99)
BUN SERPL-MCNC: 12 MG/DL (ref 6–30)
BUN SERPL-MCNC: 14 MG/DL (ref 6–20)
CALCIUM SERPL-MCNC: 8.6 MG/DL (ref 8.7–10.5)
CHLORIDE SERPL-SCNC: 103 MMOL/L (ref 95–110)
CHLORIDE SERPL-SCNC: 99 MMOL/L (ref 95–110)
CLARITY UR REFRACT.AUTO: ABNORMAL
CO2 SERPL-SCNC: 19 MMOL/L (ref 23–29)
COLOR UR AUTO: YELLOW
CREAT SERPL-MCNC: 0.7 MG/DL (ref 0.5–1.4)
CREAT SERPL-MCNC: 0.8 MG/DL (ref 0.5–1.4)
CRP SERPL-MCNC: 11.5 MG/L (ref 0–8.2)
CTP QC/QA: YES
DIFFERENTIAL METHOD: ABNORMAL
EOSINOPHIL # BLD AUTO: 0.5 K/UL (ref 0–0.5)
EOSINOPHIL NFR BLD: 2.6 % (ref 0–8)
ERYTHROCYTE [DISTWIDTH] IN BLOOD BY AUTOMATED COUNT: 15.3 % (ref 11.5–14.5)
EST. GFR  (AFRICAN AMERICAN): >60 ML/MIN/1.73 M^2
EST. GFR  (NON AFRICAN AMERICAN): >60 ML/MIN/1.73 M^2
GLUCOSE SERPL-MCNC: 49 MG/DL (ref 70–110)
GLUCOSE SERPL-MCNC: 75 MG/DL (ref 70–110)
GLUCOSE UR QL STRIP: ABNORMAL
HCT VFR BLD AUTO: 31.8 % (ref 37–48.5)
HCT VFR BLD CALC: 27 %PCV (ref 36–54)
HGB BLD-MCNC: 10.6 G/DL (ref 12–16)
HGB UR QL STRIP: NEGATIVE
HYALINE CASTS UR QL AUTO: 1 /LPF
IMM GRANULOCYTES # BLD AUTO: 0.7 K/UL (ref 0–0.04)
IMM GRANULOCYTES NFR BLD AUTO: 3.3 % (ref 0–0.5)
KETONES UR QL STRIP: NEGATIVE
LACTATE SERPL-SCNC: 1.7 MMOL/L (ref 0.5–2.2)
LEUKOCYTE ESTERASE UR QL STRIP: ABNORMAL
LYMPHOCYTES # BLD AUTO: 3.3 K/UL (ref 1–4.8)
LYMPHOCYTES NFR BLD: 15.6 % (ref 18–48)
MAGNESIUM SERPL-MCNC: 1.6 MG/DL (ref 1.6–2.6)
MCH RBC QN AUTO: 27.8 PG (ref 27–31)
MCHC RBC AUTO-ENTMCNC: 33.3 G/DL (ref 32–36)
MCV RBC AUTO: 84 FL (ref 82–98)
MICROSCOPIC COMMENT: ABNORMAL
MONOCYTES # BLD AUTO: 1 K/UL (ref 0.3–1)
MONOCYTES NFR BLD: 4.8 % (ref 4–15)
NEUTROPHILS # BLD AUTO: 15.4 K/UL (ref 1.8–7.7)
NEUTROPHILS NFR BLD: 73.2 % (ref 38–73)
NITRITE UR QL STRIP: NEGATIVE
NRBC BLD-RTO: 0 /100 WBC
PH UR STRIP: 6 [PH] (ref 5–8)
PHOSPHATE SERPL-MCNC: 4.8 MG/DL (ref 2.7–4.5)
PLATELET # BLD AUTO: 424 K/UL (ref 150–450)
PMV BLD AUTO: 9.4 FL (ref 9.2–12.9)
POC IONIZED CALCIUM: 1.14 MMOL/L (ref 1.06–1.42)
POC TCO2 (MEASURED): 23 MMOL/L (ref 23–29)
POTASSIUM BLD-SCNC: 2.6 MMOL/L (ref 3.5–5.1)
POTASSIUM SERPL-SCNC: 3.2 MMOL/L (ref 3.5–5.1)
PROCALCITONIN SERPL IA-MCNC: 0.07 NG/ML
PROT SERPL-MCNC: 6.6 G/DL (ref 6–8.4)
PROT UR QL STRIP: NEGATIVE
RBC # BLD AUTO: 3.81 M/UL (ref 4–5.4)
RBC #/AREA URNS AUTO: 4 /HPF (ref 0–4)
SAMPLE: ABNORMAL
SARS-COV-2 RDRP RESP QL NAA+PROBE: NEGATIVE
SODIUM BLD-SCNC: 136 MMOL/L (ref 136–145)
SODIUM SERPL-SCNC: 137 MMOL/L (ref 136–145)
SP GR UR STRIP: 1.01 (ref 1–1.03)
SQUAMOUS #/AREA URNS AUTO: 3 /HPF
URN SPEC COLLECT METH UR: ABNORMAL
WBC # BLD AUTO: 21.03 K/UL (ref 3.9–12.7)
WBC #/AREA URNS AUTO: 16 /HPF (ref 0–5)

## 2021-09-22 PROCEDURE — 83605 ASSAY OF LACTIC ACID: CPT | Performed by: EMERGENCY MEDICINE

## 2021-09-22 PROCEDURE — 96367 TX/PROPH/DG ADDL SEQ IV INF: CPT

## 2021-09-22 PROCEDURE — 84145 PROCALCITONIN (PCT): CPT | Performed by: EMERGENCY MEDICINE

## 2021-09-22 PROCEDURE — 99999 PR PBB SHADOW E&M-EST. PATIENT-LVL III: ICD-10-PCS | Mod: PBBFAC,,, | Performed by: STUDENT IN AN ORGANIZED HEALTH CARE EDUCATION/TRAINING PROGRAM

## 2021-09-22 PROCEDURE — 84100 ASSAY OF PHOSPHORUS: CPT | Performed by: EMERGENCY MEDICINE

## 2021-09-22 PROCEDURE — 99215 OFFICE O/P EST HI 40 MIN: CPT | Mod: S$PBB,,, | Performed by: STUDENT IN AN ORGANIZED HEALTH CARE EDUCATION/TRAINING PROGRAM

## 2021-09-22 PROCEDURE — 96361 HYDRATE IV INFUSION ADD-ON: CPT

## 2021-09-22 PROCEDURE — 99291 PR CRITICAL CARE, E/M 30-74 MINUTES: ICD-10-PCS | Mod: CS,,, | Performed by: EMERGENCY MEDICINE

## 2021-09-22 PROCEDURE — 85025 COMPLETE CBC W/AUTO DIFF WBC: CPT | Performed by: EMERGENCY MEDICINE

## 2021-09-22 PROCEDURE — 86803 HEPATITIS C AB TEST: CPT | Performed by: EMERGENCY MEDICINE

## 2021-09-22 PROCEDURE — 81001 URINALYSIS AUTO W/SCOPE: CPT | Performed by: EMERGENCY MEDICINE

## 2021-09-22 PROCEDURE — 25000003 PHARM REV CODE 250: Performed by: EMERGENCY MEDICINE

## 2021-09-22 PROCEDURE — 27000207 HC ISOLATION

## 2021-09-22 PROCEDURE — 99291 CRITICAL CARE FIRST HOUR: CPT | Mod: CS,,, | Performed by: EMERGENCY MEDICINE

## 2021-09-22 PROCEDURE — 93005 ELECTROCARDIOGRAM TRACING: CPT

## 2021-09-22 PROCEDURE — 96365 THER/PROPH/DIAG IV INF INIT: CPT

## 2021-09-22 PROCEDURE — 99291 CRITICAL CARE FIRST HOUR: CPT | Mod: 25

## 2021-09-22 PROCEDURE — 20600001 HC STEP DOWN PRIVATE ROOM

## 2021-09-22 PROCEDURE — 99999 PR PBB SHADOW E&M-EST. PATIENT-LVL III: CPT | Mod: PBBFAC,,, | Performed by: STUDENT IN AN ORGANIZED HEALTH CARE EDUCATION/TRAINING PROGRAM

## 2021-09-22 PROCEDURE — 99213 OFFICE O/P EST LOW 20 MIN: CPT | Mod: PBBFAC,25 | Performed by: STUDENT IN AN ORGANIZED HEALTH CARE EDUCATION/TRAINING PROGRAM

## 2021-09-22 PROCEDURE — 99223 PR INITIAL HOSPITAL CARE,LEVL III: ICD-10-PCS | Mod: ,,, | Performed by: HOSPITALIST

## 2021-09-22 PROCEDURE — 63600175 PHARM REV CODE 636 W HCPCS: Performed by: EMERGENCY MEDICINE

## 2021-09-22 PROCEDURE — 87040 BLOOD CULTURE FOR BACTERIA: CPT | Performed by: EMERGENCY MEDICINE

## 2021-09-22 PROCEDURE — 25000003 PHARM REV CODE 250: Performed by: HOSPITALIST

## 2021-09-22 PROCEDURE — 80053 COMPREHEN METABOLIC PANEL: CPT | Performed by: EMERGENCY MEDICINE

## 2021-09-22 PROCEDURE — 83735 ASSAY OF MAGNESIUM: CPT | Performed by: EMERGENCY MEDICINE

## 2021-09-22 PROCEDURE — 87086 URINE CULTURE/COLONY COUNT: CPT | Performed by: EMERGENCY MEDICINE

## 2021-09-22 PROCEDURE — 99223 1ST HOSP IP/OBS HIGH 75: CPT | Mod: ,,, | Performed by: HOSPITALIST

## 2021-09-22 PROCEDURE — 83880 ASSAY OF NATRIURETIC PEPTIDE: CPT | Performed by: EMERGENCY MEDICINE

## 2021-09-22 PROCEDURE — 86140 C-REACTIVE PROTEIN: CPT | Performed by: EMERGENCY MEDICINE

## 2021-09-22 PROCEDURE — 99215 PR OFFICE/OUTPT VISIT, EST, LEVL V, 40-54 MIN: ICD-10-PCS | Mod: S$PBB,,, | Performed by: STUDENT IN AN ORGANIZED HEALTH CARE EDUCATION/TRAINING PROGRAM

## 2021-09-22 PROCEDURE — 80047 BASIC METABLC PNL IONIZED CA: CPT

## 2021-09-22 PROCEDURE — U0002 COVID-19 LAB TEST NON-CDC: HCPCS | Performed by: EMERGENCY MEDICINE

## 2021-09-22 RX ORDER — IBUPROFEN 200 MG
16 TABLET ORAL
Status: DISCONTINUED | OUTPATIENT
Start: 2021-09-22 | End: 2021-09-25 | Stop reason: HOSPADM

## 2021-09-22 RX ORDER — AMITRIPTYLINE HYDROCHLORIDE 50 MG/1
150 TABLET, FILM COATED ORAL NIGHTLY
Status: DISCONTINUED | OUTPATIENT
Start: 2021-09-22 | End: 2021-09-25 | Stop reason: HOSPADM

## 2021-09-22 RX ORDER — IBUPROFEN 200 MG
24 TABLET ORAL
Status: DISCONTINUED | OUTPATIENT
Start: 2021-09-22 | End: 2021-09-25 | Stop reason: HOSPADM

## 2021-09-22 RX ORDER — ONDANSETRON 2 MG/ML
8 INJECTION INTRAMUSCULAR; INTRAVENOUS EVERY 6 HOURS PRN
Status: DISCONTINUED | OUTPATIENT
Start: 2021-09-22 | End: 2021-09-25 | Stop reason: HOSPADM

## 2021-09-22 RX ORDER — VANCOMYCIN HCL IN 5 % DEXTROSE 1G/250ML
15 PLASTIC BAG, INJECTION (ML) INTRAVENOUS
Status: COMPLETED | OUTPATIENT
Start: 2021-09-22 | End: 2021-09-22

## 2021-09-22 RX ORDER — ATORVASTATIN CALCIUM 20 MG/1
40 TABLET, FILM COATED ORAL DAILY
Status: DISCONTINUED | OUTPATIENT
Start: 2021-09-23 | End: 2021-09-25 | Stop reason: HOSPADM

## 2021-09-22 RX ORDER — GLUCAGON 1 MG
1 KIT INJECTION
Status: DISCONTINUED | OUTPATIENT
Start: 2021-09-22 | End: 2021-09-25 | Stop reason: HOSPADM

## 2021-09-22 RX ORDER — ACETAMINOPHEN 325 MG/1
650 TABLET ORAL EVERY 4 HOURS PRN
Status: DISCONTINUED | OUTPATIENT
Start: 2021-09-22 | End: 2021-09-25 | Stop reason: HOSPADM

## 2021-09-22 RX ORDER — HYDROCODONE BITARTRATE AND ACETAMINOPHEN 5; 325 MG/1; MG/1
1 TABLET ORAL EVERY 4 HOURS PRN
Status: DISCONTINUED | OUTPATIENT
Start: 2021-09-22 | End: 2021-09-25 | Stop reason: HOSPADM

## 2021-09-22 RX ORDER — PAROXETINE 10 MG/1
40 TABLET, FILM COATED ORAL DAILY
Status: DISCONTINUED | OUTPATIENT
Start: 2021-09-23 | End: 2021-09-25 | Stop reason: HOSPADM

## 2021-09-22 RX ORDER — POTASSIUM CHLORIDE 20 MEQ/1
40 TABLET, EXTENDED RELEASE ORAL ONCE
Status: COMPLETED | OUTPATIENT
Start: 2021-09-22 | End: 2021-09-22

## 2021-09-22 RX ORDER — SODIUM CHLORIDE 0.9 % (FLUSH) 0.9 %
5 SYRINGE (ML) INJECTION
Status: DISCONTINUED | OUTPATIENT
Start: 2021-09-22 | End: 2021-09-25 | Stop reason: HOSPADM

## 2021-09-22 RX ORDER — METHIMAZOLE 10 MG/1
10 TABLET ORAL DAILY
Status: DISCONTINUED | OUTPATIENT
Start: 2021-09-23 | End: 2021-09-25 | Stop reason: HOSPADM

## 2021-09-22 RX ORDER — PROCHLORPERAZINE EDISYLATE 5 MG/ML
5 INJECTION INTRAMUSCULAR; INTRAVENOUS EVERY 6 HOURS PRN
Status: DISCONTINUED | OUTPATIENT
Start: 2021-09-22 | End: 2021-09-25 | Stop reason: HOSPADM

## 2021-09-22 RX ORDER — TALC
6 POWDER (GRAM) TOPICAL NIGHTLY PRN
Status: DISCONTINUED | OUTPATIENT
Start: 2021-09-22 | End: 2021-09-25 | Stop reason: HOSPADM

## 2021-09-22 RX ORDER — HYDROCODONE BITARTRATE AND ACETAMINOPHEN 10; 325 MG/1; MG/1
1 TABLET ORAL EVERY 4 HOURS PRN
Status: DISCONTINUED | OUTPATIENT
Start: 2021-09-22 | End: 2021-09-25 | Stop reason: HOSPADM

## 2021-09-22 RX ADMIN — POTASSIUM CHLORIDE 40 MEQ: 1500 TABLET, EXTENDED RELEASE ORAL at 10:09

## 2021-09-22 RX ADMIN — PIPERACILLIN SODIUM AND TAZOBACTAM SODIUM 4.5 G: 4; .5 INJECTION, POWDER, FOR SOLUTION INTRAVENOUS at 05:09

## 2021-09-22 RX ADMIN — MELATONIN TAB 3 MG 6 MG: 3 TAB at 10:09

## 2021-09-22 RX ADMIN — AMITRIPTYLINE HYDROCHLORIDE 150 MG: 50 TABLET, FILM COATED ORAL at 10:09

## 2021-09-22 RX ADMIN — VANCOMYCIN HYDROCHLORIDE 1000 MG: 1 INJECTION, POWDER, LYOPHILIZED, FOR SOLUTION INTRAVENOUS at 05:09

## 2021-09-23 LAB
ALBUMIN SERPL BCP-MCNC: 2.2 G/DL (ref 3.5–5.2)
ALP SERPL-CCNC: 103 U/L (ref 55–135)
ALT SERPL W/O P-5'-P-CCNC: 28 U/L (ref 10–44)
ANION GAP SERPL CALC-SCNC: 8 MMOL/L (ref 8–16)
AST SERPL-CCNC: 18 U/L (ref 10–40)
BACTERIA UR CULT: NORMAL
BASOPHILS # BLD AUTO: 0.06 K/UL (ref 0–0.2)
BASOPHILS NFR BLD: 0.4 % (ref 0–1.9)
BILIRUB SERPL-MCNC: 0.2 MG/DL (ref 0.1–1)
BUN SERPL-MCNC: 11 MG/DL (ref 6–20)
CALCIUM SERPL-MCNC: 8 MG/DL (ref 8.7–10.5)
CHLORIDE SERPL-SCNC: 108 MMOL/L (ref 95–110)
CO2 SERPL-SCNC: 23 MMOL/L (ref 23–29)
CREAT SERPL-MCNC: 0.6 MG/DL (ref 0.5–1.4)
DIFFERENTIAL METHOD: ABNORMAL
EOSINOPHIL # BLD AUTO: 0.4 K/UL (ref 0–0.5)
EOSINOPHIL NFR BLD: 3 % (ref 0–8)
ERYTHROCYTE [DISTWIDTH] IN BLOOD BY AUTOMATED COUNT: 14.6 % (ref 11.5–14.5)
EST. GFR  (AFRICAN AMERICAN): >60 ML/MIN/1.73 M^2
EST. GFR  (NON AFRICAN AMERICAN): >60 ML/MIN/1.73 M^2
GLUCOSE SERPL-MCNC: 104 MG/DL (ref 70–110)
HCT VFR BLD AUTO: 27.7 % (ref 37–48.5)
HCV AB SERPL QL IA: NEGATIVE
HGB BLD-MCNC: 9.4 G/DL (ref 12–16)
IMM GRANULOCYTES # BLD AUTO: 0.22 K/UL (ref 0–0.04)
IMM GRANULOCYTES NFR BLD AUTO: 1.5 % (ref 0–0.5)
LYMPHOCYTES # BLD AUTO: 1.8 K/UL (ref 1–4.8)
LYMPHOCYTES NFR BLD: 12.5 % (ref 18–48)
MAGNESIUM SERPL-MCNC: 1.5 MG/DL (ref 1.6–2.6)
MCH RBC QN AUTO: 28 PG (ref 27–31)
MCHC RBC AUTO-ENTMCNC: 33.9 G/DL (ref 32–36)
MCV RBC AUTO: 82 FL (ref 82–98)
MONOCYTES # BLD AUTO: 0.8 K/UL (ref 0.3–1)
MONOCYTES NFR BLD: 5.5 % (ref 4–15)
NEUTROPHILS # BLD AUTO: 11.1 K/UL (ref 1.8–7.7)
NEUTROPHILS NFR BLD: 77.1 % (ref 38–73)
NRBC BLD-RTO: 0 /100 WBC
PHOSPHATE SERPL-MCNC: 3.8 MG/DL (ref 2.7–4.5)
PLATELET # BLD AUTO: 321 K/UL (ref 150–450)
PMV BLD AUTO: 9.5 FL (ref 9.2–12.9)
POCT GLUCOSE: 111 MG/DL (ref 70–110)
POCT GLUCOSE: 122 MG/DL (ref 70–110)
POCT GLUCOSE: 131 MG/DL (ref 70–110)
POCT GLUCOSE: 145 MG/DL (ref 70–110)
POCT GLUCOSE: 80 MG/DL (ref 70–110)
POTASSIUM SERPL-SCNC: 3.6 MMOL/L (ref 3.5–5.1)
PROT SERPL-MCNC: 5.2 G/DL (ref 6–8.4)
RBC # BLD AUTO: 3.36 M/UL (ref 4–5.4)
SODIUM SERPL-SCNC: 139 MMOL/L (ref 136–145)
WBC # BLD AUTO: 14.34 K/UL (ref 3.9–12.7)

## 2021-09-23 PROCEDURE — 20600001 HC STEP DOWN PRIVATE ROOM

## 2021-09-23 PROCEDURE — 80053 COMPREHEN METABOLIC PANEL: CPT | Performed by: HOSPITALIST

## 2021-09-23 PROCEDURE — 87324 CLOSTRIDIUM AG IA: CPT | Performed by: EMERGENCY MEDICINE

## 2021-09-23 PROCEDURE — 84100 ASSAY OF PHOSPHORUS: CPT | Performed by: HOSPITALIST

## 2021-09-23 PROCEDURE — 63600175 PHARM REV CODE 636 W HCPCS: Performed by: STUDENT IN AN ORGANIZED HEALTH CARE EDUCATION/TRAINING PROGRAM

## 2021-09-23 PROCEDURE — 99232 PR SUBSEQUENT HOSPITAL CARE,LEVL II: ICD-10-PCS | Mod: ,,, | Performed by: STUDENT IN AN ORGANIZED HEALTH CARE EDUCATION/TRAINING PROGRAM

## 2021-09-23 PROCEDURE — 27000207 HC ISOLATION

## 2021-09-23 PROCEDURE — 63600175 PHARM REV CODE 636 W HCPCS: Performed by: HOSPITALIST

## 2021-09-23 PROCEDURE — 25000003 PHARM REV CODE 250: Performed by: STUDENT IN AN ORGANIZED HEALTH CARE EDUCATION/TRAINING PROGRAM

## 2021-09-23 PROCEDURE — 87507 IADNA-DNA/RNA PROBE TQ 12-25: CPT | Performed by: HOSPITALIST

## 2021-09-23 PROCEDURE — 25000003 PHARM REV CODE 250: Performed by: FAMILY MEDICINE

## 2021-09-23 PROCEDURE — 99232 SBSQ HOSP IP/OBS MODERATE 35: CPT | Mod: ,,, | Performed by: STUDENT IN AN ORGANIZED HEALTH CARE EDUCATION/TRAINING PROGRAM

## 2021-09-23 PROCEDURE — 36415 COLL VENOUS BLD VENIPUNCTURE: CPT | Performed by: HOSPITALIST

## 2021-09-23 PROCEDURE — 83735 ASSAY OF MAGNESIUM: CPT | Performed by: HOSPITALIST

## 2021-09-23 PROCEDURE — 85025 COMPLETE CBC W/AUTO DIFF WBC: CPT | Performed by: HOSPITALIST

## 2021-09-23 PROCEDURE — 87449 NOS EACH ORGANISM AG IA: CPT | Performed by: EMERGENCY MEDICINE

## 2021-09-23 PROCEDURE — 25000003 PHARM REV CODE 250: Performed by: HOSPITALIST

## 2021-09-23 RX ORDER — MUPIROCIN 20 MG/G
OINTMENT TOPICAL 2 TIMES DAILY
Status: DISCONTINUED | OUTPATIENT
Start: 2021-09-23 | End: 2021-09-25 | Stop reason: HOSPADM

## 2021-09-23 RX ORDER — DEXTROSE MONOHYDRATE, SODIUM CHLORIDE, AND POTASSIUM CHLORIDE 50; 1.49; 4.5 G/1000ML; G/1000ML; G/1000ML
INJECTION, SOLUTION INTRAVENOUS CONTINUOUS
Status: DISPENSED | OUTPATIENT
Start: 2021-09-23 | End: 2021-09-23

## 2021-09-23 RX ORDER — MAGNESIUM SULFATE HEPTAHYDRATE 40 MG/ML
2 INJECTION, SOLUTION INTRAVENOUS
Status: COMPLETED | OUTPATIENT
Start: 2021-09-23 | End: 2021-09-23

## 2021-09-23 RX ADMIN — AMITRIPTYLINE HYDROCHLORIDE 150 MG: 50 TABLET, FILM COATED ORAL at 09:09

## 2021-09-23 RX ADMIN — DEXTROSE, SODIUM CHLORIDE, AND POTASSIUM CHLORIDE: 5; .45; .15 INJECTION INTRAVENOUS at 02:09

## 2021-09-23 RX ADMIN — MUPIROCIN: 20 OINTMENT TOPICAL at 09:09

## 2021-09-23 RX ADMIN — MELATONIN TAB 3 MG 6 MG: 3 TAB at 09:09

## 2021-09-23 RX ADMIN — ATORVASTATIN CALCIUM 40 MG: 20 TABLET, FILM COATED ORAL at 10:09

## 2021-09-23 RX ADMIN — PAROXETINE 40 MG: 10 TABLET, FILM COATED ORAL at 10:09

## 2021-09-23 RX ADMIN — MAGNESIUM SULFATE 2 G: 2 INJECTION INTRAVENOUS at 10:09

## 2021-09-23 RX ADMIN — MAGNESIUM SULFATE 2 G: 2 INJECTION INTRAVENOUS at 12:09

## 2021-09-23 RX ADMIN — MUPIROCIN: 20 OINTMENT TOPICAL at 10:09

## 2021-09-23 RX ADMIN — SODIUM CHLORIDE 1000 ML: 0.9 INJECTION, SOLUTION INTRAVENOUS at 12:09

## 2021-09-23 RX ADMIN — METHIMAZOLE 10 MG: 10 TABLET ORAL at 11:09

## 2021-09-24 LAB
ALBUMIN SERPL BCP-MCNC: 2.3 G/DL (ref 3.5–5.2)
ALP SERPL-CCNC: 118 U/L (ref 55–135)
ALT SERPL W/O P-5'-P-CCNC: 28 U/L (ref 10–44)
ANION GAP SERPL CALC-SCNC: 8 MMOL/L (ref 8–16)
AST SERPL-CCNC: 17 U/L (ref 10–40)
BASOPHILS # BLD AUTO: 0.05 K/UL (ref 0–0.2)
BASOPHILS NFR BLD: 0.4 % (ref 0–1.9)
BILIRUB SERPL-MCNC: 0.2 MG/DL (ref 0.1–1)
BUN SERPL-MCNC: 8 MG/DL (ref 6–20)
C DIFF GDH STL QL: POSITIVE
C DIFF TOX A+B STL QL IA: POSITIVE
CALCIUM SERPL-MCNC: 8.5 MG/DL (ref 8.7–10.5)
CHLORIDE SERPL-SCNC: 108 MMOL/L (ref 95–110)
CO2 SERPL-SCNC: 25 MMOL/L (ref 23–29)
CREAT SERPL-MCNC: 0.6 MG/DL (ref 0.5–1.4)
DIFFERENTIAL METHOD: ABNORMAL
EOSINOPHIL # BLD AUTO: 0.3 K/UL (ref 0–0.5)
EOSINOPHIL NFR BLD: 2.6 % (ref 0–8)
ERYTHROCYTE [DISTWIDTH] IN BLOOD BY AUTOMATED COUNT: 14.5 % (ref 11.5–14.5)
EST. GFR  (AFRICAN AMERICAN): >60 ML/MIN/1.73 M^2
EST. GFR  (NON AFRICAN AMERICAN): >60 ML/MIN/1.73 M^2
GLUCOSE SERPL-MCNC: 89 MG/DL (ref 70–110)
HCT VFR BLD AUTO: 27.9 % (ref 37–48.5)
HGB BLD-MCNC: 9.3 G/DL (ref 12–16)
IMM GRANULOCYTES # BLD AUTO: 0.19 K/UL (ref 0–0.04)
IMM GRANULOCYTES NFR BLD AUTO: 1.6 % (ref 0–0.5)
LYMPHOCYTES # BLD AUTO: 1.7 K/UL (ref 1–4.8)
LYMPHOCYTES NFR BLD: 14.3 % (ref 18–48)
MAGNESIUM SERPL-MCNC: 2.1 MG/DL (ref 1.6–2.6)
MCH RBC QN AUTO: 27.6 PG (ref 27–31)
MCHC RBC AUTO-ENTMCNC: 33.3 G/DL (ref 32–36)
MCV RBC AUTO: 83 FL (ref 82–98)
MONOCYTES # BLD AUTO: 0.6 K/UL (ref 0.3–1)
MONOCYTES NFR BLD: 5 % (ref 4–15)
NEUTROPHILS # BLD AUTO: 9.1 K/UL (ref 1.8–7.7)
NEUTROPHILS NFR BLD: 76.1 % (ref 38–73)
NRBC BLD-RTO: 0 /100 WBC
PHOSPHATE SERPL-MCNC: 3.5 MG/DL (ref 2.7–4.5)
PLATELET # BLD AUTO: 300 K/UL (ref 150–450)
PMV BLD AUTO: 9.3 FL (ref 9.2–12.9)
POCT GLUCOSE: 106 MG/DL (ref 70–110)
POCT GLUCOSE: 111 MG/DL (ref 70–110)
POCT GLUCOSE: 129 MG/DL (ref 70–110)
POCT GLUCOSE: 154 MG/DL (ref 70–110)
POTASSIUM SERPL-SCNC: 4.1 MMOL/L (ref 3.5–5.1)
PROT SERPL-MCNC: 5.5 G/DL (ref 6–8.4)
RBC # BLD AUTO: 3.37 M/UL (ref 4–5.4)
SODIUM SERPL-SCNC: 141 MMOL/L (ref 136–145)
WBC # BLD AUTO: 11.95 K/UL (ref 3.9–12.7)

## 2021-09-24 PROCEDURE — 25000003 PHARM REV CODE 250: Performed by: STUDENT IN AN ORGANIZED HEALTH CARE EDUCATION/TRAINING PROGRAM

## 2021-09-24 PROCEDURE — 27000207 HC ISOLATION

## 2021-09-24 PROCEDURE — 84100 ASSAY OF PHOSPHORUS: CPT | Performed by: HOSPITALIST

## 2021-09-24 PROCEDURE — 80053 COMPREHEN METABOLIC PANEL: CPT | Performed by: HOSPITALIST

## 2021-09-24 PROCEDURE — 36415 COLL VENOUS BLD VENIPUNCTURE: CPT | Performed by: HOSPITALIST

## 2021-09-24 PROCEDURE — 25000003 PHARM REV CODE 250: Performed by: HOSPITALIST

## 2021-09-24 PROCEDURE — 85025 COMPLETE CBC W/AUTO DIFF WBC: CPT | Performed by: HOSPITALIST

## 2021-09-24 PROCEDURE — 99232 PR SUBSEQUENT HOSPITAL CARE,LEVL II: ICD-10-PCS | Mod: ,,, | Performed by: STUDENT IN AN ORGANIZED HEALTH CARE EDUCATION/TRAINING PROGRAM

## 2021-09-24 PROCEDURE — 83735 ASSAY OF MAGNESIUM: CPT | Performed by: HOSPITALIST

## 2021-09-24 PROCEDURE — 99232 SBSQ HOSP IP/OBS MODERATE 35: CPT | Mod: ,,, | Performed by: STUDENT IN AN ORGANIZED HEALTH CARE EDUCATION/TRAINING PROGRAM

## 2021-09-24 PROCEDURE — 20600001 HC STEP DOWN PRIVATE ROOM

## 2021-09-24 RX ORDER — PROPRANOLOL HYDROCHLORIDE 20 MG/1
20 TABLET ORAL 3 TIMES DAILY
Status: DISCONTINUED | OUTPATIENT
Start: 2021-09-24 | End: 2021-09-25 | Stop reason: HOSPADM

## 2021-09-24 RX ADMIN — MUPIROCIN: 20 OINTMENT TOPICAL at 09:09

## 2021-09-24 RX ADMIN — METHIMAZOLE 10 MG: 10 TABLET ORAL at 12:09

## 2021-09-24 RX ADMIN — VANCOMYCIN HYDROCHLORIDE 125 MG: KIT at 11:09

## 2021-09-24 RX ADMIN — PROPRANOLOL HYDROCHLORIDE 20 MG: 20 TABLET ORAL at 10:09

## 2021-09-24 RX ADMIN — VANCOMYCIN HYDROCHLORIDE 125 MG: KIT at 06:09

## 2021-09-24 RX ADMIN — AMITRIPTYLINE HYDROCHLORIDE 150 MG: 50 TABLET, FILM COATED ORAL at 09:09

## 2021-09-24 RX ADMIN — MELATONIN TAB 3 MG 6 MG: 3 TAB at 09:09

## 2021-09-24 RX ADMIN — PROPRANOLOL HYDROCHLORIDE 20 MG: 20 TABLET ORAL at 06:09

## 2021-09-24 RX ADMIN — PAROXETINE 40 MG: 10 TABLET, FILM COATED ORAL at 09:09

## 2021-09-24 RX ADMIN — ATORVASTATIN CALCIUM 40 MG: 20 TABLET, FILM COATED ORAL at 09:09

## 2021-09-25 VITALS
HEIGHT: 63 IN | OXYGEN SATURATION: 98 % | SYSTOLIC BLOOD PRESSURE: 126 MMHG | DIASTOLIC BLOOD PRESSURE: 54 MMHG | WEIGHT: 133 LBS | HEART RATE: 60 BPM | RESPIRATION RATE: 16 BRPM | TEMPERATURE: 98 F | BODY MASS INDEX: 23.57 KG/M2

## 2021-09-25 LAB
ANION GAP SERPL CALC-SCNC: 9 MMOL/L (ref 8–16)
BASOPHILS # BLD AUTO: 0.04 K/UL (ref 0–0.2)
BASOPHILS NFR BLD: 0.3 % (ref 0–1.9)
BUN SERPL-MCNC: 11 MG/DL (ref 6–20)
CALCIUM SERPL-MCNC: 8.7 MG/DL (ref 8.7–10.5)
CHLORIDE SERPL-SCNC: 107 MMOL/L (ref 95–110)
CO2 SERPL-SCNC: 25 MMOL/L (ref 23–29)
CREAT SERPL-MCNC: 0.6 MG/DL (ref 0.5–1.4)
DIFFERENTIAL METHOD: ABNORMAL
EOSINOPHIL # BLD AUTO: 0.3 K/UL (ref 0–0.5)
EOSINOPHIL NFR BLD: 2.9 % (ref 0–8)
ERYTHROCYTE [DISTWIDTH] IN BLOOD BY AUTOMATED COUNT: 14.6 % (ref 11.5–14.5)
EST. GFR  (AFRICAN AMERICAN): >60 ML/MIN/1.73 M^2
EST. GFR  (NON AFRICAN AMERICAN): >60 ML/MIN/1.73 M^2
GLUCOSE SERPL-MCNC: 101 MG/DL (ref 70–110)
HCT VFR BLD AUTO: 30.5 % (ref 37–48.5)
HGB BLD-MCNC: 10 G/DL (ref 12–16)
IMM GRANULOCYTES # BLD AUTO: 0.14 K/UL (ref 0–0.04)
IMM GRANULOCYTES NFR BLD AUTO: 1.2 % (ref 0–0.5)
LYMPHOCYTES # BLD AUTO: 1.9 K/UL (ref 1–4.8)
LYMPHOCYTES NFR BLD: 16.4 % (ref 18–48)
MAGNESIUM SERPL-MCNC: 1.8 MG/DL (ref 1.6–2.6)
MCH RBC QN AUTO: 27.8 PG (ref 27–31)
MCHC RBC AUTO-ENTMCNC: 32.8 G/DL (ref 32–36)
MCV RBC AUTO: 85 FL (ref 82–98)
MONOCYTES # BLD AUTO: 0.7 K/UL (ref 0.3–1)
MONOCYTES NFR BLD: 6.4 % (ref 4–15)
NEUTROPHILS # BLD AUTO: 8.4 K/UL (ref 1.8–7.7)
NEUTROPHILS NFR BLD: 72.8 % (ref 38–73)
NRBC BLD-RTO: 0 /100 WBC
PLATELET # BLD AUTO: 305 K/UL (ref 150–450)
PMV BLD AUTO: 9.3 FL (ref 9.2–12.9)
POCT GLUCOSE: 104 MG/DL (ref 70–110)
POCT GLUCOSE: 122 MG/DL (ref 70–110)
POCT GLUCOSE: 159 MG/DL (ref 70–110)
POTASSIUM SERPL-SCNC: 4.5 MMOL/L (ref 3.5–5.1)
RBC # BLD AUTO: 3.6 M/UL (ref 4–5.4)
SODIUM SERPL-SCNC: 141 MMOL/L (ref 136–145)
WBC # BLD AUTO: 11.48 K/UL (ref 3.9–12.7)

## 2021-09-25 PROCEDURE — 83735 ASSAY OF MAGNESIUM: CPT | Performed by: HOSPITALIST

## 2021-09-25 PROCEDURE — 80048 BASIC METABOLIC PNL TOTAL CA: CPT | Performed by: STUDENT IN AN ORGANIZED HEALTH CARE EDUCATION/TRAINING PROGRAM

## 2021-09-25 PROCEDURE — 25000003 PHARM REV CODE 250: Performed by: HOSPITALIST

## 2021-09-25 PROCEDURE — 85025 COMPLETE CBC W/AUTO DIFF WBC: CPT | Performed by: STUDENT IN AN ORGANIZED HEALTH CARE EDUCATION/TRAINING PROGRAM

## 2021-09-25 PROCEDURE — 25000003 PHARM REV CODE 250: Performed by: STUDENT IN AN ORGANIZED HEALTH CARE EDUCATION/TRAINING PROGRAM

## 2021-09-25 PROCEDURE — 99223 PR INITIAL HOSPITAL CARE,LEVL III: ICD-10-PCS | Mod: ,,, | Performed by: PHYSICIAN ASSISTANT

## 2021-09-25 PROCEDURE — 99239 PR HOSPITAL DISCHARGE DAY,>30 MIN: ICD-10-PCS | Mod: ,,, | Performed by: STUDENT IN AN ORGANIZED HEALTH CARE EDUCATION/TRAINING PROGRAM

## 2021-09-25 PROCEDURE — 99239 HOSP IP/OBS DSCHRG MGMT >30: CPT | Mod: ,,, | Performed by: STUDENT IN AN ORGANIZED HEALTH CARE EDUCATION/TRAINING PROGRAM

## 2021-09-25 PROCEDURE — 99223 1ST HOSP IP/OBS HIGH 75: CPT | Mod: ,,, | Performed by: PHYSICIAN ASSISTANT

## 2021-09-25 PROCEDURE — 36415 COLL VENOUS BLD VENIPUNCTURE: CPT | Performed by: STUDENT IN AN ORGANIZED HEALTH CARE EDUCATION/TRAINING PROGRAM

## 2021-09-25 RX ADMIN — MUPIROCIN: 20 OINTMENT TOPICAL at 08:09

## 2021-09-25 RX ADMIN — ATORVASTATIN CALCIUM 40 MG: 20 TABLET, FILM COATED ORAL at 08:09

## 2021-09-25 RX ADMIN — VANCOMYCIN HYDROCHLORIDE 125 MG: KIT at 05:09

## 2021-09-25 RX ADMIN — PROPRANOLOL HYDROCHLORIDE 20 MG: 20 TABLET ORAL at 03:09

## 2021-09-25 RX ADMIN — VANCOMYCIN HYDROCHLORIDE 125 MG: KIT at 12:09

## 2021-09-25 RX ADMIN — PROPRANOLOL HYDROCHLORIDE 20 MG: 20 TABLET ORAL at 10:09

## 2021-09-25 RX ADMIN — METHIMAZOLE 10 MG: 10 TABLET ORAL at 08:09

## 2021-09-25 RX ADMIN — PAROXETINE 40 MG: 10 TABLET, FILM COATED ORAL at 08:09

## 2021-09-27 ENCOUNTER — PATIENT OUTREACH (OUTPATIENT)
Dept: ADMINISTRATIVE | Facility: CLINIC | Age: 61
End: 2021-09-27

## 2021-09-27 LAB
BACTERIA BLD CULT: NORMAL
BACTERIA BLD CULT: NORMAL

## 2021-09-29 ENCOUNTER — CARE AT HOME (OUTPATIENT)
Dept: HOME HEALTH SERVICES | Facility: CLINIC | Age: 61
End: 2021-09-29
Payer: MEDICAID

## 2021-09-29 VITALS
SYSTOLIC BLOOD PRESSURE: 110 MMHG | HEART RATE: 72 BPM | DIASTOLIC BLOOD PRESSURE: 62 MMHG | RESPIRATION RATE: 18 BRPM | OXYGEN SATURATION: 97 %

## 2021-09-29 DIAGNOSIS — E05.00 GRAVES DISEASE: Primary | ICD-10-CM

## 2021-09-29 DIAGNOSIS — A04.71 RECURRENT CLOSTRIDIOIDES DIFFICILE DIARRHEA: ICD-10-CM

## 2021-09-29 DIAGNOSIS — I10 ESSENTIAL HYPERTENSION: ICD-10-CM

## 2021-09-29 PROCEDURE — 99350 PR HOME VISIT,ESTAB PATIENT,LEVEL IV: ICD-10-PCS | Mod: S$GLB,,, | Performed by: NURSE PRACTITIONER

## 2021-09-29 PROCEDURE — 99350 HOME/RES VST EST HIGH MDM 60: CPT | Mod: S$GLB,,, | Performed by: NURSE PRACTITIONER

## 2021-10-05 LAB
GPP - ADENOVIRUS 40/41: NORMAL
GPP - CAMPYLOBACTER: NORMAL
GPP - CRYPTOSPORIDIUM: NORMAL
GPP - E COLI O157: NORMAL
GPP - ENTAMOEBA HISTOLYTICA: NORMAL
GPP - ENTEROTOXIGENIC E COLI (ETEC): NORMAL
GPP - GIARDIA LAMBLIA: NORMAL
GPP - NOROVIRUS GI/GII: NORMAL
GPP - ROTAVIRUS A: NORMAL
GPP - SALMONELLA: NORMAL
GPP - SHIGELLA: NORMAL
GPP - VIBRIO CHOLERA: NORMAL
GPP - YERSINIA ENTEROCOLITICA: NORMAL
LACTATE PLASV-SCNC: NORMAL MMOL/L

## 2021-10-12 ENCOUNTER — OFFICE VISIT (OUTPATIENT)
Dept: INFECTIOUS DISEASES | Facility: CLINIC | Age: 61
End: 2021-10-12
Payer: MEDICAID

## 2021-10-12 VITALS
TEMPERATURE: 98 F | DIASTOLIC BLOOD PRESSURE: 55 MMHG | SYSTOLIC BLOOD PRESSURE: 116 MMHG | HEIGHT: 63 IN | HEART RATE: 78 BPM | WEIGHT: 149.69 LBS | BODY MASS INDEX: 26.52 KG/M2

## 2021-10-12 DIAGNOSIS — A04.72 C. DIFFICILE COLITIS: Primary | ICD-10-CM

## 2021-10-12 PROCEDURE — 99213 PR OFFICE/OUTPT VISIT, EST, LEVL III, 20-29 MIN: ICD-10-PCS | Mod: S$PBB,,, | Performed by: PHYSICIAN ASSISTANT

## 2021-10-12 PROCEDURE — 99213 OFFICE O/P EST LOW 20 MIN: CPT | Mod: S$PBB,,, | Performed by: PHYSICIAN ASSISTANT

## 2021-10-12 PROCEDURE — 99999 PR PBB SHADOW E&M-EST. PATIENT-LVL III: ICD-10-PCS | Mod: PBBFAC,,, | Performed by: PHYSICIAN ASSISTANT

## 2021-10-12 PROCEDURE — 99999 PR PBB SHADOW E&M-EST. PATIENT-LVL III: CPT | Mod: PBBFAC,,, | Performed by: PHYSICIAN ASSISTANT

## 2021-10-12 PROCEDURE — 99213 OFFICE O/P EST LOW 20 MIN: CPT | Mod: PBBFAC | Performed by: PHYSICIAN ASSISTANT

## 2021-10-19 ENCOUNTER — LAB VISIT (OUTPATIENT)
Dept: LAB | Facility: HOSPITAL | Age: 61
End: 2021-10-19
Payer: MEDICAID

## 2021-10-19 ENCOUNTER — OFFICE VISIT (OUTPATIENT)
Dept: ENDOCRINOLOGY | Facility: CLINIC | Age: 61
End: 2021-10-19
Payer: MEDICAID

## 2021-10-19 VITALS
SYSTOLIC BLOOD PRESSURE: 129 MMHG | HEART RATE: 81 BPM | BODY MASS INDEX: 27.56 KG/M2 | OXYGEN SATURATION: 98 % | HEIGHT: 63 IN | DIASTOLIC BLOOD PRESSURE: 78 MMHG | WEIGHT: 155.56 LBS

## 2021-10-19 DIAGNOSIS — E05.00 GRAVES DISEASE: ICD-10-CM

## 2021-10-19 DIAGNOSIS — E55.9 VITAMIN D DEFICIENCY: ICD-10-CM

## 2021-10-19 LAB
T4 FREE SERPL-MCNC: 0.99 NG/DL (ref 0.71–1.51)
TSH SERPL DL<=0.005 MIU/L-ACNC: <0.01 UIU/ML (ref 0.4–4)

## 2021-10-19 PROCEDURE — 99999 PR PBB SHADOW E&M-EST. PATIENT-LVL IV: ICD-10-PCS | Mod: PBBFAC,,, | Performed by: STUDENT IN AN ORGANIZED HEALTH CARE EDUCATION/TRAINING PROGRAM

## 2021-10-19 PROCEDURE — 99214 PR OFFICE/OUTPT VISIT, EST, LEVL IV, 30-39 MIN: ICD-10-PCS | Mod: S$PBB,,, | Performed by: STUDENT IN AN ORGANIZED HEALTH CARE EDUCATION/TRAINING PROGRAM

## 2021-10-19 PROCEDURE — 99214 OFFICE O/P EST MOD 30 MIN: CPT | Mod: S$PBB,,, | Performed by: STUDENT IN AN ORGANIZED HEALTH CARE EDUCATION/TRAINING PROGRAM

## 2021-10-19 PROCEDURE — 99999 PR PBB SHADOW E&M-EST. PATIENT-LVL IV: CPT | Mod: PBBFAC,,, | Performed by: STUDENT IN AN ORGANIZED HEALTH CARE EDUCATION/TRAINING PROGRAM

## 2021-10-19 PROCEDURE — 99214 OFFICE O/P EST MOD 30 MIN: CPT | Mod: PBBFAC | Performed by: STUDENT IN AN ORGANIZED HEALTH CARE EDUCATION/TRAINING PROGRAM

## 2021-10-19 PROCEDURE — 36415 COLL VENOUS BLD VENIPUNCTURE: CPT | Performed by: STUDENT IN AN ORGANIZED HEALTH CARE EDUCATION/TRAINING PROGRAM

## 2021-10-19 PROCEDURE — 84443 ASSAY THYROID STIM HORMONE: CPT | Performed by: STUDENT IN AN ORGANIZED HEALTH CARE EDUCATION/TRAINING PROGRAM

## 2021-10-19 PROCEDURE — 84439 ASSAY OF FREE THYROXINE: CPT | Performed by: STUDENT IN AN ORGANIZED HEALTH CARE EDUCATION/TRAINING PROGRAM

## 2021-10-20 ENCOUNTER — TELEPHONE (OUTPATIENT)
Dept: INFECTIOUS DISEASES | Facility: CLINIC | Age: 61
End: 2021-10-20

## 2021-11-01 DIAGNOSIS — E05.00 GRAVES DISEASE: Primary | ICD-10-CM

## 2021-11-03 RX ORDER — METHIMAZOLE 10 MG/1
10 TABLET ORAL DAILY
Qty: 30 TABLET | Refills: 1 | Status: SHIPPED | OUTPATIENT
Start: 2021-11-03 | End: 2022-01-03 | Stop reason: SDUPTHER

## 2021-12-09 ENCOUNTER — TELEPHONE (OUTPATIENT)
Dept: ENDOCRINOLOGY | Facility: CLINIC | Age: 61
End: 2021-12-09
Payer: MEDICAID

## 2021-12-29 ENCOUNTER — TELEPHONE (OUTPATIENT)
Dept: INFECTIOUS DISEASES | Facility: HOSPITAL | Age: 61
End: 2021-12-29
Payer: MEDICAID

## 2022-01-04 RX ORDER — METHIMAZOLE 10 MG/1
10 TABLET ORAL DAILY
Qty: 30 TABLET | Refills: 8 | Status: SHIPPED | OUTPATIENT
Start: 2022-01-04 | End: 2022-05-05

## 2022-04-01 ENCOUNTER — TELEPHONE (OUTPATIENT)
Dept: ENDOCRINOLOGY | Facility: CLINIC | Age: 62
End: 2022-04-01
Payer: MEDICAID

## 2022-04-01 NOTE — TELEPHONE ENCOUNTER
----- Message from Jenn Steele MA sent at 3/31/2022  4:09 PM CDT -----  Contact: [phone]    ----- Message -----  From: Mary Beth Butler  Sent: 3/31/2022   3:24 PM CDT  To: Jamee Rubin Staff    Pt is calling to schedule her follow up in may.    Pt would like a call back.    [phone]

## 2022-05-05 ENCOUNTER — OFFICE VISIT (OUTPATIENT)
Dept: ENDOCRINOLOGY | Facility: CLINIC | Age: 62
End: 2022-05-05
Payer: MEDICAID

## 2022-05-05 VITALS
SYSTOLIC BLOOD PRESSURE: 157 MMHG | BODY MASS INDEX: 33.69 KG/M2 | OXYGEN SATURATION: 98 % | HEART RATE: 77 BPM | DIASTOLIC BLOOD PRESSURE: 80 MMHG | HEIGHT: 63 IN | WEIGHT: 190.13 LBS

## 2022-05-05 DIAGNOSIS — M81.0 OSTEOPOROSIS, UNSPECIFIED OSTEOPOROSIS TYPE, UNSPECIFIED PATHOLOGICAL FRACTURE PRESENCE: ICD-10-CM

## 2022-05-05 DIAGNOSIS — E55.9 VITAMIN D DEFICIENCY: ICD-10-CM

## 2022-05-05 DIAGNOSIS — E05.00 GRAVES DISEASE: Primary | ICD-10-CM

## 2022-05-05 DIAGNOSIS — I10 ESSENTIAL HYPERTENSION: ICD-10-CM

## 2022-05-05 PROCEDURE — 99999 PR PBB SHADOW E&M-EST. PATIENT-LVL IV: ICD-10-PCS | Mod: PBBFAC,,, | Performed by: STUDENT IN AN ORGANIZED HEALTH CARE EDUCATION/TRAINING PROGRAM

## 2022-05-05 PROCEDURE — 99214 OFFICE O/P EST MOD 30 MIN: CPT | Mod: PBBFAC | Performed by: STUDENT IN AN ORGANIZED HEALTH CARE EDUCATION/TRAINING PROGRAM

## 2022-05-05 PROCEDURE — 99999 PR PBB SHADOW E&M-EST. PATIENT-LVL IV: CPT | Mod: PBBFAC,,, | Performed by: STUDENT IN AN ORGANIZED HEALTH CARE EDUCATION/TRAINING PROGRAM

## 2022-05-05 PROCEDURE — 99214 OFFICE O/P EST MOD 30 MIN: CPT | Mod: S$PBB,,, | Performed by: STUDENT IN AN ORGANIZED HEALTH CARE EDUCATION/TRAINING PROGRAM

## 2022-05-05 PROCEDURE — 99214 PR OFFICE/OUTPT VISIT, EST, LEVL IV, 30-39 MIN: ICD-10-PCS | Mod: S$PBB,,, | Performed by: STUDENT IN AN ORGANIZED HEALTH CARE EDUCATION/TRAINING PROGRAM

## 2022-05-05 RX ORDER — METHIMAZOLE 10 MG/1
10 TABLET ORAL 2 TIMES DAILY
Qty: 60 TABLET | Refills: 11 | Status: SHIPPED | OUTPATIENT
Start: 2022-05-05 | End: 2022-12-12

## 2022-05-05 NOTE — ASSESSMENT & PLAN NOTE
- Noted to have slightly elevated blood pressure today (157/80)  - Patient not sure of her blood pressure medicines  - Requested her to bring her medications on her next visit  - She will reach out to her primary care for further adjustments on her anti-hypertensive medicine regime

## 2022-05-05 NOTE — ASSESSMENT & PLAN NOTE
- Her TSH was suppressed in April 2022,   - Methimazole dose increased to 10 mg twice daily and patient advised on compliance of medication  - Cause-specific treatment options and associated risks discussed with patient. Reviewed treatment options of methimazole, I131 therapy and surgery   - Will repeat thyroid function test in 4 weeks and make further adjustment at that time   - Will check bone mineral density scan, she has multiple risk factors apart from thyrotoxicosis including early menopause when she was in 30's and  race

## 2022-05-05 NOTE — PROGRESS NOTES
I have reviewed and concur with Dr. Mancuso's history, physical, assessment, and plan.  I have personally interviewed and examined the patient.    Graves' disease with TSH which remains suppressed after several months of treatment. will increase methimazole dose and update labs 1 month  BP above goal however patient unsure current medications and changed recently by PCP. Will monitor at home and notify PCP if remains elevated  Check DXA    Mariam Sanchez MD

## 2022-05-26 ENCOUNTER — HOSPITAL ENCOUNTER (OUTPATIENT)
Dept: RADIOLOGY | Facility: CLINIC | Age: 62
Discharge: HOME | End: 2022-05-26
Attending: STUDENT IN AN ORGANIZED HEALTH CARE EDUCATION/TRAINING PROGRAM
Payer: MEDICAID

## 2022-05-26 ENCOUNTER — TELEPHONE (OUTPATIENT)
Dept: ENDOCRINOLOGY | Facility: CLINIC | Age: 62
End: 2022-05-26
Payer: MEDICAID

## 2022-05-26 DIAGNOSIS — E05.00 GRAVES DISEASE: Primary | ICD-10-CM

## 2022-05-26 DIAGNOSIS — M81.0 OSTEOPOROSIS, UNSPECIFIED OSTEOPOROSIS TYPE, UNSPECIFIED PATHOLOGICAL FRACTURE PRESENCE: ICD-10-CM

## 2022-05-26 PROCEDURE — 77081 DEXA BONE DENSITY APPENDICULAR SKELETON: ICD-10-PCS | Mod: 26,59,, | Performed by: INTERNAL MEDICINE

## 2022-05-26 PROCEDURE — 77081 DXA BONE DENSITY APPENDICULR: CPT | Mod: TC

## 2022-05-26 PROCEDURE — 77080 DXA BONE DENSITY AXIAL: CPT | Mod: TC

## 2022-05-26 PROCEDURE — 77080 DXA BONE DENSITY AXIAL: CPT | Mod: 26,,, | Performed by: INTERNAL MEDICINE

## 2022-05-26 PROCEDURE — 77080 DEXA BONE DENSITY SPINE HIP: ICD-10-PCS | Mod: 26,,, | Performed by: INTERNAL MEDICINE

## 2022-05-26 PROCEDURE — 77081 DXA BONE DENSITY APPENDICULR: CPT | Mod: 26,59,, | Performed by: INTERNAL MEDICINE

## 2022-05-26 NOTE — TELEPHONE ENCOUNTER
----- Message from Mariam Sanchez MD sent at 5/26/2022 10:44 AM CDT -----  Please call and ask her to reduce methimazole dose to 10 mg once daily (should be taking 10 mg twice daily). We will recheck labs in 4 weeks

## 2022-05-26 NOTE — TELEPHONE ENCOUNTER
Spoke to the patient about her recent thyroid function test results. Discussed management of her underlying Graves Disease.   Advised her to reduce her methimazole dose to 10 mg daily. Will repeat thyroid function tests in 4 weeks.

## 2022-09-20 ENCOUNTER — LAB VISIT (OUTPATIENT)
Dept: LAB | Facility: HOSPITAL | Age: 62
End: 2022-09-20
Payer: MEDICAID

## 2022-09-20 ENCOUNTER — OFFICE VISIT (OUTPATIENT)
Dept: ENDOCRINOLOGY | Facility: CLINIC | Age: 62
End: 2022-09-20
Payer: MEDICAID

## 2022-09-20 VITALS
OXYGEN SATURATION: 97 % | DIASTOLIC BLOOD PRESSURE: 70 MMHG | SYSTOLIC BLOOD PRESSURE: 120 MMHG | BODY MASS INDEX: 33.73 KG/M2 | WEIGHT: 190.38 LBS | HEIGHT: 63 IN | HEART RATE: 74 BPM

## 2022-09-20 DIAGNOSIS — I10 ESSENTIAL HYPERTENSION: ICD-10-CM

## 2022-09-20 DIAGNOSIS — E05.00 GRAVES DISEASE: Primary | ICD-10-CM

## 2022-09-20 DIAGNOSIS — E05.00 GRAVES DISEASE: ICD-10-CM

## 2022-09-20 LAB
T4 FREE SERPL-MCNC: 0.83 NG/DL (ref 0.71–1.51)
TSH SERPL DL<=0.005 MIU/L-ACNC: 3.4 UIU/ML (ref 0.4–4)

## 2022-09-20 PROCEDURE — 99999 PR PBB SHADOW E&M-EST. PATIENT-LVL III: ICD-10-PCS | Mod: PBBFAC,,, | Performed by: STUDENT IN AN ORGANIZED HEALTH CARE EDUCATION/TRAINING PROGRAM

## 2022-09-20 PROCEDURE — 1160F RVW MEDS BY RX/DR IN RCRD: CPT | Mod: CPTII,,, | Performed by: STUDENT IN AN ORGANIZED HEALTH CARE EDUCATION/TRAINING PROGRAM

## 2022-09-20 PROCEDURE — 3008F PR BODY MASS INDEX (BMI) DOCUMENTED: ICD-10-PCS | Mod: CPTII,,, | Performed by: STUDENT IN AN ORGANIZED HEALTH CARE EDUCATION/TRAINING PROGRAM

## 2022-09-20 PROCEDURE — 99214 PR OFFICE/OUTPT VISIT, EST, LEVL IV, 30-39 MIN: ICD-10-PCS | Mod: S$PBB,,, | Performed by: STUDENT IN AN ORGANIZED HEALTH CARE EDUCATION/TRAINING PROGRAM

## 2022-09-20 PROCEDURE — 3074F PR MOST RECENT SYSTOLIC BLOOD PRESSURE < 130 MM HG: ICD-10-PCS | Mod: CPTII,,, | Performed by: STUDENT IN AN ORGANIZED HEALTH CARE EDUCATION/TRAINING PROGRAM

## 2022-09-20 PROCEDURE — 3008F BODY MASS INDEX DOCD: CPT | Mod: CPTII,,, | Performed by: STUDENT IN AN ORGANIZED HEALTH CARE EDUCATION/TRAINING PROGRAM

## 2022-09-20 PROCEDURE — 99999 PR PBB SHADOW E&M-EST. PATIENT-LVL III: CPT | Mod: PBBFAC,,, | Performed by: STUDENT IN AN ORGANIZED HEALTH CARE EDUCATION/TRAINING PROGRAM

## 2022-09-20 PROCEDURE — 36415 COLL VENOUS BLD VENIPUNCTURE: CPT | Performed by: STUDENT IN AN ORGANIZED HEALTH CARE EDUCATION/TRAINING PROGRAM

## 2022-09-20 PROCEDURE — 3078F DIAST BP <80 MM HG: CPT | Mod: CPTII,,, | Performed by: STUDENT IN AN ORGANIZED HEALTH CARE EDUCATION/TRAINING PROGRAM

## 2022-09-20 PROCEDURE — 1159F MED LIST DOCD IN RCRD: CPT | Mod: CPTII,,, | Performed by: STUDENT IN AN ORGANIZED HEALTH CARE EDUCATION/TRAINING PROGRAM

## 2022-09-20 PROCEDURE — 84443 ASSAY THYROID STIM HORMONE: CPT | Performed by: STUDENT IN AN ORGANIZED HEALTH CARE EDUCATION/TRAINING PROGRAM

## 2022-09-20 PROCEDURE — 3074F SYST BP LT 130 MM HG: CPT | Mod: CPTII,,, | Performed by: STUDENT IN AN ORGANIZED HEALTH CARE EDUCATION/TRAINING PROGRAM

## 2022-09-20 PROCEDURE — 99214 OFFICE O/P EST MOD 30 MIN: CPT | Mod: S$PBB,,, | Performed by: STUDENT IN AN ORGANIZED HEALTH CARE EDUCATION/TRAINING PROGRAM

## 2022-09-20 PROCEDURE — 1160F PR REVIEW ALL MEDS BY PRESCRIBER/CLIN PHARMACIST DOCUMENTED: ICD-10-PCS | Mod: CPTII,,, | Performed by: STUDENT IN AN ORGANIZED HEALTH CARE EDUCATION/TRAINING PROGRAM

## 2022-09-20 PROCEDURE — 1159F PR MEDICATION LIST DOCUMENTED IN MEDICAL RECORD: ICD-10-PCS | Mod: CPTII,,, | Performed by: STUDENT IN AN ORGANIZED HEALTH CARE EDUCATION/TRAINING PROGRAM

## 2022-09-20 PROCEDURE — 99213 OFFICE O/P EST LOW 20 MIN: CPT | Mod: PBBFAC | Performed by: STUDENT IN AN ORGANIZED HEALTH CARE EDUCATION/TRAINING PROGRAM

## 2022-09-20 PROCEDURE — 3078F PR MOST RECENT DIASTOLIC BLOOD PRESSURE < 80 MM HG: ICD-10-PCS | Mod: CPTII,,, | Performed by: STUDENT IN AN ORGANIZED HEALTH CARE EDUCATION/TRAINING PROGRAM

## 2022-09-20 PROCEDURE — 84439 ASSAY OF FREE THYROXINE: CPT | Performed by: STUDENT IN AN ORGANIZED HEALTH CARE EDUCATION/TRAINING PROGRAM

## 2022-09-20 NOTE — ASSESSMENT & PLAN NOTE
- Her TSH was suppressed in April 2022, now in normal range and patient asymptomatic  - Will decrease her Methimazole dose from 10 mg daily to 5 mg daily and patient advised on compliance of medication  - Cause-specific treatment options and associated risks discussed with patient. Will   - Will repeat thyroid function test in 3 months, will make further adjustment at that time if needed  - Reviewed bone mineral density scan

## 2022-09-20 NOTE — PROGRESS NOTES
I have reviewed and concur with the fellow's history,  assessment, and plan.  I have personally interviewed the patient and all questions were answered.

## 2022-09-20 NOTE — PROGRESS NOTES
Subjective:      Chief Complaint: hyperthyroidism    HPI: Mariam Bran is a 62 y.o. female who is here for an evaluation for hyperthyroidism. She has a history of Graves Disease.   Today she comes for a follow up.   On her visit today, she has normal heart rate and blood pressure    Further regarding Hyperthyroidism  - TRAb elevated at 17 consistent with Graves disease. Thyroid ultrasound obtained 08/09/2021 demonstrates subcentimeter nodules and normal vascularity.   - Methimazole therapy was started 08/08/2021  - On today's visit, she tells me she is taking methimazole 10 mg daily and is compliant with therapy  - She has no complaints today, she feels much better than her last visit  - Current symptoms:         No   Yes    [x]    []   Weight loss    [x]    []   Fatigue    [x]    []   Diarrhea    [x]    []   Hair loss    [x]    []   Brittle nails    [x]    []   Mental fog    [x]    []   Heat intolerance    []    [x]   Memory impair    [x]    []   Muscle weakness    [x]    []   Neck swelling, pain, pressure    [x]    []   Lithium or Amiodarone use    [x]    []   Chemotherapy    [x]    []   Prior neck radiation    [x]    []   Recent severe illness        Family History:      No   Yes    [x]    []   Thyroid disorder     [x]    []   Thyroid cancer           Previous thyroid ultrasound done on 8.9.21  The thyroid is normal in size.  Right lobe of the thyroid measures 4.2 x 1.9 x 1.4 cm.  Left lobe of the thyroid measures 4.0 x 1.4 x 1.4 cm.  The isthmus is not thickened measuring 0.2 cm in AP diameter.  Thyroid parenchyma demonstrates normal vascularity.  Two right-sided subcentimeter thyroid nodules.  In the lateral right thyroid lobe superiorly there is a 0.3 x 0.5 x 0.4 cm (AP by TV by CC) isoechoic solid-appearing nodule (TR 3).  In the medial lower pole of the right thyroid lobe there is a 0.2 x 0.2 x 0.3 cm hypoechoic solid-appearing nodule (TR 4).  Left thyroid lobe demonstrates no nodules.  Cervical lymph nodes  demonstrate normal morphology and size.      DEXA Scan May 2022:    FINDINGS:  Lumbar spine (L1-L4):            BMD is 1.199 g/cm2, T-score is 1.4, and Z-score is 2.9.  Total hip:                                BMD is 1.013 g/cm2, T-score is 0.6, and Z-score is 1.6.  Femoral neck:                        BMD is 0.846 g/cm2, T-score is 0.0, and Z-score is 1.3.  Distal 1/3 radius:                    BMD is 0.721 g/cm2, T-score is 0.4, and Z-score is 1.8.     FRAX:  12% risk of a major osteoporotic fracture in the next 10 years.  0.1% risk of hip fracture in the next 10 years.     Impression:  Elevated BMD of the lumbar spine.        Lab Results   Component Value Date    TSH 3.396 09/20/2022    TSH 0.542 05/26/2022    TSH <0.010 (L) 10/19/2021    FREET4 0.83 09/20/2022    FREET4 0.66 (L) 05/26/2022    FREET4 0.99 10/19/2021    THYROPEROXID <6.0 08/10/2021     Reviewed past medical, family, social history and updated as appropriate.    Review of Systems as above      Objective:     Vitals:    09/20/22 0923   BP: 120/70   Pulse: 74     BP Readings from Last 5 Encounters:   09/20/22 120/70   05/05/22 (!) 157/80   10/19/21 129/78   10/12/21 (!) 116/55   09/29/21 110/62       Physical Exam  Constitutional:       Appearance: She is obese.   HENT:      Head: Normocephalic.      Right Ear: External ear normal.      Left Ear: External ear normal.   Eyes:      Extraocular Movements: Extraocular movements intact.      Comments: Exophthalmus present   Cardiovascular:      Rate and Rhythm: Normal rate.   Pulmonary:      Effort: Pulmonary effort is normal.   Musculoskeletal:         General: Normal range of motion.      Cervical back: Normal range of motion.   Skin:     General: Skin is warm.   Neurological:      General: No focal deficit present.      Mental Status: She is alert.   Psychiatric:         Mood and Affect: Mood normal.       Wt Readings from Last 10 Encounters:   09/20/22 0923 86.4 kg (190 lb 5.9 oz)   05/05/22 0928  86.3 kg (190 lb 2.4 oz)   10/19/21 1303 70.5 kg (155 lb 8.6 oz)   10/12/21 1412 67.9 kg (149 lb 11.1 oz)   09/22/21 1538 60.3 kg (133 lb)   09/22/21 1437 61.9 kg (136 lb 7.4 oz)   08/08/21 1509 65.8 kg (145 lb 1 oz)   08/08/21 1024 65.8 kg (145 lb)   07/14/21 2035 68 kg (150 lb)   07/11/21 1449 68 kg (150 lb)   06/26/21 0600 70.4 kg (155 lb 3.3 oz)   06/24/21 1116 83.5 kg (184 lb)   06/24/21 0414 83.9 kg (184 lb 15.8 oz)   06/23/21 1421 83.9 kg (185 lb)       Lab Results   Component Value Date    HGBA1C 5.9 (H) 07/15/2021     Lab Results   Component Value Date    CHOL 105 (L) 06/09/2021    HDL 29 (L) 06/09/2021    LDLCALC 60.8 (L) 06/09/2021    TRIG 76 06/09/2021    CHOLHDL 27.6 06/09/2021     Lab Results   Component Value Date     09/25/2021    K 4.5 09/25/2021     09/25/2021    CO2 25 09/25/2021     09/25/2021    BUN 11 09/25/2021    CREATININE 0.6 09/25/2021    CALCIUM 8.7 09/25/2021    PROT 5.5 (L) 09/24/2021    ALBUMIN 2.3 (L) 09/24/2021    BILITOT 0.2 09/24/2021    ALKPHOS 118 09/24/2021    AST 17 09/24/2021    ALT 28 09/24/2021    ANIONGAP 9 09/25/2021    ESTGFRAFRICA >60.0 09/25/2021    EGFRNONAA >60.0 09/25/2021    TSH 3.396 09/20/2022      No results found for: MICALBCREAT    Assessment/Plan:     Graves disease  - Her TSH was suppressed in April 2022, now in normal range and patient asymptomatic  - Will decrease her Methimazole dose from 10 mg daily to 5 mg daily and patient advised on compliance of medication  - Cause-specific treatment options and associated risks discussed with patient. Will   - Will repeat thyroid function test in 3 months, will make further adjustment at that time if needed  - Reviewed bone mineral density scan    Essential hypertension  - well controlled on today's visit  - 120/70 today  - continue current medications      Follow up in 3 months     Dr. Scottie Mancuso  [facility], [address] Floor  [address], [place],  [address]    Office: [phone]  Fax: [phone]    The above history labs imaging impression and plan were discussed with attending physician who is in agreement and also took part in this patient's care.  I personally reviewed all of the patients available medications, labs, imaging, vitals, allergies, medical history.

## 2023-01-25 NOTE — PROGRESS NOTES
Subjective:      Chief Complaint: hyperthyroidism    HPI: Mariam Bran is a 62 y.o. female who is here for an evaluation for hyperthyroidism. She has a history of Graves Disease.   Today she comes for a follow up.       Further regarding Hyperthyroidism  - TRAb previously elevated at 17 consistent with Graves disease. Thyroid ultrasound obtained 08/09/2021 demonstrates subcentimeter nodules and normal vascularity.   - Methimazole therapy was started 08/08/2021  - On today's visit, she tells me she is taking methimazole 10 mg daily and is compliant with therapy  - She has no complaints today  - Current symptoms:       No   Yes    [x]    []   Weight loss    [x]    []   Fatigue    [x]    []   Diarrhea    []    [x]   Hair loss     [x]    []   Brittle nails    [x]    []   Mental fog    []    [x]   Heat intolerance    []    [x]   Memory impair    [x]    []   Muscle weakness    [x]    []   Neck swelling, pain, pressure    [x]    []   Lithium or Amiodarone use    [x]    []   Chemotherapy    [x]    []   Prior neck radiation    [x]    []   Recent severe illness        Family History:      No   Yes    [x]    []   Thyroid disorder     [x]    []   Thyroid cancer      Previous thyroid ultrasound done on 8.9.21  The thyroid is normal in size.  Right lobe of the thyroid measures 4.2 x 1.9 x 1.4 cm.  Left lobe of the thyroid measures 4.0 x 1.4 x 1.4 cm.  The isthmus is not thickened measuring 0.2 cm in AP diameter.  Thyroid parenchyma demonstrates normal vascularity.  Two right-sided subcentimeter thyroid nodules.  In the lateral right thyroid lobe superiorly there is a 0.3 x 0.5 x 0.4 cm (AP by TV by CC) isoechoic solid-appearing nodule (TR 3).  In the medial lower pole of the right thyroid lobe there is a 0.2 x 0.2 x 0.3 cm hypoechoic solid-appearing nodule (TR 4).  Left thyroid lobe demonstrates no nodules.  Cervical lymph nodes demonstrate normal morphology and size.      DEXA Scan May 2022:    FINDINGS:  Lumbar spine (L1-L4):             BMD is 1.199 g/cm2, T-score is 1.4, and Z-score is 2.9.  Total hip:                                BMD is 1.013 g/cm2, T-score is 0.6, and Z-score is 1.6.  Femoral neck:                        BMD is 0.846 g/cm2, T-score is 0.0, and Z-score is 1.3.  Distal 1/3 radius:                    BMD is 0.721 g/cm2, T-score is 0.4, and Z-score is 1.8.     FRAX:  12% risk of a major osteoporotic fracture in the next 10 years.  0.1% risk of hip fracture in the next 10 years.     Impression:  Elevated BMD of the lumbar spine.        Lab Results   Component Value Date    TSH 4.368 (H) 01/26/2023    TSH 3.396 09/20/2022    TSH 0.542 05/26/2022    FREET4 0.93 01/26/2023    FREET4 0.83 09/20/2022    FREET4 0.66 (L) 05/26/2022    THYROPEROXID <6.0 08/10/2021     Reviewed past medical, family, social history and updated as appropriate.    ROS:  as above      Objective:     Vitals:    01/26/23 1015   BP: 130/80   Pulse: 99       BP Readings from Last 5 Encounters:   01/26/23 130/80   09/20/22 120/70   05/05/22 (!) 157/80   10/19/21 129/78   10/12/21 (!) 116/55       Physical Exam  Constitutional:       Appearance: She is obese.   HENT:      Head: Normocephalic.      Right Ear: External ear normal.      Left Ear: External ear normal.   Eyes:      Extraocular Movements: Extraocular movements intact.   Cardiovascular:      Rate and Rhythm: Normal rate.   Pulmonary:      Effort: Pulmonary effort is normal.   Musculoskeletal:         General: Normal range of motion.      Cervical back: Normal range of motion.   Skin:     General: Skin is warm.   Neurological:      General: No focal deficit present.      Mental Status: She is alert.   Psychiatric:         Mood and Affect: Mood normal.       Wt Readings from Last 10 Encounters:   01/26/23 1015 88.1 kg (194 lb 3.6 oz)   09/20/22 0923 86.4 kg (190 lb 5.9 oz)   05/05/22 0929 86.3 kg (190 lb 2.4 oz)   10/19/21 1303 70.5 kg (155 lb 8.6 oz)   10/12/21 1412 67.9 kg (149 lb 11.1 oz)    09/22/21 1538 60.3 kg (133 lb)   09/22/21 1437 61.9 kg (136 lb 7.4 oz)   08/08/21 1509 65.8 kg (145 lb 1 oz)   08/08/21 1024 65.8 kg (145 lb)   07/14/21 2035 68 kg (150 lb)   07/11/21 1449 68 kg (150 lb)       Lab Results   Component Value Date    HGBA1C 5.9 (H) 07/15/2021     Lab Results   Component Value Date    CHOL 105 (L) 06/09/2021    HDL 29 (L) 06/09/2021    LDLCALC 60.8 (L) 06/09/2021    TRIG 76 06/09/2021    CHOLHDL 27.6 06/09/2021     Lab Results   Component Value Date     09/25/2021    K 4.5 09/25/2021     09/25/2021    CO2 25 09/25/2021     09/25/2021    BUN 11 09/25/2021    CREATININE 0.6 09/25/2021    CALCIUM 8.7 09/25/2021    PROT 5.5 (L) 09/24/2021    ALBUMIN 2.3 (L) 09/24/2021    BILITOT 0.2 09/24/2021    ALKPHOS 118 09/24/2021    AST 17 09/24/2021    ALT 28 09/24/2021    ANIONGAP 9 09/25/2021    ESTGFRAFRICA >60.0 09/25/2021    EGFRNONAA >60.0 09/25/2021    TSH 4.368 (H) 01/26/2023      No results found for: MICALBCREAT    Assessment/Plan:     Graves disease  - Her TSH has shown some gradual up trending, now slightly above the reference range  - Will decrease her Methimazole dose from 10 mg to 7.5 mg daily and patient advised on compliance of medication  - Possible side effects discussed of Methimazole therapy and she will reach out if concerns arise, especially if concern for fever with sore throat which may indicate agranulocytosis  - Will repeat thyroid function test in 1 month, will make further adjustment at that time if needed    Tobacco abuse  - She continues to smoke 1/2 a pack of cigarettes daily  - Discussed the risk of this with hyperthyroidism/Grave's disease and potential for worsening and thyroid eye disease risk    Essential hypertension  - well controlled on today's visit, HR is mildly elevated though  - continue current medications    Follow up in 3-4 months    Nicholas Manuel DO [facility], [address]th [address]  [address] [facility],  [place], [address]    Office: [phone]  Fax: [phone]

## 2023-01-26 ENCOUNTER — LAB VISIT (OUTPATIENT)
Dept: LAB | Facility: HOSPITAL | Age: 63
End: 2023-01-26
Payer: MEDICARE

## 2023-01-26 ENCOUNTER — OFFICE VISIT (OUTPATIENT)
Dept: ENDOCRINOLOGY | Facility: CLINIC | Age: 63
End: 2023-01-26
Payer: MEDICAID

## 2023-01-26 VITALS
BODY MASS INDEX: 34.42 KG/M2 | WEIGHT: 194.25 LBS | HEART RATE: 99 BPM | DIASTOLIC BLOOD PRESSURE: 80 MMHG | SYSTOLIC BLOOD PRESSURE: 130 MMHG | HEIGHT: 63 IN | OXYGEN SATURATION: 81 %

## 2023-01-26 DIAGNOSIS — E05.00 GRAVES DISEASE: ICD-10-CM

## 2023-01-26 DIAGNOSIS — E05.00 GRAVES DISEASE: Primary | ICD-10-CM

## 2023-01-26 DIAGNOSIS — Z72.0 TOBACCO ABUSE: ICD-10-CM

## 2023-01-26 DIAGNOSIS — I10 ESSENTIAL HYPERTENSION: ICD-10-CM

## 2023-01-26 LAB
T4 FREE SERPL-MCNC: 0.93 NG/DL (ref 0.71–1.51)
TSH SERPL DL<=0.005 MIU/L-ACNC: 4.37 UIU/ML (ref 0.4–4)

## 2023-01-26 PROCEDURE — 1159F PR MEDICATION LIST DOCUMENTED IN MEDICAL RECORD: ICD-10-PCS | Mod: CPTII,,, | Performed by: STUDENT IN AN ORGANIZED HEALTH CARE EDUCATION/TRAINING PROGRAM

## 2023-01-26 PROCEDURE — 3008F BODY MASS INDEX DOCD: CPT | Mod: CPTII,,, | Performed by: STUDENT IN AN ORGANIZED HEALTH CARE EDUCATION/TRAINING PROGRAM

## 2023-01-26 PROCEDURE — 3075F SYST BP GE 130 - 139MM HG: CPT | Mod: CPTII,,, | Performed by: STUDENT IN AN ORGANIZED HEALTH CARE EDUCATION/TRAINING PROGRAM

## 2023-01-26 PROCEDURE — 99214 OFFICE O/P EST MOD 30 MIN: CPT | Mod: S$PBB,,, | Performed by: STUDENT IN AN ORGANIZED HEALTH CARE EDUCATION/TRAINING PROGRAM

## 2023-01-26 PROCEDURE — 99214 OFFICE O/P EST MOD 30 MIN: CPT | Mod: PBBFAC | Performed by: STUDENT IN AN ORGANIZED HEALTH CARE EDUCATION/TRAINING PROGRAM

## 2023-01-26 PROCEDURE — 3079F DIAST BP 80-89 MM HG: CPT | Mod: CPTII,,, | Performed by: STUDENT IN AN ORGANIZED HEALTH CARE EDUCATION/TRAINING PROGRAM

## 2023-01-26 PROCEDURE — 3079F PR MOST RECENT DIASTOLIC BLOOD PRESSURE 80-89 MM HG: ICD-10-PCS | Mod: CPTII,,, | Performed by: STUDENT IN AN ORGANIZED HEALTH CARE EDUCATION/TRAINING PROGRAM

## 2023-01-26 PROCEDURE — 3075F PR MOST RECENT SYSTOLIC BLOOD PRESS GE 130-139MM HG: ICD-10-PCS | Mod: CPTII,,, | Performed by: STUDENT IN AN ORGANIZED HEALTH CARE EDUCATION/TRAINING PROGRAM

## 2023-01-26 PROCEDURE — 3008F PR BODY MASS INDEX (BMI) DOCUMENTED: ICD-10-PCS | Mod: CPTII,,, | Performed by: STUDENT IN AN ORGANIZED HEALTH CARE EDUCATION/TRAINING PROGRAM

## 2023-01-26 PROCEDURE — 1160F PR REVIEW ALL MEDS BY PRESCRIBER/CLIN PHARMACIST DOCUMENTED: ICD-10-PCS | Mod: CPTII,,, | Performed by: STUDENT IN AN ORGANIZED HEALTH CARE EDUCATION/TRAINING PROGRAM

## 2023-01-26 PROCEDURE — 1160F RVW MEDS BY RX/DR IN RCRD: CPT | Mod: CPTII,,, | Performed by: STUDENT IN AN ORGANIZED HEALTH CARE EDUCATION/TRAINING PROGRAM

## 2023-01-26 PROCEDURE — 1159F MED LIST DOCD IN RCRD: CPT | Mod: CPTII,,, | Performed by: STUDENT IN AN ORGANIZED HEALTH CARE EDUCATION/TRAINING PROGRAM

## 2023-01-26 PROCEDURE — 84443 ASSAY THYROID STIM HORMONE: CPT | Performed by: STUDENT IN AN ORGANIZED HEALTH CARE EDUCATION/TRAINING PROGRAM

## 2023-01-26 PROCEDURE — 99999 PR PBB SHADOW E&M-EST. PATIENT-LVL IV: ICD-10-PCS | Mod: PBBFAC,,, | Performed by: STUDENT IN AN ORGANIZED HEALTH CARE EDUCATION/TRAINING PROGRAM

## 2023-01-26 PROCEDURE — 36415 COLL VENOUS BLD VENIPUNCTURE: CPT | Performed by: STUDENT IN AN ORGANIZED HEALTH CARE EDUCATION/TRAINING PROGRAM

## 2023-01-26 PROCEDURE — 84439 ASSAY OF FREE THYROXINE: CPT | Performed by: STUDENT IN AN ORGANIZED HEALTH CARE EDUCATION/TRAINING PROGRAM

## 2023-01-26 PROCEDURE — 99999 PR PBB SHADOW E&M-EST. PATIENT-LVL IV: CPT | Mod: PBBFAC,,, | Performed by: STUDENT IN AN ORGANIZED HEALTH CARE EDUCATION/TRAINING PROGRAM

## 2023-01-26 PROCEDURE — 99214 PR OFFICE/OUTPT VISIT, EST, LEVL IV, 30-39 MIN: ICD-10-PCS | Mod: S$PBB,,, | Performed by: STUDENT IN AN ORGANIZED HEALTH CARE EDUCATION/TRAINING PROGRAM

## 2023-01-26 RX ORDER — METHIMAZOLE 5 MG/1
TABLET ORAL
Qty: 135 TABLET | Refills: 3 | Status: SHIPPED | OUTPATIENT
Start: 2023-01-26 | End: 2023-05-04 | Stop reason: SDUPTHER

## 2023-01-26 NOTE — PATIENT INSTRUCTIONS
We will change you dose of medicine to from 10 mg to 7.5 mg.    This means you will need a new prescription and you will take 1.5 tablets of the new dose when you  the new bottle    We will repeat the thyroid labs in 1 month and we will see you in 4 months    Please try to stop smoking as this can affect your eyes when you have Grave's disease.

## 2023-01-27 NOTE — ASSESSMENT & PLAN NOTE
- Her TSH has shown some gradual up trending, now slightly above the reference range  - Will decrease her Methimazole dose from 10 mg to 7.5 mg daily and patient advised on compliance of medication  - Possible side effects discussed of Methimazole therapy and she will reach out if concerns arise, especially if concern for fever with sore throat which may indicate agranulocytosis  - Will repeat thyroid function test in 1 month, will make further adjustment at that time if needed

## 2023-01-27 NOTE — ASSESSMENT & PLAN NOTE
- She continues to smoke 1/2 a pack of cigarettes daily  - Discussed the risk of this with hyperthyroidism/Grave's disease and potential for worsening and thyroid eye disease risk

## 2023-01-27 NOTE — PROGRESS NOTES
I have seen the patient, reviewed the Fellow's history and physical, assessment, plan, and progress note. I have personally interviewed and examined the patient at bedside and agree with the findings.       Misael Carcamo MD  Endocrinology  [facility]

## 2023-02-27 ENCOUNTER — LAB VISIT (OUTPATIENT)
Dept: LAB | Facility: HOSPITAL | Age: 63
End: 2023-02-27
Payer: MEDICARE

## 2023-02-27 DIAGNOSIS — E05.00 GRAVES DISEASE: ICD-10-CM

## 2023-02-27 LAB
T4 FREE SERPL-MCNC: 0.91 NG/DL (ref 0.71–1.51)
TSH SERPL DL<=0.005 MIU/L-ACNC: 4.02 UIU/ML (ref 0.4–4)

## 2023-02-27 PROCEDURE — 36415 COLL VENOUS BLD VENIPUNCTURE: CPT | Performed by: STUDENT IN AN ORGANIZED HEALTH CARE EDUCATION/TRAINING PROGRAM

## 2023-02-27 PROCEDURE — 84439 ASSAY OF FREE THYROXINE: CPT | Performed by: STUDENT IN AN ORGANIZED HEALTH CARE EDUCATION/TRAINING PROGRAM

## 2023-02-27 PROCEDURE — 84443 ASSAY THYROID STIM HORMONE: CPT | Performed by: STUDENT IN AN ORGANIZED HEALTH CARE EDUCATION/TRAINING PROGRAM

## 2023-02-28 DIAGNOSIS — E05.00 GRAVES DISEASE: Primary | ICD-10-CM

## 2023-03-01 ENCOUNTER — TELEPHONE (OUTPATIENT)
Dept: ENDOCRINOLOGY | Facility: CLINIC | Age: 63
End: 2023-03-01
Payer: MEDICARE

## 2023-03-01 NOTE — TELEPHONE ENCOUNTER
Spoke to patient let her know  says that her current dose of Methimazole will remain the same for now.  It should be 7.5 mg (1 and a half tablets of the 5 mg pills).  Let me know if issues.  Thanks.

## 2023-04-26 ENCOUNTER — LAB VISIT (OUTPATIENT)
Dept: LAB | Facility: HOSPITAL | Age: 63
End: 2023-04-26
Payer: MEDICARE

## 2023-04-26 ENCOUNTER — TELEPHONE (OUTPATIENT)
Dept: ENDOCRINOLOGY | Facility: CLINIC | Age: 63
End: 2023-04-26
Payer: MEDICARE

## 2023-04-26 DIAGNOSIS — E05.00 GRAVES DISEASE: Primary | ICD-10-CM

## 2023-04-26 DIAGNOSIS — E05.00 GRAVES DISEASE: ICD-10-CM

## 2023-04-26 LAB
T4 FREE SERPL-MCNC: 0.93 NG/DL (ref 0.71–1.51)
TSH SERPL DL<=0.005 MIU/L-ACNC: 1.81 UIU/ML (ref 0.4–4)

## 2023-04-26 PROCEDURE — 84439 ASSAY OF FREE THYROXINE: CPT | Performed by: STUDENT IN AN ORGANIZED HEALTH CARE EDUCATION/TRAINING PROGRAM

## 2023-04-26 PROCEDURE — 36415 COLL VENOUS BLD VENIPUNCTURE: CPT | Performed by: STUDENT IN AN ORGANIZED HEALTH CARE EDUCATION/TRAINING PROGRAM

## 2023-04-26 PROCEDURE — 84443 ASSAY THYROID STIM HORMONE: CPT | Performed by: STUDENT IN AN ORGANIZED HEALTH CARE EDUCATION/TRAINING PROGRAM

## 2023-04-26 NOTE — TELEPHONE ENCOUNTER
I spoke with Mariam on the phone this afternoon.  Labs reviewed which show her thyroid function has shown some improvement and for now we will keep same dose of MMI at 7.5 mg daily.  No specific symptoms at this time.  She has an upcoming appointment in 1-2 weeks in the clinic.  If still stable at that time she can likely be seen less frequently in follow up but maintain regular labs.   We will recheck labs in 6-8 weeks and may be able to consider dose reduction to 5 mg of MMI at that time.  Discussed her need to stop smoking as this can worsen any involvement of the eye although no eye involvement suspected thus far.      All questions answered.  Appointment coming up with Dr. Goodwin on 05/04/2023.      Navneet Louis

## 2023-05-04 ENCOUNTER — OFFICE VISIT (OUTPATIENT)
Dept: ENDOCRINOLOGY | Facility: CLINIC | Age: 63
End: 2023-05-04
Payer: MEDICARE

## 2023-05-04 VITALS
HEIGHT: 63 IN | WEIGHT: 191.69 LBS | HEART RATE: 82 BPM | SYSTOLIC BLOOD PRESSURE: 120 MMHG | OXYGEN SATURATION: 99 % | BODY MASS INDEX: 33.96 KG/M2 | DIASTOLIC BLOOD PRESSURE: 60 MMHG

## 2023-05-04 DIAGNOSIS — E05.00 GRAVES DISEASE: Primary | ICD-10-CM

## 2023-05-04 DIAGNOSIS — I10 ESSENTIAL HYPERTENSION: ICD-10-CM

## 2023-05-04 DIAGNOSIS — Z72.0 TOBACCO ABUSE: ICD-10-CM

## 2023-05-04 PROCEDURE — 99213 OFFICE O/P EST LOW 20 MIN: CPT | Mod: PBBFAC | Performed by: GENERAL ACUTE CARE HOSPITAL

## 2023-05-04 PROCEDURE — 99214 PR OFFICE/OUTPT VISIT, EST, LEVL IV, 30-39 MIN: ICD-10-PCS | Mod: S$PBB,,, | Performed by: GENERAL ACUTE CARE HOSPITAL

## 2023-05-04 PROCEDURE — 99214 OFFICE O/P EST MOD 30 MIN: CPT | Mod: S$PBB,,, | Performed by: GENERAL ACUTE CARE HOSPITAL

## 2023-05-04 PROCEDURE — 99999 PR PBB SHADOW E&M-EST. PATIENT-LVL III: CPT | Mod: PBBFAC,,, | Performed by: GENERAL ACUTE CARE HOSPITAL

## 2023-05-04 PROCEDURE — 99999 PR PBB SHADOW E&M-EST. PATIENT-LVL III: ICD-10-PCS | Mod: PBBFAC,,, | Performed by: GENERAL ACUTE CARE HOSPITAL

## 2023-05-04 RX ORDER — METHIMAZOLE 5 MG/1
TABLET ORAL
Qty: 135 TABLET | Refills: 3 | Status: SHIPPED | OUTPATIENT
Start: 2023-05-04 | End: 2023-09-11

## 2023-05-04 RX ORDER — GLYBURIDE 5 MG/1
5 TABLET ORAL
COMMUNITY
Start: 2023-04-28 | End: 2023-09-11 | Stop reason: ALTCHOICE

## 2023-05-04 RX ORDER — HYDROCHLOROTHIAZIDE 50 MG/1
50 TABLET ORAL
COMMUNITY
Start: 2023-02-28

## 2023-05-04 NOTE — PROGRESS NOTES
Subjective:      Chief Complaint: hyperthyroidism    HPI: Mariam Bran is a 63 y.o. female who is here for follow up evaluation of Graves disease.  Pt today reports feels well and denies hyper or hypothyroid symptoms.           Further regarding Hyperthyroidism  - TRAb previously elevated at 17 consistent with Graves disease. Thyroid ultrasound obtained 08/09/2021 demonstrates subcentimeter nodules and normal vascularity.   - Methimazole therapy was started 08/08/2021      - On today's visit, she tells me she is taking methimazole 7.5 mg daily and is compliant with therapy    - She has no complaints today      - Current symptoms:       No   Yes    [x]    []   Weight loss    [x]    []   Fatigue    [x]    []   Diarrhea    [x]    []   Hair loss     [x]    []   Brittle nails    [x]    []   Mental fog    [x]    []   Heat intolerance    [x]    []   Memory impair    [x]    []   Muscle weakness    [x]    []   Neck swelling, pain, pressure    [x]    []   Lithium or Amiodarone use    [x]    []   Chemotherapy    [x]    []   Prior neck radiation    [x]    []   Recent severe illness        Family History:      No   Yes    [x]    []   Thyroid disorder     [x]    []   Thyroid cancer      Previous thyroid ultrasound done on 8.9.21  The thyroid is normal in size.  Right lobe of the thyroid measures 4.2 x 1.9 x 1.4 cm.  Left lobe of the thyroid measures 4.0 x 1.4 x 1.4 cm.  The isthmus is not thickened measuring 0.2 cm in AP diameter.  Thyroid parenchyma demonstrates normal vascularity.  Two right-sided subcentimeter thyroid nodules.  In the lateral right thyroid lobe superiorly there is a 0.3 x 0.5 x 0.4 cm (AP by TV by CC) isoechoic solid-appearing nodule (TR 3).  In the medial lower pole of the right thyroid lobe there is a 0.2 x 0.2 x 0.3 cm hypoechoic solid-appearing nodule (TR 4).  Left thyroid lobe demonstrates no nodules.  Cervical lymph nodes demonstrate normal morphology and size.    SUBCENTIMETER NODULES (DO NOT REQUIRE  [facility])       DEXA Scan May 2022:    FINDINGS:  Lumbar spine (L1-L4):            BMD is 1.199 g/cm2, T-score is 1.4, and Z-score is 2.9.  Total hip:                                BMD is 1.013 g/cm2, T-score is 0.6, and Z-score is 1.6.  Femoral neck:                        BMD is 0.846 g/cm2, T-score is 0.0, and Z-score is 1.3.  Distal 1/3 radius:                    BMD is 0.721 g/cm2, T-score is 0.4, and Z-score is 1.8.     FRAX:  12% risk of a major osteoporotic fracture in the next 10 years.  0.1% risk of hip fracture in the next 10 years.     Impression:  Elevated BMD of the lumbar spine.        Lab Results   Component Value Date    TSH 1.815 04/26/2023    TSH 4.024 (H) 02/27/2023    TSH 4.368 (H) 01/26/2023    FREET4 0.93 04/26/2023    FREET4 0.91 02/27/2023    FREET4 0.93 01/26/2023    THYROPEROXID <6.0 08/10/2021     Reviewed past medical, family, social history and updated as appropriate.    ROS:  as above      Objective:     There were no vitals filed for this visit.      BP Readings from Last 5 Encounters:   01/26/23 130/80   09/20/22 120/70   05/05/22 (!) 157/80   10/19/21 129/78   10/12/21 (!) 116/55       Physical Exam  Constitutional:       Appearance: She is obese.   HENT:      Head: Normocephalic.      Right Ear: External ear normal.      Left Ear: External ear normal.   Eyes:      Extraocular Movements: Extraocular movements intact.   Cardiovascular:      Rate and Rhythm: Normal rate.   Pulmonary:      Effort: Pulmonary effort is normal.   Musculoskeletal:         General: Normal range of motion.      Cervical back: Normal range of motion.   Skin:     General: Skin is warm.   Neurological:      General: No focal deficit present.      Mental Status: She is alert.   Psychiatric:         Mood and Affect: Mood normal.       Wt Readings from Last 10 Encounters:   01/26/23 1015 88.1 kg (194 lb 3.6 oz)   09/20/22 0923 86.4 kg (190 lb 5.9 oz)   05/05/22 0929 86.3 kg (190 lb 2.4 oz)   10/19/21 1303 70.5 kg (155  lb 8.6 oz)   10/12/21 1412 67.9 kg (149 lb 11.1 oz)   09/22/21 1538 60.3 kg (133 lb)   09/22/21 1437 61.9 kg (136 lb 7.4 oz)   08/08/21 1509 65.8 kg (145 lb 1 oz)   08/08/21 1024 65.8 kg (145 lb)   07/14/21 2035 68 kg (150 lb)   07/11/21 1449 68 kg (150 lb)       Lab Results   Component Value Date    HGBA1C 5.9 (H) 07/15/2021     Lab Results   Component Value Date    CHOL 105 (L) 06/09/2021    HDL 29 (L) 06/09/2021    LDLCALC 60.8 (L) 06/09/2021    TRIG 76 06/09/2021    CHOLHDL 27.6 06/09/2021     Lab Results   Component Value Date     09/25/2021    K 4.5 09/25/2021     09/25/2021    CO2 25 09/25/2021     09/25/2021    BUN 11 09/25/2021    CREATININE 0.6 09/25/2021    CALCIUM 8.7 09/25/2021    PROT 5.5 (L) 09/24/2021    ALBUMIN 2.3 (L) 09/24/2021    BILITOT 0.2 09/24/2021    ALKPHOS 118 09/24/2021    AST 17 09/24/2021    ALT 28 09/24/2021    ANIONGAP 9 09/25/2021    ESTGFRAFRICA >60.0 09/25/2021    EGFRNONAA >60.0 09/25/2021    TSH 1.815 04/26/2023      No results found for: MICALBCREAT    Assessment/Plan:       Graves disease    C/w Methimazole 7.5mg daily for n ow     TSH in 4 weeks     Will adjust dose based on TFTs     Follow up w/ Dr. Louis in 4 months.         Essential hypertension  Bp at goal on current meds     Low Na diet     Follow up w/ PCP

## 2023-05-04 NOTE — PATIENT INSTRUCTIONS
For now we will continue with Methimazole 1.5 tablets once a day.     We will check thyroid levels in 4 weeks.       I will contact you with results and next steps.     Follow up in 4 months with Dr. Louis.     If any questions feel free to contact me.     Have a nice day.     Sincerely,       Misael Patel MD   Endocrinology   5/4/2023 10:46 AM

## 2023-06-02 ENCOUNTER — LAB VISIT (OUTPATIENT)
Dept: LAB | Facility: HOSPITAL | Age: 63
End: 2023-06-02
Attending: GENERAL ACUTE CARE HOSPITAL
Payer: MEDICARE

## 2023-06-02 ENCOUNTER — TELEPHONE (OUTPATIENT)
Dept: ENDOCRINOLOGY | Facility: CLINIC | Age: 63
End: 2023-06-02
Payer: MEDICARE

## 2023-06-02 DIAGNOSIS — E05.00 GRAVES DISEASE: ICD-10-CM

## 2023-06-02 LAB — TSH SERPL DL<=0.005 MIU/L-ACNC: 3.12 UIU/ML (ref 0.4–4)

## 2023-06-02 PROCEDURE — 84443 ASSAY THYROID STIM HORMONE: CPT | Performed by: GENERAL ACUTE CARE HOSPITAL

## 2023-06-02 PROCEDURE — 36415 COLL VENOUS BLD VENIPUNCTURE: CPT | Performed by: GENERAL ACUTE CARE HOSPITAL

## 2023-06-02 NOTE — TELEPHONE ENCOUNTER
----- Message from Misael Carcamo MD sent at 6/2/2023  1:28 PM CDT -----  Please inform thyroid levels are at goal. She can continue current dose of methimazole 7.5mg once a day.     Thank you

## 2023-06-23 ENCOUNTER — LAB VISIT (OUTPATIENT)
Dept: LAB | Facility: HOSPITAL | Age: 63
End: 2023-06-23
Payer: MEDICARE

## 2023-06-23 DIAGNOSIS — E05.00 GRAVES DISEASE: ICD-10-CM

## 2023-06-23 LAB
T4 FREE SERPL-MCNC: 0.84 NG/DL (ref 0.71–1.51)
TSH SERPL DL<=0.005 MIU/L-ACNC: 4.15 UIU/ML (ref 0.4–4)

## 2023-06-23 PROCEDURE — 84443 ASSAY THYROID STIM HORMONE: CPT | Performed by: STUDENT IN AN ORGANIZED HEALTH CARE EDUCATION/TRAINING PROGRAM

## 2023-06-23 PROCEDURE — 36415 COLL VENOUS BLD VENIPUNCTURE: CPT | Performed by: STUDENT IN AN ORGANIZED HEALTH CARE EDUCATION/TRAINING PROGRAM

## 2023-06-23 PROCEDURE — 84439 ASSAY OF FREE THYROXINE: CPT | Performed by: STUDENT IN AN ORGANIZED HEALTH CARE EDUCATION/TRAINING PROGRAM

## 2023-07-07 ENCOUNTER — TELEPHONE (OUTPATIENT)
Dept: ENDOCRINOLOGY | Facility: CLINIC | Age: 63
End: 2023-07-07
Payer: MEDICARE

## 2023-07-07 DIAGNOSIS — E05.00 GRAVES DISEASE: Primary | ICD-10-CM

## 2023-07-07 NOTE — TELEPHONE ENCOUNTER
I spoke with Mariam Bran on the phone today about her recent thyroid labs.  Her TSH is just above 4 and Free T4 is in the low normal range.  She remains on 1.5 tabs of Methimazole a day (7.5 mg).  No concerning symptoms.  Discussed that we will keep same dose for a little bit longer and plan labs to be repeated early August and consider dose adjustment at that time if needed.

## 2023-08-07 ENCOUNTER — LAB VISIT (OUTPATIENT)
Dept: LAB | Facility: HOSPITAL | Age: 63
End: 2023-08-07
Payer: MEDICARE

## 2023-08-07 DIAGNOSIS — E05.00 GRAVES DISEASE: ICD-10-CM

## 2023-08-07 LAB
T4 FREE SERPL-MCNC: 0.83 NG/DL (ref 0.71–1.51)
TSH SERPL DL<=0.005 MIU/L-ACNC: 3.66 UIU/ML (ref 0.4–4)

## 2023-08-07 PROCEDURE — 36415 COLL VENOUS BLD VENIPUNCTURE: CPT | Performed by: STUDENT IN AN ORGANIZED HEALTH CARE EDUCATION/TRAINING PROGRAM

## 2023-08-07 PROCEDURE — 84443 ASSAY THYROID STIM HORMONE: CPT | Performed by: STUDENT IN AN ORGANIZED HEALTH CARE EDUCATION/TRAINING PROGRAM

## 2023-08-07 PROCEDURE — 84439 ASSAY OF FREE THYROXINE: CPT | Performed by: STUDENT IN AN ORGANIZED HEALTH CARE EDUCATION/TRAINING PROGRAM

## 2023-09-10 NOTE — PROGRESS NOTES
Subjective:      Chief Complaint: hyperthyroidism    HPI: Mariam Bran is a 63 y.o. female who is here for an evaluation for hyperthyroidism. She has a history of Graves Disease. Today she presents for a routine follow up.     Last visit was with Dr. Goodwin on 5/4/23, prior to that she has seen me before as well.        Interval History:  She has been compliant with her methimazole medicine.  She also had a diagnosis of melanoma skin cancer on her right arm s/p resection with dermatology in August 2023.  She is also due to have a lump under the skin of her left arm evaluated with imaging and possible resection soon.  She admits that she is still smoking - about half a pack a day.  She follows with her PCP for diabetes and is on Glyburide.  Has not been checking blood sugars and is uncertain of any lows while on current meds.  Diet is high is sugar/carbs she admits.         Further regarding Hyperthyroidism  - TRAb previously elevated at 17 consistent with Graves disease. Thyroid ultrasound obtained 08/09/2021 demonstrates subcentimeter nodules and normal vascularity.   - Methimazole therapy was started 08/08/2021  - On today's visit, she tells me she is taking methimazole 7.5 mg daily and is compliant with therapy.  Last dose adjustment was from 10 mg to 7.5 mg in January 2023  - She has no new complaints today  - Current symptoms: As below      No   Yes    [x]    []   Weight loss    [x]    []   Fatigue    [x]    []   Diarrhea    [x]    []   Hair loss     [x]    []   Brittle nails    [x]    []   Mental fog    [x]    []   Heat intolerance    []    [x]   Memory impair (subjective)    [x]    []   Muscle weakness    [x]    []   Neck swelling, pain, pressure    [x]    []   Lithium or Amiodarone use    [x]    []   Chemotherapy    [x]    []   Prior neck radiation    [x]    []   Recent severe illness        Family History:    No   Yes    [x]    []   Thyroid disorder     [x]    []   Thyroid cancer      Previous thyroid  ultrasound done on 8.9.21  The thyroid is normal in size.  Right lobe of the thyroid measures 4.2 x 1.9 x 1.4 cm.  Left lobe of the thyroid measures 4.0 x 1.4 x 1.4 cm.  The isthmus is not thickened measuring 0.2 cm in AP diameter.    Thyroid parenchyma demonstrates normal vascularity.  Two right-sided subcentimeter thyroid nodules.    In the lateral right thyroid lobe superiorly there is a 0.3 x 0.5 x 0.4 cm (AP by TV by CC) isoechoic solid-appearing nodule (TR 3).    In the medial lower pole of the right thyroid lobe there is a 0.2 x 0.2 x 0.3 cm hypoechoic solid-appearing nodule (TR 4).  Left thyroid lobe demonstrates no nodules.  Cervical lymph nodes demonstrate normal morphology and size.      DEXA Scan May 2022:    FINDINGS:  Lumbar spine (L1-L4):            BMD is 1.199 g/cm2, T-score is 1.4, and Z-score is 2.9.  Total hip:                                BMD is 1.013 g/cm2, T-score is 0.6, and Z-score is 1.6.  Femoral neck:                        BMD is 0.846 g/cm2, T-score is 0.0, and Z-score is 1.3.  Distal 1/3 radius:                    BMD is 0.721 g/cm2, T-score is 0.4, and Z-score is 1.8.     FRAX:  12% risk of a major osteoporotic fracture in the next 10 years.  0.1% risk of hip fracture in the next 10 years.     Impression:  Elevated BMD of the lumbar spine.        Lab Results   Component Value Date    TSH 3.660 08/07/2023    TSH 4.147 (H) 06/23/2023    TSH 3.118 06/02/2023    FREET4 0.83 08/07/2023    FREET4 0.84 06/23/2023    FREET4 0.93 04/26/2023    THYROPEROXID <6.0 08/10/2021     Regarding Diabetes:    Diagnosed with type 2 diabetes around 10 years.    Has been treated with her PCP previously      Treatment includes Glyburide 5 mg once daily - takes with morning coffee and without any food    Diet:  Eats one main meal a day in the evenings.  Drinks approx 8 regular Dr. Pepper drinks a day as well as sweet tea.  Frequently has sweets such as cupcakes.      Hypoglycemia:  Is unsure of hypoglycemia  as she has not been monitoring BG at home.  Some occasional symptoms of feeling unwell during the day along with fatigue.      Lab Results   Component Value Date    HGBA1C 5.9 (H) 07/15/2021     She is unsure of her most recent A1c level as her PCP orders labs outside of [facility].     Reviewed past medical, family, social history and updated as appropriate.    ROS:  as above      Objective:     There were no vitals filed for this visit.      BP Readings from Last 5 Encounters:   05/04/23 120/60   01/26/23 130/80   09/20/22 120/70   05/05/22 (!) 157/80   10/19/21 129/78       Physical Exam  Constitutional:       Appearance: She is obese.   HENT:      Head: Normocephalic.      Right Ear: External ear normal.      Left Ear: External ear normal.   Eyes:      Extraocular Movements: Extraocular movements intact.   Cardiovascular:      Rate and Rhythm: Normal rate.   Pulmonary:      Effort: Pulmonary effort is normal.   Musculoskeletal:         General: Normal range of motion.      Cervical back: Normal range of motion.   Skin:     General: Skin is warm.   Neurological:      General: No focal deficit present.      Mental Status: She is alert.   Psychiatric:         Mood and Affect: Mood normal.         Wt Readings from Last 10 Encounters:   05/04/23 1021 87 kg (191 lb 11 oz)   01/26/23 1015 88.1 kg (194 lb 3.6 oz)   09/20/22 0923 86.4 kg (190 lb 5.9 oz)   05/05/22 0929 86.3 kg (190 lb 2.4 oz)   10/19/21 1303 70.5 kg (155 lb 8.6 oz)   10/12/21 1412 67.9 kg (149 lb 11.1 oz)   09/22/21 1538 60.3 kg (133 lb)   09/22/21 1437 61.9 kg (136 lb 7.4 oz)   08/08/21 1509 65.8 kg (145 lb 1 oz)   08/08/21 1024 65.8 kg (145 lb)   07/14/21 2035 68 kg (150 lb)       Lab Results   Component Value Date    CHOL 105 (L) 06/09/2021    HDL 29 (L) 06/09/2021    LDLCALC 60.8 (L) 06/09/2021    TRIG 76 06/09/2021    CHOLHDL 27.6 06/09/2021     Lab Results   Component Value Date     09/25/2021    K 4.5 09/25/2021     09/25/2021    CO2 25  "09/25/2021     09/25/2021    BUN 11 09/25/2021    CREATININE 0.6 09/25/2021    CALCIUM 8.7 09/25/2021    PROT 5.5 (L) 09/24/2021    ALBUMIN 2.3 (L) 09/24/2021    BILITOT 0.2 09/24/2021    ALKPHOS 118 09/24/2021    AST 17 09/24/2021    ALT 28 09/24/2021    ANIONGAP 9 09/25/2021    ESTGFRAFRICA >60.0 09/25/2021    EGFRNONAA >60.0 09/25/2021    TSH 3.660 08/07/2023      No results found for: "MICALBCREAT"    Assessment/Plan:     Graves disease  Review of prior labs have showed TSH to either be high normal or just above reference range.  Also Free T4 has remained in the lower end of normal.  The current dose of methimazole is 7.5 mg and she has been compliant with this.  Clinically euthyroid without concerning symptoms.  She has been previously made aware of possible side effects of Methimazole therapy.      Plan:  - Decrease Methimazole from 7.5 to 5 mg once daily  - TSH and Free T4 labs in 1 month    Tobacco abuse  Continues to smoke approx 1/2 pack of cigarettes a day.  Encouraged cessation and she is aware of the risk of worsening of her Grave's including risk of thyroid eye disease.      Type 2 diabetes mellitus without complication, without long-term current use of insulin  Prior history of type 2 diabetes.  Originally labs diagnosing this not available to review.  Has been managed by her PCP but dietary habits have been concerning along with non-specific daytime symptoms of feeling unwell which would be from hypoglycemia given use of Glyburide.      Plan:  - Stop Glyburide   - Start Glimepiride 2 mg once daily  - Encouraged healthy diet including limiting sugar containing beverages (regular sodas and sweet tea)  - Glucometer sent to pharmacy along with lancets and test strips  - Encouraged monitoring of BG especially when symptoms of hypoglycemia occur  - Will check CMP and A1c in 1 month    Essential hypertension  SBP above goal on today's visit.  Continued on multi-drug regimen with PCP.  Encouraged " close monitoring with PCP for dose adjustments to medicines.  Is noted to be on ACEi.      Follow up 4 months    Nicholas Manuel DO [facility], [address]  [address] [place], [place], [address]    Office: [phone]  Fax: [phone]

## 2023-09-11 ENCOUNTER — OFFICE VISIT (OUTPATIENT)
Dept: ENDOCRINOLOGY | Facility: CLINIC | Age: 63
End: 2023-09-11
Payer: MEDICARE

## 2023-09-11 VITALS
WEIGHT: 192.13 LBS | OXYGEN SATURATION: 99 % | SYSTOLIC BLOOD PRESSURE: 152 MMHG | BODY MASS INDEX: 34.04 KG/M2 | HEART RATE: 78 BPM | DIASTOLIC BLOOD PRESSURE: 76 MMHG | HEIGHT: 63 IN

## 2023-09-11 DIAGNOSIS — E11.9 TYPE 2 DIABETES MELLITUS WITHOUT COMPLICATION, WITHOUT LONG-TERM CURRENT USE OF INSULIN: ICD-10-CM

## 2023-09-11 DIAGNOSIS — Z72.0 TOBACCO ABUSE: ICD-10-CM

## 2023-09-11 DIAGNOSIS — I10 ESSENTIAL HYPERTENSION: ICD-10-CM

## 2023-09-11 DIAGNOSIS — E05.00 GRAVES DISEASE: Primary | ICD-10-CM

## 2023-09-11 PROCEDURE — 1159F MED LIST DOCD IN RCRD: CPT | Mod: HCNC,CPTII,GC,S$GLB | Performed by: STUDENT IN AN ORGANIZED HEALTH CARE EDUCATION/TRAINING PROGRAM

## 2023-09-11 PROCEDURE — 1159F PR MEDICATION LIST DOCUMENTED IN MEDICAL RECORD: ICD-10-PCS | Mod: HCNC,CPTII,GC,S$GLB | Performed by: STUDENT IN AN ORGANIZED HEALTH CARE EDUCATION/TRAINING PROGRAM

## 2023-09-11 PROCEDURE — 3008F BODY MASS INDEX DOCD: CPT | Mod: HCNC,CPTII,GC,S$GLB | Performed by: STUDENT IN AN ORGANIZED HEALTH CARE EDUCATION/TRAINING PROGRAM

## 2023-09-11 PROCEDURE — 3078F DIAST BP <80 MM HG: CPT | Mod: HCNC,CPTII,GC,S$GLB | Performed by: STUDENT IN AN ORGANIZED HEALTH CARE EDUCATION/TRAINING PROGRAM

## 2023-09-11 PROCEDURE — 99999 PR PBB SHADOW E&M-EST. PATIENT-LVL III: ICD-10-PCS | Mod: PBBFAC,HCNC,GC, | Performed by: STUDENT IN AN ORGANIZED HEALTH CARE EDUCATION/TRAINING PROGRAM

## 2023-09-11 PROCEDURE — 3078F PR MOST RECENT DIASTOLIC BLOOD PRESSURE < 80 MM HG: ICD-10-PCS | Mod: HCNC,CPTII,GC,S$GLB | Performed by: STUDENT IN AN ORGANIZED HEALTH CARE EDUCATION/TRAINING PROGRAM

## 2023-09-11 PROCEDURE — 3077F PR MOST RECENT SYSTOLIC BLOOD PRESSURE >= 140 MM HG: ICD-10-PCS | Mod: HCNC,CPTII,GC,S$GLB | Performed by: STUDENT IN AN ORGANIZED HEALTH CARE EDUCATION/TRAINING PROGRAM

## 2023-09-11 PROCEDURE — 99214 PR OFFICE/OUTPT VISIT, EST, LEVL IV, 30-39 MIN: ICD-10-PCS | Mod: HCNC,GC,S$GLB, | Performed by: STUDENT IN AN ORGANIZED HEALTH CARE EDUCATION/TRAINING PROGRAM

## 2023-09-11 PROCEDURE — 99999 PR PBB SHADOW E&M-EST. PATIENT-LVL III: CPT | Mod: PBBFAC,HCNC,GC, | Performed by: STUDENT IN AN ORGANIZED HEALTH CARE EDUCATION/TRAINING PROGRAM

## 2023-09-11 PROCEDURE — 1160F PR REVIEW ALL MEDS BY PRESCRIBER/CLIN PHARMACIST DOCUMENTED: ICD-10-PCS | Mod: HCNC,CPTII,GC,S$GLB | Performed by: STUDENT IN AN ORGANIZED HEALTH CARE EDUCATION/TRAINING PROGRAM

## 2023-09-11 PROCEDURE — 3008F PR BODY MASS INDEX (BMI) DOCUMENTED: ICD-10-PCS | Mod: HCNC,CPTII,GC,S$GLB | Performed by: STUDENT IN AN ORGANIZED HEALTH CARE EDUCATION/TRAINING PROGRAM

## 2023-09-11 PROCEDURE — 1160F RVW MEDS BY RX/DR IN RCRD: CPT | Mod: HCNC,CPTII,GC,S$GLB | Performed by: STUDENT IN AN ORGANIZED HEALTH CARE EDUCATION/TRAINING PROGRAM

## 2023-09-11 PROCEDURE — 99214 OFFICE O/P EST MOD 30 MIN: CPT | Mod: HCNC,GC,S$GLB, | Performed by: STUDENT IN AN ORGANIZED HEALTH CARE EDUCATION/TRAINING PROGRAM

## 2023-09-11 PROCEDURE — 3077F SYST BP >= 140 MM HG: CPT | Mod: HCNC,CPTII,GC,S$GLB | Performed by: STUDENT IN AN ORGANIZED HEALTH CARE EDUCATION/TRAINING PROGRAM

## 2023-09-11 RX ORDER — LANCETS
EACH MISCELLANEOUS
Qty: 100 EACH | Refills: 0 | Status: SHIPPED | OUTPATIENT
Start: 2023-09-11

## 2023-09-11 RX ORDER — METHIMAZOLE 5 MG/1
5 TABLET ORAL DAILY
Qty: 90 TABLET | Refills: 3
Start: 2023-09-11

## 2023-09-11 RX ORDER — GLIMEPIRIDE 2 MG/1
2 TABLET ORAL
Qty: 90 TABLET | Refills: 3 | Status: SHIPPED | OUTPATIENT
Start: 2023-09-11 | End: 2024-09-10

## 2023-09-11 RX ORDER — INSULIN PUMP SYRINGE, 3 ML
EACH MISCELLANEOUS
Qty: 1 EACH | Refills: 0 | Status: SHIPPED | OUTPATIENT
Start: 2023-09-11 | End: 2024-09-10

## 2023-09-11 NOTE — ASSESSMENT & PLAN NOTE
Continues to smoke approx 1/2 pack of cigarettes a day.  Encouraged cessation and she is aware of the risk of worsening of her Grave's including risk of thyroid eye disease.

## 2023-09-11 NOTE — ASSESSMENT & PLAN NOTE
Review of prior labs have showed TSH to either be high normal or just above reference range.  Also Free T4 has remained in the lower end of normal.  The current dose of methimazole is 7.5 mg and she has been compliant with this.  Clinically euthyroid without concerning symptoms.  She has been previously made aware of possible side effects of Methimazole therapy.      Plan:  - Decrease Methimazole from 7.5 to 5 mg once daily  - TSH and Free T4 labs in 1 month

## 2023-09-11 NOTE — PATIENT INSTRUCTIONS
We would encourage you to start checking your blood sugars during the day with your glucose meter.  You should definitely check your sugar when you are feeling bad during the day to be sure you are not having low glucose levels.  Lows are less than 70.      We want you to switch from the Glyburide once daily to a new pill.  The new medicine is Glimeperide (Amaryl) and you will take this once a day instead.      We would encourage you to switch from the regular sodas to diet sodas.  You should also limit your intake of sweets.  Try to drink diet tea as well - the yellow colored Milo's tea gallons jugs are sugar free and a good option.    We will plan to check labs in 1 month with your thyroid labs and also an A1c level and a metabolic panel to check you kidney function and electrolytes.      I will contact you to let you know about the labs at that time.

## 2023-09-11 NOTE — ASSESSMENT & PLAN NOTE
Prior history of type 2 diabetes.  Originally labs diagnosing this not available to review.  Has been managed by her PCP but dietary habits have been concerning along with non-specific daytime symptoms of feeling unwell which would be from hypoglycemia given use of Glyburide.      Plan:  - Stop Glyburide   - Start Glimepiride 2 mg once daily  - Encouraged healthy diet including limiting sugar containing beverages (regular sodas and sweet tea)  - Glucometer sent to pharmacy along with lancets and test strips  - Encouraged monitoring of BG especially when symptoms of hypoglycemia occur  - Will check CMP and A1c in 1 month

## 2023-09-11 NOTE — PROGRESS NOTES
I have reviewed and concur with Dr. Louis's history, physical, assessment, and plan.  I have personally interviewed and examined the patient.      Mariam Sanchez MD

## 2023-09-11 NOTE — ASSESSMENT & PLAN NOTE
SBP above goal on today's visit.  Continued on multi-drug regimen with PCP.  Encouraged close monitoring with PCP for dose adjustments to medicines.  Is noted to be on ACEi.

## 2023-10-11 ENCOUNTER — LAB VISIT (OUTPATIENT)
Dept: LAB | Facility: HOSPITAL | Age: 63
End: 2023-10-11
Payer: MEDICARE

## 2023-10-11 DIAGNOSIS — E05.00 GRAVES DISEASE: ICD-10-CM

## 2023-10-11 DIAGNOSIS — E11.9 TYPE 2 DIABETES MELLITUS WITHOUT COMPLICATION, WITHOUT LONG-TERM CURRENT USE OF INSULIN: ICD-10-CM

## 2023-10-11 LAB
ALBUMIN SERPL BCP-MCNC: 4 G/DL (ref 3.5–5.2)
ALP SERPL-CCNC: 92 U/L (ref 55–135)
ALT SERPL W/O P-5'-P-CCNC: 22 U/L (ref 10–44)
ANION GAP SERPL CALC-SCNC: 12 MMOL/L (ref 8–16)
AST SERPL-CCNC: 18 U/L (ref 10–40)
BILIRUB SERPL-MCNC: 0.3 MG/DL (ref 0.1–1)
BUN SERPL-MCNC: 17 MG/DL (ref 8–23)
CALCIUM SERPL-MCNC: 9.3 MG/DL (ref 8.7–10.5)
CHLORIDE SERPL-SCNC: 104 MMOL/L (ref 95–110)
CO2 SERPL-SCNC: 25 MMOL/L (ref 23–29)
CREAT SERPL-MCNC: 0.7 MG/DL (ref 0.5–1.4)
EST. GFR  (NO RACE VARIABLE): >60 ML/MIN/1.73 M^2
ESTIMATED AVG GLUCOSE: 123 MG/DL (ref 68–131)
GLUCOSE SERPL-MCNC: 80 MG/DL (ref 70–110)
HBA1C MFR BLD: 5.9 % (ref 4–5.6)
POTASSIUM SERPL-SCNC: 4.2 MMOL/L (ref 3.5–5.1)
PROT SERPL-MCNC: 7.4 G/DL (ref 6–8.4)
SODIUM SERPL-SCNC: 141 MMOL/L (ref 136–145)
T4 FREE SERPL-MCNC: 0.92 NG/DL (ref 0.71–1.51)
TSH SERPL DL<=0.005 MIU/L-ACNC: 2.09 UIU/ML (ref 0.4–4)

## 2023-10-11 PROCEDURE — 83036 HEMOGLOBIN GLYCOSYLATED A1C: CPT | Mod: HCNC | Performed by: STUDENT IN AN ORGANIZED HEALTH CARE EDUCATION/TRAINING PROGRAM

## 2023-10-11 PROCEDURE — 80053 COMPREHEN METABOLIC PANEL: CPT | Mod: HCNC | Performed by: STUDENT IN AN ORGANIZED HEALTH CARE EDUCATION/TRAINING PROGRAM

## 2023-10-11 PROCEDURE — 84443 ASSAY THYROID STIM HORMONE: CPT | Mod: HCNC | Performed by: STUDENT IN AN ORGANIZED HEALTH CARE EDUCATION/TRAINING PROGRAM

## 2023-10-11 PROCEDURE — 36415 COLL VENOUS BLD VENIPUNCTURE: CPT | Mod: HCNC | Performed by: STUDENT IN AN ORGANIZED HEALTH CARE EDUCATION/TRAINING PROGRAM

## 2023-10-11 PROCEDURE — 84439 ASSAY OF FREE THYROXINE: CPT | Mod: HCNC | Performed by: STUDENT IN AN ORGANIZED HEALTH CARE EDUCATION/TRAINING PROGRAM

## 2023-10-13 ENCOUNTER — TELEPHONE (OUTPATIENT)
Dept: ENDOCRINOLOGY | Facility: CLINIC | Age: 63
End: 2023-10-13
Payer: MEDICARE

## 2023-10-13 DIAGNOSIS — E05.00 GRAVES DISEASE: Primary | ICD-10-CM

## 2023-10-13 NOTE — TELEPHONE ENCOUNTER
I attempted to call Mariam Bran on the phone to review her labs but she did not answer so a voicemail was left discussing plans as follows:    Will reduce Methimazole to 2.5 mg (1/2 a tab a day)  Will repeat labs in 2-3 months before next clinic visit (TSH and TRAb)  Follow up at upcoming visit and further adjustments versus stopping Methimazole at that time based on labs.

## 2023-11-06 NOTE — TELEPHONE ENCOUNTER
----- Message from Ellyn Desir MA sent at 11/6/2023  7:36 AM CST -----  Regarding: FW: question  Contact: @ [phone]    ----- Message -----  From: Erinn Marti  Sent: 11/3/2023   1:53 PM CST  To: Heriberto Bhakta  Subject: question                                         Pt called in regards to her medication she stated that the same one she original has the same name as the new one and not sure how to take it    .....Please call and adv @ [phone]

## 2023-11-21 ENCOUNTER — HOSPITAL ENCOUNTER (EMERGENCY)
Facility: HOSPITAL | Age: 63
Discharge: HOME | End: 2023-11-21
Attending: STUDENT IN AN ORGANIZED HEALTH CARE EDUCATION/TRAINING PROGRAM
Payer: MEDICARE

## 2023-11-21 VITALS
SYSTOLIC BLOOD PRESSURE: 139 MMHG | RESPIRATION RATE: 17 BRPM | TEMPERATURE: 98 F | WEIGHT: 192 LBS | BODY MASS INDEX: 34.02 KG/M2 | HEART RATE: 89 BPM | OXYGEN SATURATION: 96 % | DIASTOLIC BLOOD PRESSURE: 64 MMHG | HEIGHT: 63 IN

## 2023-11-21 DIAGNOSIS — M79.602 PAIN OF LEFT UPPER EXTREMITY: Primary | ICD-10-CM

## 2023-11-21 DIAGNOSIS — R22.32 MASS OF LEFT UPPER EXTREMITY: ICD-10-CM

## 2023-11-21 PROCEDURE — 99281 EMR DPT VST MAYX REQ PHY/QHP: CPT | Mod: HCNC

## 2023-11-22 NOTE — ED NOTES
Pt ambulatory to ED for LUE pain x 3 months.  Pt states she has 3 hard lumps to LUE that are painful. Pt states LUE ROM limited due to pain.  LUE peripheral pulses and neuro intact, pt denies numbness or tingling.  No obvious swelling or redness present.  Pt denies injury/trauma to LUE.

## 2023-11-22 NOTE — ED PROVIDER NOTES
"Encounter Date: 11/21/2023       History     Chief Complaint   Patient presents with    Arm Injury     Had "cancerous mole" removed approx 3-4mo9s ago. Pt reporting two "painful" knots to proximal lateral LUE. Denies fevers, chills, or trauma     63-year-old female with history of Graves disease and melanoma presents to the ED regarding left arm pain onset 2 months.  Patient states 3 months ago she had an abnormal mole removed from her right arm which turned out to be melanoma.  Has no issues with right arm and wound healed appropriately.  She states she gets "bumps" intermittently all over her body.  She now has to "bumps" to her left upper arm that are now causing her pain.  States she has Dermatology appointment on the 27th for removal of these "bumps".  Denies fever or any systemic symptoms.    The history is provided by the patient and medical records.     Review of patient's allergies indicates:  No Known Allergies  Past Medical History:   Diagnosis Date    Acute colitis 6/25/2021    Graves disease     Hyperthyroidism     S/P laparoscopic cholecystectomy 6-6-2021 6/24/2021     Past Surgical History:   Procedure Laterality Date    LAPAROSCOPIC CHOLECYSTECTOMY N/A 6/6/2021    Procedure: CHOLECYSTECTOMY, LAPAROSCOPIC;  Surgeon: Sandy Delaney MD;  Location: [facility] OR [address] [place] [address];  Service: General;  Laterality: N/A;     History reviewed. No pertinent family history.  Social History     Tobacco Use    Smoking status: Every Day     Types: Cigarettes    Smokeless tobacco: Never    Tobacco comments:     quit 5/2021   Substance Use Topics    Alcohol use: Never    Drug use: Never     Review of Systems   Constitutional:  Negative for fever.   HENT:  Negative for sore throat.    Respiratory:  Negative for shortness of breath.    Cardiovascular:  Negative for chest pain.   Gastrointestinal:  Negative for nausea.   Genitourinary:  Negative for dysuria.   Musculoskeletal:  Negative for back pain.   Skin:  Positive for " wound. Negative for rash.   Neurological:  Negative for weakness.   Hematological:  Does not bruise/bleed easily.       Physical Exam     Initial Vitals [11/21/23 1713]   BP Pulse Resp Temp SpO2   (!) 176/88 105 18 100.3 °F (37.9 °C) 97 %      MAP       --         Physical Exam    Vitals reviewed.  Constitutional: She appears well-developed and well-nourished. She is not diaphoretic. No distress.   HENT:   Head: Normocephalic and atraumatic.   Neck: Neck supple.   Cardiovascular:  Normal rate.           Pulmonary/Chest: No respiratory distress.   Musculoskeletal:      Right upper arm: No swelling, edema or tenderness.      Left upper arm: No tenderness.      Cervical back: Neck supple.      Comments: Well-healed scar to right upper arm with no erythema or tenderness.  2 masses palpated to left upper arm with no edema, erythema, induration, or fluctuance.     Neurological: She is alert and oriented to person, place, and time. She has normal strength. No sensory deficit.   Psychiatric: She has a normal mood and affect.         ED Course   Procedures  Labs Reviewed - No data to display       Imaging Results    None          Medications - No data to display  Medical Decision Making       APC / Resident Notes:   63-year-old female with history of Graves disease and melanoma presents to the ED regarding left arm pain onset 2 months.  Vitals stable.  Clinically well-appearing, in no acute distress. Two masses palpated to left upper arm with no tenderness or skin changes.  No signs of infection.  Suspect lipomas.  No imaging warranted at this time.    My differential diagnoses include but are not limited to:   Lipoma, mass, cancer, cyst    Advised patient to follow up with dermatologist in 6 days. Strict ED return precautions given.  Patient verbalized understanding and agreed to plan.  Vitals stable and safe for discharge.  I have reviewed the patient's records and discussed with my supervising physician.                             Clinical Impression:  Final diagnoses:  [M79.602] Pain of left upper extremity (Primary)  [R22.32] Mass of left upper extremity          ED Disposition Condition    Discharge Stable          ED Prescriptions    None       Follow-up Information       Follow up With Specialties Details Why Contact Info    Alexsander Baxter, NP Nephrology Schedule an appointment as soon as possible for a visit   [address]  [address]  [facility] [address]  [phone]      Your dermatologist  Go on 11/27/2023 as scheduled     [facility] Emergency Medicine Go to  If symptoms worsen [address]  [facility] [address]  [phone]             Fior Pablo PA-C  11/21/23 1942

## 2024-01-04 ENCOUNTER — LAB VISIT (OUTPATIENT)
Dept: LAB | Facility: HOSPITAL | Age: 64
End: 2024-01-04
Payer: MEDICARE

## 2024-01-04 DIAGNOSIS — E05.00 GRAVES DISEASE: ICD-10-CM

## 2024-01-04 LAB — TSH SERPL DL<=0.005 MIU/L-ACNC: 0.97 UIU/ML (ref 0.4–4)

## 2024-01-04 PROCEDURE — 36415 COLL VENOUS BLD VENIPUNCTURE: CPT | Mod: HCNC | Performed by: STUDENT IN AN ORGANIZED HEALTH CARE EDUCATION/TRAINING PROGRAM

## 2024-01-04 PROCEDURE — 84443 ASSAY THYROID STIM HORMONE: CPT | Mod: HCNC | Performed by: STUDENT IN AN ORGANIZED HEALTH CARE EDUCATION/TRAINING PROGRAM

## 2024-01-04 PROCEDURE — 83520 IMMUNOASSAY QUANT NOS NONAB: CPT | Mod: HCNC | Performed by: STUDENT IN AN ORGANIZED HEALTH CARE EDUCATION/TRAINING PROGRAM

## 2024-01-05 LAB — TSH RECEP AB SER-ACNC: 9.11 IU/L (ref 0–1.75)

## 2024-01-08 ENCOUNTER — OFFICE VISIT (OUTPATIENT)
Dept: ENDOCRINOLOGY | Facility: CLINIC | Age: 64
End: 2024-01-08
Payer: MEDICARE

## 2024-01-08 VITALS
DIASTOLIC BLOOD PRESSURE: 78 MMHG | HEIGHT: 63 IN | BODY MASS INDEX: 33.25 KG/M2 | HEART RATE: 89 BPM | SYSTOLIC BLOOD PRESSURE: 142 MMHG | OXYGEN SATURATION: 98 % | WEIGHT: 187.63 LBS

## 2024-01-08 DIAGNOSIS — I10 ESSENTIAL HYPERTENSION: ICD-10-CM

## 2024-01-08 DIAGNOSIS — E11.9 TYPE 2 DIABETES MELLITUS WITHOUT COMPLICATION, WITHOUT LONG-TERM CURRENT USE OF INSULIN: Primary | ICD-10-CM

## 2024-01-08 DIAGNOSIS — Z72.0 TOBACCO ABUSE: ICD-10-CM

## 2024-01-08 DIAGNOSIS — E05.00 GRAVES DISEASE: ICD-10-CM

## 2024-01-08 PROCEDURE — 1160F RVW MEDS BY RX/DR IN RCRD: CPT | Mod: HCNC,CPTII,GC,S$GLB | Performed by: STUDENT IN AN ORGANIZED HEALTH CARE EDUCATION/TRAINING PROGRAM

## 2024-01-08 PROCEDURE — 99999 PR PBB SHADOW E&M-EST. PATIENT-LVL III: CPT | Mod: PBBFAC,HCNC,GC, | Performed by: STUDENT IN AN ORGANIZED HEALTH CARE EDUCATION/TRAINING PROGRAM

## 2024-01-08 PROCEDURE — 1159F MED LIST DOCD IN RCRD: CPT | Mod: HCNC,CPTII,GC,S$GLB | Performed by: STUDENT IN AN ORGANIZED HEALTH CARE EDUCATION/TRAINING PROGRAM

## 2024-01-08 PROCEDURE — 3008F BODY MASS INDEX DOCD: CPT | Mod: HCNC,CPTII,GC,S$GLB | Performed by: STUDENT IN AN ORGANIZED HEALTH CARE EDUCATION/TRAINING PROGRAM

## 2024-01-08 PROCEDURE — 3078F DIAST BP <80 MM HG: CPT | Mod: HCNC,CPTII,GC,S$GLB | Performed by: STUDENT IN AN ORGANIZED HEALTH CARE EDUCATION/TRAINING PROGRAM

## 2024-01-08 PROCEDURE — 99214 OFFICE O/P EST MOD 30 MIN: CPT | Mod: HCNC,GC,S$GLB, | Performed by: STUDENT IN AN ORGANIZED HEALTH CARE EDUCATION/TRAINING PROGRAM

## 2024-01-08 PROCEDURE — 3077F SYST BP >= 140 MM HG: CPT | Mod: HCNC,CPTII,GC,S$GLB | Performed by: STUDENT IN AN ORGANIZED HEALTH CARE EDUCATION/TRAINING PROGRAM

## 2024-01-08 NOTE — PATIENT INSTRUCTIONS
As discussed we would like you to stay on the Glimepiride 2 mg once a day.  If you have issues where you feel bad please check your blood sugar.  We may need to look at cutting back the dose in the future if you are having lows frequently.  The other option would be if you cut back on the soda even further we may be able to hold that medicine all together.  For blood sugars lows are when they are less than 70.    For your thyroid medicine please continue on the 1/2 a tablet of the Methimazole a day for now.  We will repeat the labs in 6 months.      We can plan to see you again in 12 months.

## 2024-01-08 NOTE — ASSESSMENT & PLAN NOTE
Continues to smoke approx 1/4 pack of cigarettes a day.  Encouraged further cessation and she was previously made aware of the risk of worsening of her Grave's including risk of thyroid eye disease.

## 2024-01-08 NOTE — PROGRESS NOTES
ENDOCRINE FOLLOW UP VISIT: 01/08/2024    Subjective:      Chief Complaint: hyperthyroidism    HPI: Mariam Bran is a 63 y.o. female who is here for an evaluation for hyperthyroidism. She has a history of Graves Disease. Today she presents for a routine follow up.     Interval History:  Last visit was 9/11/2023. On that visit she had her Glyburide adjusted to Glimiperide.  She also had Methimazole dose decreased from 7.5 to 5 mg.  Another decreased in October to 2.5 mg.  TSH and TRAb recently repeated.  She only eats dinner she states but has been taking her Glimepiride.  The lowest blood sugar she has had was 87 about a month after seeing us in the clinic in October 2023.  Other glucose values she has checked have been in the 90's at random times during the day.  She has cut back on soda some but still has 4 a day (was 8).        Further regarding Hyperthyroidism  - TRAb elevated consistent with Graves disease. Thyroid ultrasound obtained 08/09/2021 demonstrates subcentimeter nodules and normal vascularity.   - Methimazole therapy was started 08/08/2021  - On today's visit, she tells me she is taking methimazole 2.5 mg daily and is compliant with therapy.  Last dose adjustment was from 5 mg to 2.5 mg in October 2023  - She has no new complaints today  - Current symptoms: As below      No   Yes    [x]    []   Weight loss    [x]    []   Fatigue    [x]    []   Diarrhea    [x]    []   Hair loss     [x]    []   Brittle nails    [x]    []   Mental fog    [x]    []   Heat intolerance    []    [x]   Memory impair (subjective)    [x]    []   Muscle weakness    [x]    []   Neck swelling, pain, pressure    [x]    []   Lithium or Amiodarone use    [x]    []   Chemotherapy    [x]    []   Prior neck radiation    [x]    []   Recent severe illness        Family History:    No   Yes    [x]    []   Thyroid disorder     [x]    []   Thyroid cancer      Previous thyroid ultrasound done on 8.9.21  The thyroid is normal in size.  Right  lobe of the thyroid measures 4.2 x 1.9 x 1.4 cm.  Left lobe of the thyroid measures 4.0 x 1.4 x 1.4 cm.  The isthmus is not thickened measuring 0.2 cm in AP diameter.    Thyroid parenchyma demonstrates normal vascularity.  Two right-sided subcentimeter thyroid nodules.    In the lateral right thyroid lobe superiorly there is a 0.3 x 0.5 x 0.4 cm (AP by TV by CC) isoechoic solid-appearing nodule (TR 3).    In the medial lower pole of the right thyroid lobe there is a 0.2 x 0.2 x 0.3 cm hypoechoic solid-appearing nodule (TR 4).  Left thyroid lobe demonstrates no nodules.  Cervical lymph nodes demonstrate normal morphology and size.      DEXA Scan May 2022:    FINDINGS:  Lumbar spine (L1-L4):            BMD is 1.199 g/cm2, T-score is 1.4, and Z-score is 2.9.  Total hip:                                BMD is 1.013 g/cm2, T-score is 0.6, and Z-score is 1.6.  Femoral neck:                        BMD is 0.846 g/cm2, T-score is 0.0, and Z-score is 1.3.  Distal 1/3 radius:                    BMD is 0.721 g/cm2, T-score is 0.4, and Z-score is 1.8.     FRAX:  12% risk of a major osteoporotic fracture in the next 10 years.  0.1% risk of hip fracture in the next 10 years.     Impression:  Elevated BMD of the lumbar spine.        Lab Results   Component Value Date    TSH 0.969 01/04/2024    TSH 2.088 10/11/2023    TSH 3.660 08/07/2023    FREET4 0.92 10/11/2023    FREET4 0.83 08/07/2023    FREET4 0.84 06/23/2023    THYROPEROXID <6.0 08/10/2021     Regarding Diabetes:    Diagnosed with type 2 diabetes around 10 years.    Has been treated with her PCP previously      Treatment includes Glimepiride 2 mg once daily - takes with morning coffee and without any food    Diet:  Eats one main meal a day in the evenings.  Drinks approx 4 regular Dr. Pepper drinks a day as well as sweet tea (Peak brand).  Frequently has sweets such as cupcakes.      Hypoglycemia:  None recently.  Will occasionally check BG when she feels unwell but lowest BG  has been 87.        Lab Results   Component Value Date    HGBA1C 5.9 (H) 10/11/2023     She is unsure of her most recent A1c level as her PCP orders labs outside of [facility].     Reviewed past medical, family, social history and updated as appropriate.    ROS:  as above    Objective:     Vitals:    01/08/24 0900   BP: (!) 142/78   Pulse: 89         BP Readings from Last 5 Encounters:   01/08/24 (!) 142/78   11/21/23 139/64   09/11/23 (!) 152/76   05/04/23 120/60   01/26/23 130/80       Physical Exam  HENT:      Head: Normocephalic.      Right Ear: External ear normal.      Left Ear: External ear normal.   Eyes:      Extraocular Movements: Extraocular movements intact.   Cardiovascular:      Rate and Rhythm: Normal rate.   Pulmonary:      Effort: Pulmonary effort is normal.   Musculoskeletal:         General: Normal range of motion.      Cervical back: Normal range of motion.   Skin:     General: Skin is warm.   Neurological:      General: No focal deficit present.      Mental Status: She is alert.   Psychiatric:         Mood and Affect: Mood normal.         Wt Readings from Last 10 Encounters:   01/08/24 0900 85.1 kg (187 lb 9.8 oz)   11/21/23 1713 87.1 kg (192 lb 0.3 oz)   09/11/23 1017 87.1 kg (192 lb 2.1 oz)   05/04/23 1021 87 kg (191 lb 11 oz)   01/26/23 1015 88.1 kg (194 lb 3.6 oz)   09/20/22 0923 86.4 kg (190 lb 5.9 oz)   05/05/22 0929 86.3 kg (190 lb 2.4 oz)   10/19/21 1303 70.5 kg (155 lb 8.6 oz)   10/12/21 1412 67.9 kg (149 lb 11.1 oz)   09/22/21 1538 60.3 kg (133 lb)       Lab Results   Component Value Date    CHOL 105 (L) 06/09/2021    HDL 29 (L) 06/09/2021    LDLCALC 60.8 (L) 06/09/2021    TRIG 76 06/09/2021    CHOLHDL 27.6 06/09/2021     Lab Results   Component Value Date     10/11/2023    K 4.2 10/11/2023     10/11/2023    CO2 25 10/11/2023    GLU 80 10/11/2023    BUN 17 10/11/2023    CREATININE 0.7 10/11/2023    CALCIUM 9.3 10/11/2023    PROT 7.4 10/11/2023    ALBUMIN 4.0 10/11/2023     "BILITOT 0.3 10/11/2023    ALKPHOS 92 10/11/2023    AST 18 10/11/2023    ALT 22 10/11/2023    ANIONGAP 12 10/11/2023    ESTGFRAFRICA >60.0 09/25/2021    EGFRNONAA >60.0 09/25/2021    TSH 0.969 01/04/2024      No results found for: "MICALBCREAT"    Assessment/Plan:     Type 2 diabetes mellitus without complication, without long-term current use of insulin  Prior history of type 2 diabetes.  Originally labs diagnosing this not available to review.  Has been managed by her PCP in past but recently adjustments made here in the clinic.  No BG logs to review but reportedly all BG values have been 85 or higher.      Plan:  - Continue Glimepiride 2 mg once daily  - Encouraged healthy diet including limiting sugar containing beverages (regular sodas and sweet tea)  - Encouraged monitoring of BG especially when symptoms of hypoglycemia occur  - Will check A1c again in 6 months    If she has concerned for lower BG levels under 70 on multiple occasions then we will consider adjusting her therapy.  Also if she is able to come off sugar sweetened beverages all together she may be able to come off antihyperglycemic agents all together    Graves disease  Review of labs and symptoms have been stable.  Clinically euthyroid without concerning symptoms.  She has been previously made aware of possible side effects of Methimazole therapy.  Stable on current dose and TRAb is still detectable with TSH in the lower normal range suggesting that we will not be able to wean at this time.       Plan:  - Continue Methimazole 2.5 mg once daily  - TSH in 6 months    Tobacco abuse  Continues to smoke approx 1/4 pack of cigarettes a day.  Encouraged further cessation and she was previously made aware of the risk of worsening of her Grave's including risk of thyroid eye disease.      Essential hypertension  SBP above goal on today's visit.  Continued on multi-drug regimen with PCP.  Encouraged close monitoring with PCP for dose adjustments to " medicines.  Is noted to be on ACEi.         Follow up 12 months, labs in 6 months    Nicholas Manuel DO [facility], [address]  [address] [place], [place], [address]    Office: [phone]  Fax: [phone]

## 2024-01-08 NOTE — ASSESSMENT & PLAN NOTE
Review of labs and symptoms have been stable.  Clinically euthyroid without concerning symptoms.  She has been previously made aware of possible side effects of Methimazole therapy.  Stable on current dose and TRAb is still detectable with TSH in the lower normal range suggesting that we will not be able to wean at this time.       Plan:  - Continue Methimazole 2.5 mg once daily  - TSH in 6 months

## 2024-01-08 NOTE — ASSESSMENT & PLAN NOTE
Prior history of type 2 diabetes.  Originally labs diagnosing this not available to review.  Has been managed by her PCP in past but recently adjustments made here in the clinic.  No BG logs to review but reportedly all BG values have been 85 or higher.      Plan:  - Continue Glimepiride 2 mg once daily  - Encouraged healthy diet including limiting sugar containing beverages (regular sodas and sweet tea)  - Encouraged monitoring of BG especially when symptoms of hypoglycemia occur  - Will check A1c again in 6 months    If she has concerned for lower BG levels under 70 on multiple occasions then we will consider adjusting her therapy.  Also if she is able to come off sugar sweetened beverages all together she may be able to come off antihyperglycemic agents all together

## 2024-02-27 ENCOUNTER — TELEPHONE (OUTPATIENT)
Dept: VASCULAR SURGERY | Facility: CLINIC | Age: 64
End: 2024-02-27
Payer: MEDICARE

## 2024-02-27 ENCOUNTER — TELEPHONE (OUTPATIENT)
Dept: DERMATOLOGY | Facility: CLINIC | Age: 64
End: 2024-02-27
Payer: MEDICARE

## 2024-02-27 NOTE — TELEPHONE ENCOUNTER
Spoke w/ patient - offered acute derm appt in March for 2 concerning lesions. Pt decided to keep appt in June with Dr. Rodarte but will have another derm evaluate the 2 lesions sooner than June. Requested she call back if she changes her mind. Pt thanked me for call.     ----- Message from Tania Blankenship MA sent at 2/27/2024  1:35 PM CST -----  Regarding: FW: Sooner appt request  Soonest I can do is probably late April/May (March completely full). Would you rather this be with [facility] than Acute?   ----- Message -----  From: Sakina Dinh MA  Sent: 2/27/2024   1:34 PM CST  To: Tania Blankenship MA  Subject: FW: Sooner appt request                          Can we put her sooner with dr. Rodarte or would you rather me try acute derm?  ----- Message -----  From: Leatha Barone  Sent: 2/27/2024   1:12 PM CST  To: [facility] Clinical Support Triage  Subject: Sooner appt request                              Good afternoon,    Pt is scheduled for June, she is requesting something more urgent, she stated she has a h/o skin cancer and currently has two spots that are suspicious of cancer, please reach out to pt to advise.    Thank you,  Leatha Barone  [facility]  Ext [phone]

## 2024-02-27 NOTE — TELEPHONE ENCOUNTER
Called to schedule US and new pt appt for varicose veins. Pt stated she and her boyfriend cannot drive anywhere but to main campus. Informed that US can be done there, and I will have someone contact her from main [facility] to get scheduled. Pt verb understanding.

## 2024-02-29 ENCOUNTER — HOSPITAL ENCOUNTER (EMERGENCY)
Facility: HOSPITAL | Age: 64
Discharge: HOME | End: 2024-03-01
Attending: EMERGENCY MEDICINE
Payer: MEDICARE

## 2024-02-29 DIAGNOSIS — M79.604 RIGHT LEG PAIN: ICD-10-CM

## 2024-02-29 DIAGNOSIS — I83.90 VARICOSE VEIN OF LEG: ICD-10-CM

## 2024-02-29 PROCEDURE — 99284 EMERGENCY DEPT VISIT MOD MDM: CPT | Mod: HCNC

## 2024-03-01 VITALS
RESPIRATION RATE: 16 BRPM | HEART RATE: 70 BPM | OXYGEN SATURATION: 95 % | SYSTOLIC BLOOD PRESSURE: 133 MMHG | WEIGHT: 185 LBS | DIASTOLIC BLOOD PRESSURE: 70 MMHG | BODY MASS INDEX: 32.77 KG/M2 | TEMPERATURE: 99 F

## 2024-03-01 NOTE — DISCHARGE INSTRUCTIONS
Stay active. Wear compression stockings as much as possible.   ELEVATE YOUR LEGS AS MUCH AS POSSIBLE.   Quit smoking. This will help.   You can take acetaminophen/tylenol 650 mg every 6 hours or 1000 mg every 8 hours for added relief.  Apply ice to the area for 10-20 minutes every 4 hours. You can apply heat 2 days after for the same duration and frequency.  Continue to follow up with Vascular Surgery and PCP.  Return to the ER for new or worsening symptoms.  Future Appointments   Date Time Provider Department Center   6/5/2024  1:00 PM Varsha Rodarte MD [facility]

## 2024-03-01 NOTE — ED PROVIDER NOTES
Encounter Date: 2/29/2024       History     Chief Complaint   Patient presents with    Leg Pain     Right leg pain for weeks d/t varicose veins       11:37 PM  Patient is a 63-year-old female with a history of HTN, Graves, colitis who presents to [facility] for emergent evaluation of right lower extremity pain.    Patient states that she is disabled but when she was working she was on her feet a lot.  She has had varicose veins for years.  She states 4 weeks ago she noted pain in her right lower leg.  She can not localize it but states it is mainly in her distal thigh and lower leg.  She denies any paresthesias.  She has not seen a vascular surgeon but would like to. Tried to schedule outpatient US; refer to prior notes.  She continues to smoke tobacco.  She denies any pain in her left leg, chest pain, or shortness of breath.  Her current pain is 5/10.  It is worse with prolonged ambulation.        Review of patient's allergies indicates:  No Known Allergies  Past Medical History:   Diagnosis Date    Acute colitis 6/25/2021    Graves disease     Hyperthyroidism     S/P laparoscopic cholecystectomy 6-6-2021 6/24/2021     Past Surgical History:   Procedure Laterality Date    LAPAROSCOPIC CHOLECYSTECTOMY N/A 6/6/2021    Procedure: CHOLECYSTECTOMY, LAPAROSCOPIC;  Surgeon: Sandy Delaney MD;  Location: [facility] OR [address] [place] [address];  Service: General;  Laterality: N/A;     History reviewed. No pertinent family history.  Social History     Tobacco Use    Smoking status: Every Day     Types: Cigarettes    Smokeless tobacco: Never    Tobacco comments:     quit 5/2021   Substance Use Topics    Alcohol use: Never    Drug use: Never     Review of Systems   Constitutional:  Negative for activity change, appetite change and fever.   HENT:  Negative for sore throat.    Respiratory:  Negative for shortness of breath.    Cardiovascular:  Negative for chest pain.   Gastrointestinal:  Negative for nausea.   Genitourinary:  Negative for dysuria.    Musculoskeletal:  Positive for myalgias. Negative for back pain.   Skin:  Negative for rash.   Neurological:  Negative for weakness.   Hematological:  Does not bruise/bleed easily.       Physical Exam     Initial Vitals [02/29/24 2223]   BP Pulse Resp Temp SpO2   133/61 90 17 98.5 °F (36.9 °C) 99 %      MAP       --         Physical Exam    Vitals reviewed.  Constitutional: She appears well-developed and well-nourished. She is not diaphoretic. She is cooperative.  Non-toxic appearance. She does not have a sickly appearance. She does not appear ill. No distress.   HENT:   Head: Normocephalic and atraumatic.   Nose: Nose normal.   Mouth/Throat: No trismus in the jaw.   Eyes: Conjunctivae and EOM are normal.   Neck:   Normal range of motion.  Cardiovascular:  Normal rate and regular rhythm.           Pulses:       Dorsalis pedis pulses are 2+ on the right side.   Bilateral varicose veins. Right >>> left.  No calf tenderness, unilateral leg swelling, or peripheral edema.   No erythema, induration.    Pulmonary/Chest: No accessory muscle usage. No tachypnea. No respiratory distress.   Abdominal: She exhibits no distension.   Musculoskeletal:         General: Normal range of motion.      Cervical back: Normal range of motion.     Neurological: She is alert. She has normal strength.   Skin: Skin is warm and dry. No erythema. No pallor.         ED Course   Procedures  Labs Reviewed - No data to display       Imaging Results              US Lower Extremity Veins Right (Final result)  Result time 03/01/24 01:14:42      Final result by Toi East MD (03/01/24 01:14:42)                   Impression:      No evidence of deep venous thrombosis in the right lower extremity.    Varicose veins in the right lower extremity.      Electronically signed by: Toi East MD  Date:    03/01/2024  Time:    01:14               Narrative:    EXAMINATION:  US LOWER EXTREMITY VEINS RIGHT    CLINICAL HISTORY:  Pain in right  leg.    TECHNIQUE:  Duplex and color flow Doppler evaluation and graded compression of the right lower extremity veins was performed.    COMPARISON:  None.    FINDINGS:  Right thigh veins: The common femoral, femoral, popliteal, upper greater saphenous, and deep femoral veins are patent and free of thrombus. The veins are normally compressible and have normal phasic flow and augmentation response.    Right calf veins: The visualized calf veins are patent.    Contralateral CFV: The contralateral (left) common femoral vein is patent and free of thrombus.    Miscellaneous: Prominent varicose vein in the right lower leg.  No organized fluid collection.  Mild soft tissue edema.                                       Medications - No data to display  Medical Decision Making  Patient is a 63-year-old female with a history of HTN, Graves, colitis who presents to [facility] ED for emergent evaluation of right lower extremity pain.    Differential diagnosis includes but is not limited to varicose veins, thrombophlebitis, DVT.  Intact DP pulse and strong.  Doubt PAD.  She denies any pain in his left leg, chest pain, or shortness a breath.  Doubt other VTE in other extremities, including PE.    Will obtain ultrasound and continue monitor.  She declines oral Tylenol for pain relief.    Amount and/or Complexity of Data Reviewed  External Data Reviewed: notes.               ED Course as of 03/02/24 0744   u Feb 29, 2024 2325 BP: 133/61 [CL]   2325 Temp: 98.5 °F (36.9 °C) [CL]   2325 Pulse: 90 [CL]   2325 Resp: 17 [CL]   2325 SpO2: 99 % [CL]      ED Course User Index  [CL] Yesika Tovar PA-C          1:00 AM  Patient will be signed out to remaining ED team pending US. Refer to notes.   We already discussed treatment for her varicose veins, ie elevation as much as possible, compression stocking, physical activity, tobacco cessation, and follow up with Vasc Surgery.       UPDATE:  US neg for DVT. Patient d/c in stable condition.                   Clinical Impression:  Final diagnoses:  [I83.90] Varicose vein of leg  [M79.604] Right leg pain          ED Disposition Condition    Discharge Stable          ED Prescriptions    None       Follow-up Information       Follow up With Specialties Details Why Contact Info Additional Information    [facility] 5th Fl Vascular Surgery Schedule an appointment as soon as possible for a visit   [address]  [facility] [address] [phone] Main Building, 5th Floor Please park in [facility] and use Clinic elevator    Alexsander Baxter, NP Nephrology Schedule an appointment as soon as possible for a visit   [address]  [address]  [facility] [address]  [phone]       [facility] Emergency Medicine  If symptoms worsen [address]  [facility] [address] [phone]           Future Appointments   Date Time Provider Department Center   6/5/2024  1:00 PM Varsha Rodarte MD [facility] DERM Yesika Leslie PA-C  03/02/24 0744

## 2024-03-01 NOTE — ED TRIAGE NOTES
Mariam Bran, an 63 y.o. female presents to the ED with c/o R leg pain for weeks d/t varicose veins. Pt states it hurts after walking for a while. Denies other symptoms.      Chief Complaint   Patient presents with    Leg Pain     Right leg pain for weeks d/t varicose veins       Review of patient's allergies indicates:  No Known Allergies  Past Medical History:   Diagnosis Date    Acute colitis 6/25/2021    Graves disease     Hyperthyroidism     S/P laparoscopic cholecystectomy 6-6-2021 6/24/2021

## 2024-03-01 NOTE — PROVIDER PROGRESS NOTES - EMERGENCY DEPT.
Imaging Results              US Lower Extremity Veins Right (Final result)  Result time 03/01/24 01:14:42      Final result by Toi East MD (03/01/24 01:14:42)                   Impression:      No evidence of deep venous thrombosis in the right lower extremity.    Varicose veins in the right lower extremity.      Electronically signed by: Toi East MD  Date:    03/01/2024  Time:    01:14               Narrative:    EXAMINATION:  US LOWER EXTREMITY VEINS RIGHT    CLINICAL HISTORY:  Pain in right leg.    TECHNIQUE:  Duplex and color flow Doppler evaluation and graded compression of the right lower extremity veins was performed.    COMPARISON:  None.    FINDINGS:  Right thigh veins: The common femoral, femoral, popliteal, upper greater saphenous, and deep femoral veins are patent and free of thrombus. The veins are normally compressible and have normal phasic flow and augmentation response.    Right calf veins: The visualized calf veins are patent.    Contralateral CFV: The contralateral (left) common femoral vein is patent and free of thrombus.    Miscellaneous: Prominent varicose vein in the right lower leg.  No organized fluid collection.  Mild soft tissue edema.                                       Patient was signed out pending DVT steady.  Ultrasound negative for DVT.  Patient was stable for discharge home with outpatient follow up.

## 2024-03-05 ENCOUNTER — TELEPHONE (OUTPATIENT)
Dept: VASCULAR SURGERY | Facility: CLINIC | Age: 64
End: 2024-03-05
Payer: MEDICARE

## 2024-03-05 DIAGNOSIS — M79.89 PAIN AND SWELLING OF LOWER EXTREMITY: Primary | ICD-10-CM

## 2024-03-05 DIAGNOSIS — I80.03 PHLEBITIS AND THROMBOPHLEBITIS OF SUPERFICIAL VESSELS OF LOWER EXTREMITIES, BILATERAL: ICD-10-CM

## 2024-03-05 DIAGNOSIS — M79.606 PAIN AND SWELLING OF LOWER EXTREMITY: Primary | ICD-10-CM

## 2024-03-05 NOTE — TELEPHONE ENCOUNTER
Informed pt that there are no available vein appts at [facility] per their staff that we have been communicating with and  if she would like to be seen it would have to be here at [facility] for the US and doctor's visit. Pt verb understanding. US scheduled 3/7 @ 11am, clinic visit 3/11 @ 9:25. Pt and boyfriend verb understanding of date/time location of both appointments.

## 2024-03-07 ENCOUNTER — HOSPITAL ENCOUNTER (OUTPATIENT)
Dept: RADIOLOGY | Facility: OTHER | Age: 64
Discharge: HOME | End: 2024-03-07
Attending: SURGERY
Payer: MEDICARE

## 2024-03-07 DIAGNOSIS — I80.03 PHLEBITIS AND THROMBOPHLEBITIS OF SUPERFICIAL VESSELS OF LOWER EXTREMITIES, BILATERAL: ICD-10-CM

## 2024-03-07 DIAGNOSIS — M79.606 PAIN AND SWELLING OF LOWER EXTREMITY: ICD-10-CM

## 2024-03-07 DIAGNOSIS — M79.89 PAIN AND SWELLING OF LOWER EXTREMITY: ICD-10-CM

## 2024-03-07 PROCEDURE — 93970 EXTREMITY STUDY: CPT | Mod: 26,HCNC,, | Performed by: RADIOLOGY

## 2024-03-07 PROCEDURE — 93970 EXTREMITY STUDY: CPT | Mod: TC,HCNC

## 2024-03-11 ENCOUNTER — OFFICE VISIT (OUTPATIENT)
Dept: VASCULAR SURGERY | Facility: CLINIC | Age: 64
End: 2024-03-11
Payer: MEDICARE

## 2024-03-11 VITALS — SYSTOLIC BLOOD PRESSURE: 160 MMHG | HEART RATE: 81 BPM | DIASTOLIC BLOOD PRESSURE: 71 MMHG

## 2024-03-11 DIAGNOSIS — I87.2 VENOUS INSUFFICIENCY: Primary | ICD-10-CM

## 2024-03-11 PROCEDURE — 1159F MED LIST DOCD IN RCRD: CPT | Mod: CPTII,S$GLB,, | Performed by: SURGERY

## 2024-03-11 PROCEDURE — 3078F DIAST BP <80 MM HG: CPT | Mod: CPTII,S$GLB,, | Performed by: SURGERY

## 2024-03-11 PROCEDURE — 99204 OFFICE O/P NEW MOD 45 MIN: CPT | Mod: S$GLB,,, | Performed by: SURGERY

## 2024-03-11 PROCEDURE — 3077F SYST BP >= 140 MM HG: CPT | Mod: CPTII,S$GLB,, | Performed by: SURGERY

## 2024-03-11 RX ORDER — CETIRIZINE HYDROCHLORIDE 10 MG/1
10 TABLET ORAL DAILY PRN
COMMUNITY
Start: 2024-02-23

## 2024-03-11 RX ORDER — EMPAGLIFLOZIN 25 MG/1
25 TABLET, FILM COATED ORAL
COMMUNITY
Start: 2024-02-23

## 2024-03-11 NOTE — PROGRESS NOTES
Bernardo White MD, RPVI                                 [facility]                           [facility]                             03/11/2024    HPI:  Mariam Bran is a 63 y.o. female with   Patient Active Problem List   Diagnosis    History of acute gallstone pancreatitis    Elevated troponin    Essential hypertension    Recurrent Clostridioides difficile diarrhea    Anxiety disorder    Tobacco abuse    S/P laparoscopic cholecystectomy 6-6-2021    Hypomagnesemia    Pulmonary nodule, right    Vitamin D deficiency    C. difficile colitis    Anemia    Graves disease    Leukocytosis    Hypotension    Hypokalemia    Type 2 diabetes mellitus without complication, without long-term current use of insulin    being managed by PCP and specialists who is here today for evaluation of RLE varicose veins.  Patient states location is RLE occurring for yrs.  Associated signs and symptoms include spider veins and edema.  Quality is aching and severity is 6/10.  Symptoms began yrs ago.  Alleviating factors include elevation.  Worsening factors include dependency.  Patient has been wearing compression stockings for greater than 3 months.  Symptoms do limit patient's functional status and daily activities.  no DVT history.  no venous interventions.  no low sodium diet.  no excessive water intake.    Migraine with aura: no  PFO/ASD/right to left shunt: no  Pregnant: no  Breastfeeding: no    no MI  no Stroke  Tobacco use: denies    Past Medical History:   Diagnosis Date    Acute colitis 6/25/2021    Graves disease     Hyperthyroidism     S/P laparoscopic cholecystectomy 6-6-2021 6/24/2021     Past Surgical History:   Procedure Laterality Date    LAPAROSCOPIC CHOLECYSTECTOMY N/A 6/6/2021    Procedure: CHOLECYSTECTOMY, LAPAROSCOPIC;  Surgeon: Sandy Delaney MD;  Location: [facility] OR [address] [place] [address];  Service: General;  Laterality: N/A;     No family history on file.  Social History     Socioeconomic  History    Marital status:    Tobacco Use    Smoking status: Every Day     Types: Cigarettes    Smokeless tobacco: Never    Tobacco comments:     quit 5/2021   Substance and Sexual Activity    Alcohol use: Never    Drug use: Never       Current Outpatient Medications:     ALPRAZolam (XANAX) 1 MG tablet, Take 1 tablet (1 mg total) by mouth 2 (two) times daily as needed (muscle spasms)., Disp: , Rfl:     amitriptyline (ELAVIL) 150 MG Tab, Take 150 mg by mouth every evening. , Disp: , Rfl:     atorvastatin (LIPITOR) 40 MG tablet, Take 40 mg by mouth once daily., Disp: , Rfl:     blood sugar diagnostic Strp, To check BG 3 times daily, to use with insurance preferred meter, Disp: 100 each, Rfl: 0    blood-glucose meter kit, To check BG 3 times daily, to use with insurance preferred meter, Disp: 1 each, Rfl: 0    cetirizine (ZYRTEC) 10 MG tablet, Take 10 mg by mouth daily as needed., Disp: , Rfl:     glimepiride (AMARYL) 2 MG tablet, Take 1 tablet (2 mg total) by mouth before breakfast., Disp: 90 tablet, Rfl: 3    hydroCHLOROthiazide (HYDRODIURIL) 50 MG tablet, Take 50 mg by mouth., Disp: , Rfl:     HYDROcodone-acetaminophen (NORCO)  mg per tablet, Take 1 tablet by mouth 4 (four) times daily as needed for Pain., Disp: , Rfl:     JARDIANCE 25 mg tablet, Take 25 mg by mouth., Disp: , Rfl:     lancets Misc, To check BG 3 times daily, to use with insurance preferred meter, Disp: 100 each, Rfl: 0    lisinopriL 10 MG tablet, TAKE 1 TABLET BY MOUTH ONCE DAILY, Disp: 30 tablet, Rfl: 1    methIMAzole (TAPAZOLE) 5 MG Tab, Take 1 tablet (5 mg total) by mouth once daily. (Patient taking differently: Take 2.5 mg by mouth once daily.), Disp: 90 tablet, Rfl: 3    ondansetron (ZOFRAN-ODT) 4 MG TbDL, Take 1 tablet (4 mg total) by mouth every 6 (six) hours as needed (nausea/vomiting)., Disp: 12 tablet, Rfl: 0    paroxetine (PAXIL) 40 MG tablet, Take 40 mg by mouth once daily. , Disp: , Rfl:     amLODIPine (NORVASC) 10 MG  "tablet, Take 1 tablet (10 mg total) by mouth once daily., Disp: 30 tablet, Rfl: 2    REVIEW OF SYSTEMS:  General: No fevers or chills; ENT: No sore throat; Allergy and Immunology: no persistent infections; Hematological and Lymphatic: No history of bleeding or easy bruising; Endocrine: negative; Respiratory: no cough, shortness of breath, or wheezing; Cardiovascular: no chest pain or dyspnea on exertion; Gastrointestinal: no abdominal pain/back, change in bowel habits, or bloody stools; Genito-Urinary: no dysuria, trouble voiding, or hematuria; Musculoskeletal: edema; Neurological: no TIA or stroke symptoms; Psychiatric: no nervousness, anxiety or depression.    PHYSICAL EXAM:      Pulse: 81         General appearance:  Alert, well-appearing, and in no distress.  Oriented to person, place, and time                    Neurological: Normal speech, no focal findings noted; CN II - XII grossly intact. RLE with sensation to light touch, LLE with sensation to light touch.            Musculoskeletal: Digits/nail without cyanosis/clubbing.  Strength 5/5 BLE.                    Neck: Supple, no significant adenopathy                  Chest:  No wheezes, symmetric air entry. No use of accessory muscles               Cardiac: Normal rate and regular rhythm            Abdomen: Soft, nontender, nondistended      Extremities:   1+ R DP pulse, 1+ L DP pulse      no RLE edema, no LLE edema    Skin:  RLE no ulcer; LLE no ulcer      RLE + spider veins, LLE + spider veins      RLE + varicose veins, LLE no varicose veins    CEAP 2/1    VCSS 4    LAB RESULTS:  No results found for: "CBC"  No results found for: "LABPROT", "INR"  Lab Results   Component Value Date     10/11/2023    K 4.2 10/11/2023     10/11/2023    CO2 25 10/11/2023    GLU 80 10/11/2023    BUN 17 10/11/2023    CREATININE 0.7 10/11/2023    CALCIUM 9.3 10/11/2023    ANIONGAP 12 10/11/2023    EGFRNONAA >60.0 09/25/2021     Lab Results   Component Value Date    " WBC 11.48 09/25/2021    RBC 3.60 (L) 09/25/2021    HGB 10.0 (L) 09/25/2021    HCT 30.5 (L) 09/25/2021    MCV 85 09/25/2021    MCH 27.8 09/25/2021    MCHC 32.8 09/25/2021    RDW 14.6 (H) 09/25/2021     09/25/2021    MPV 9.3 09/25/2021    GRAN 8.4 (H) 09/25/2021    GRAN 72.8 09/25/2021    LYMPH 1.9 09/25/2021    LYMPH 16.4 (L) 09/25/2021    MONO 0.7 09/25/2021    MONO 6.4 09/25/2021    EOS 0.3 09/25/2021    BASO 0.04 09/25/2021    EOSINOPHIL 2.9 09/25/2021    BASOPHIL 0.3 09/25/2021    DIFFMETHOD Automated 09/25/2021     .  Lab Results   Component Value Date    HGBA1C 5.9 (H) 10/11/2023       IMAGING:  All pertinent imaging has been reviewed and interpreted independently.    FINDINGS:  The common femoral, proximal greater saphenous, femoral, popliteal, peroneal, posterior tibial, and anterior tibial veins were evaluated using a combination of graded compression and augmentation techniques. These veins are noted to be patent. There is no sonographic evidence for deep venous thrombosis within either lower extremity.     The greater saphenous and lesser saphenous veins were evaluated bilaterally for reflux using both Valsalva and standing maneuvers.     The right greater saphenous vein measures up to 7.3 mm in diameter at the saphenofemoral junction.  There is no significant venous reflux identified within the right greater saphenous vein. The right lesser saphenous vein measures 1.4 mm in diameter at the mid calf.  No significant reflux is identified within the right lesser saphenous vein.  The right anterior accessory saphenous vein measures 1.4 mm in diameter within the proximal thigh.  The anterior accessory saphenous vein within the distal thigh demonstrates significant venous reflux lasting 4514 milliseconds.     The left greater saphenous vein measures up to 7.7 mm in diameter at the saphenofemoral junction.  There is no evidence for significant reflux within the left greater saphenous vein. The left lesser  saphenous vein measures 1.8 mm in diameter at the mid calf.  No evidence for significant venous reflux within the left lesser saphenous vein.  The left anterior accessory saphenous vein measures 1.4 mm in diameter within the proximal thigh.  No significant venous reflux is identified within the left anterior accessory saphenous vein.     Impression:     No evidence for deep venous thrombosis within either lower extremity.     Varicosities associated with the distal right anterior accessory saphenous vein with significant venous reflux identified within the right anterior accessory saphenous vein within the distal thigh.     No evidence for significant venous reflux within the greater and lesser saphenous veins bilaterally and within the left anterior accessory vein.     Dilated greater saphenous veins at the saphenofemoral junctions bilaterally.        Electronically signed by: Danny Fuller MD  Date:                                            03/07/2024    IMP/PLAN:  63 y.o. female with   Patient Active Problem List   Diagnosis    History of acute gallstone pancreatitis    Elevated troponin    Essential hypertension    Recurrent Clostridioides difficile diarrhea    Anxiety disorder    Tobacco abuse    S/P laparoscopic cholecystectomy 6-6-2021    Hypomagnesemia    Pulmonary nodule, right    Vitamin D deficiency    C. difficile colitis    Anemia    Graves disease    Leukocytosis    Hypotension    Hypokalemia    Type 2 diabetes mellitus without complication, without long-term current use of insulin    being managed by PCP and specialists who is here today for evaluation of RLE varicose veins.    -recommend compression with Rx stockings, elevation, dietary changes associated with water and sodium intake discussed at length with patient  Recommend warm compresses, NSAID and elevation for thrombophlebitis prn  -Exercise   -RTC 3 months for further evaluation    I spent 11 minutes evaluating this patient and greater than  50% of the time was spent counseling, coordinator care and discussing the plan of care.  All questions were answered and patient stated understanding with agreement with the above treatment plan.    Bernardo White MD [facility]  Vascular and Endovascular Surgery

## 2024-06-05 ENCOUNTER — OFFICE VISIT (OUTPATIENT)
Dept: DERMATOLOGY | Facility: CLINIC | Age: 64
End: 2024-06-05
Payer: MEDICARE

## 2024-06-05 DIAGNOSIS — Z85.820 HISTORY OF MELANOMA: ICD-10-CM

## 2024-06-05 DIAGNOSIS — D22.9 MULTIPLE BENIGN NEVI: Primary | ICD-10-CM

## 2024-06-05 DIAGNOSIS — L81.4 LENTIGINES: ICD-10-CM

## 2024-06-05 PROCEDURE — 1160F RVW MEDS BY RX/DR IN RCRD: CPT | Mod: HCNC,CPTII,S$GLB, | Performed by: STUDENT IN AN ORGANIZED HEALTH CARE EDUCATION/TRAINING PROGRAM

## 2024-06-05 PROCEDURE — 1159F MED LIST DOCD IN RCRD: CPT | Mod: HCNC,CPTII,S$GLB, | Performed by: STUDENT IN AN ORGANIZED HEALTH CARE EDUCATION/TRAINING PROGRAM

## 2024-06-05 PROCEDURE — 99999 PR PBB SHADOW E&M-EST. PATIENT-LVL II: CPT | Mod: PBBFAC,HCNC,, | Performed by: STUDENT IN AN ORGANIZED HEALTH CARE EDUCATION/TRAINING PROGRAM

## 2024-06-05 PROCEDURE — 99203 OFFICE O/P NEW LOW 30 MIN: CPT | Mod: HCNC,S$GLB,, | Performed by: STUDENT IN AN ORGANIZED HEALTH CARE EDUCATION/TRAINING PROGRAM

## 2024-06-05 NOTE — PROGRESS NOTES
Subjective:      Patient ID:  Mariam Bran is a 64 y.o. female who presents for   Chief Complaint   Patient presents with    Skin Check     TBSE     Mariam Bran is a 64 y.o. female who presents for: FBSE screening exam for skin cancer.    New patient to [facility]    The patient has the following lesions of concern:  Location: spots on arms   Duration: years  Symptoms: none  Relieving factors/Previous treatments: none]    No fevers, chills, or swelling of LN  + weight loss intended but started on diabetic medication for this a few months ago    Pertinent history:  History of blistering sunburns: Yes  History of tanning bed use: No  Family history of melanoma: No  Personal history of mole removal: Yes  Personal history of skin cancer: Yes - pt states she had a cancer excised from her R arm about 1.5 yr ago by [facility] - she thinks melanoma but patient does not have records or remember depth         Review of Systems   Skin:  Positive for activity-related sunscreen use. Negative for daily sunscreen use and recent sunburn.   Hematologic/Lymphatic: Does not bruise/bleed easily.       Objective:   Physical Exam   Constitutional: She appears well-developed and well-nourished. No distress.   Neurological: She is alert and oriented to person, place, and time. She is not disoriented.   Psychiatric: She has a normal mood and affect.   Skin:   Areas Examined (abnormalities noted in diagram):   Scalp / Hair Palpated and Inspected  Head / Face Inspection Performed  Neck Inspection Performed  Chest / Axilla Inspection Performed  Abdomen Inspection Performed  Back Inspection Performed  RUE Inspected  LUE Inspection Performed  RLE Inspected  LLE Inspection Performed  Nails and Digits Inspection Performed                Diagram Legend     Erythematous scaling macule/papule c/w actinic keratosis       Vascular papule c/w angioma      Pigmented verrucoid papule/plaque c/w seborrheic keratosis      Yellow  umbilicated papule c/w sebaceous hyperplasia      Irregularly shaped tan macule c/w lentigo     1-2 mm smooth white papules consistent with Milia      Movable subcutaneous cyst with punctum c/w epidermal inclusion cyst      Subcutaneous movable cyst c/w pilar cyst      Firm pink to brown papule c/w dermatofibroma      Pedunculated fleshy papule(s) c/w skin tag(s)      Evenly pigmented macule c/w junctional nevus     Mildly variegated pigmented, slightly irregular-bordered macule c/w mildly atypical nevus      Flesh colored to evenly pigmented papule c/w intradermal nevus       Pink pearly papule/plaque c/w basal cell carcinoma      Erythematous hyperkeratotic cursted plaque c/w SCC      Surgical scar with no sign of skin cancer recurrence      Open and closed comedones      Inflammatory papules and pustules      Verrucoid papule consistent consistent with wart     Erythematous eczematous patches and plaques     Dystrophic onycholytic nail with subungual debris c/w onychomycosis     Umbilicated papule    Erythematous-base heme-crusted tan verrucoid plaque consistent with inflamed seborrheic keratosis     Erythematous Silvery Scaling Plaque c/w Psoriasis     See annotation      Assessment / Plan:        Multiple benign nevi  Reassurance    Lentigines  This is a benign hyperpigmented sun induced lesion. Recommend daily sun protection/avoidance and use of at least SPF 30, broad spectrum sunscreen (OTC drug) will reduce the number of new lesions.     Personal history of melanoma  No fevers, chills, or unintended weight loss; lymph nodes examined  Well-healed scar located R upper arm no evidence of recurrence  - Continue skin exams q6 months   - Discussed ABCDE's of nevi.  Monitor for new mole or moles that are becoming bigger, darker, irritated, or developing irregular borders. Instructed patient to observe lesion(s) for changes and follow up in clinic if changes are noted. Patient to monitor skin at home for new or  changing lesions.     Of note, pt reports hx of melanoma excised from R upper arm at [facility] dermatology 1.5 years ago. I discussed with melanoma it is very important she bring or fax in records so I can know the depth and help determine follow up and monitoring recommended. I have had her sign a records release form today. Of note, my medical assistant Tania Blankensihp has attempted multiple times to reach [facility] for records for some of my other patients and we have been unsuccessful getting records faxed for them. She will try again for this patient and has faxed over the record release form.    RTC 6 months, sooner prn

## 2024-06-06 ENCOUNTER — TELEPHONE (OUTPATIENT)
Dept: GASTROENTEROLOGY | Facility: CLINIC | Age: 64
End: 2024-06-06
Payer: MEDICARE

## 2024-06-06 NOTE — TELEPHONE ENCOUNTER
Pt notified referred received from EDINSON Roca.  Clinic appt scheduled on Wednesday, June 12, 2024 at 10am.  Clinic address given and repeated correctly.

## 2024-06-11 ENCOUNTER — TELEPHONE (OUTPATIENT)
Dept: GASTROENTEROLOGY | Facility: CLINIC | Age: 64
End: 2024-06-11
Payer: MEDICARE

## 2024-06-12 ENCOUNTER — OFFICE VISIT (OUTPATIENT)
Dept: GASTROENTEROLOGY | Facility: CLINIC | Age: 64
End: 2024-06-12
Payer: MEDICARE

## 2024-06-12 VITALS
BODY MASS INDEX: 31.07 KG/M2 | SYSTOLIC BLOOD PRESSURE: 121 MMHG | HEART RATE: 70 BPM | HEIGHT: 63 IN | WEIGHT: 175.38 LBS | DIASTOLIC BLOOD PRESSURE: 63 MMHG

## 2024-06-12 DIAGNOSIS — R19.5 POSITIVE COLORECTAL CANCER SCREENING USING COLOGUARD TEST: Primary | ICD-10-CM

## 2024-06-12 DIAGNOSIS — R93.3 GASTROINTESTINAL TRACT IMAGING ABNORMALITY: ICD-10-CM

## 2024-06-12 PROCEDURE — 99999 PR PBB SHADOW E&M-EST. PATIENT-LVL IV: CPT | Mod: PBBFAC,HCNC,, | Performed by: INTERNAL MEDICINE

## 2024-06-12 PROCEDURE — 3008F BODY MASS INDEX DOCD: CPT | Mod: HCNC,CPTII,S$GLB, | Performed by: INTERNAL MEDICINE

## 2024-06-12 PROCEDURE — 3078F DIAST BP <80 MM HG: CPT | Mod: HCNC,CPTII,S$GLB, | Performed by: INTERNAL MEDICINE

## 2024-06-12 PROCEDURE — 1159F MED LIST DOCD IN RCRD: CPT | Mod: HCNC,CPTII,S$GLB, | Performed by: INTERNAL MEDICINE

## 2024-06-12 PROCEDURE — 3074F SYST BP LT 130 MM HG: CPT | Mod: HCNC,CPTII,S$GLB, | Performed by: INTERNAL MEDICINE

## 2024-06-12 PROCEDURE — 99204 OFFICE O/P NEW MOD 45 MIN: CPT | Mod: HCNC,S$GLB,, | Performed by: INTERNAL MEDICINE

## 2024-06-12 RX ORDER — SODIUM, POTASSIUM,MAG SULFATES 17.5-3.13G
1 SOLUTION, RECONSTITUTED, ORAL ORAL DAILY
Qty: 1 KIT | Refills: 0 | Status: SHIPPED | OUTPATIENT
Start: 2024-06-12 | End: 2024-06-14

## 2024-06-12 NOTE — PROGRESS NOTES
"[facility]      HPI 64 y.o. female here for a positive cologuard. Today, reports she is feeling well. Has chronic constipation she says is well controlled. Denies GERD, n/v, diarrhea, dysphagia, unintentional weight loss, overt GI bleeding, or NSAID use.    Endo Hx:  Colonoscopy 10 yrs ago - reports one small polyp    Physical Examination  /63 (BP Location: Right arm, Patient Position: Sitting, BP Method: Large (Automatic))   Pulse 70   Ht 5' 3" (1.6 m)   Wt 79.5 kg (175 lb 6 oz)   BMI 31.07 kg/m²   General appearance: alert, cooperative, no distress  Abdomen: soft, non-tender; bowel sounds normoactive; no organomegaly      Assessment:   64F with Graves disease, DM2, CCY, tobacco use, normocytic anemia here for a +cologuard. Possible colon polyps in past per patient report. Also, has chronic constipation that is well controlled. In 2021, had "pancolitis" on CT imaging    Has a new illness with uncertain prognosis    Reviewed CT from 2021 images with mild to moderate but diffuse colon wall thickening per my view and interpretation.    Plan:  -colonoscopy on 6/20 to evaluate colon wall thickening in CT imaging and +cologuard testing  -suprep  -reinterview next visit         Will MD Mendez   [facility]   Kolby Yao MD   [facility]   [address], [address]  [place] [address] [phone]  "

## 2024-06-12 NOTE — PATIENT INSTRUCTIONS
SUPREP Instructions    [facility] [address]  Clinic Office [phone]  Endoscopy Lab [phone]    You are scheduled for a Colonoscopy with Dr. Yao   on Thursday, June 20, 2024 at [facility] in [place].  Someone from the hospital will call you 7 days prior to the procedure with an arrival time.  Check in at the Hospital -1st floor, Information desk.   Call [phone] to reschedule.    An adult friend/family member must come with you to drive you home.  You cannot drive, take a taxi, Uber/Lyft or bus to leave the Endoscopy Center alone.  If you do not have someone to drive you home, your test will be cancelled.     Please follow the directions of your doctor if you take any pills that thin your blood. If you take these meds: Aggrenox, Brilinta, Effient, Eliquis, Lovenox, Plavix, Pletal, Pradaxa, Ticilid, Xarelto or Coumadin, let the doctor's office know.    Please hold any GLP-1 medications prior to the procedure: Dulaglutide Trulicity(hold week prior), Exenatide Byetta (hold the morning of procedure), Semaglutide Ozempic (hold week prior), Liraglutide Victoza, Saxenda(hold week prior), Lixisenatide Adlyxin (hold the morning of procedure), Semaglutide Rybelsus (hold the morning of procedure), Tirzepatide Mounjaro (hold week prior)     DON'T: On the morning of the test do not take insulin or pills for diabetes.     DO: On the morning of the test, do take any pills for blood pressure, heart, anti-rejection and or seizures with a small sip of water. Bring any inhalers with you.    To have a good prep, you must follow these instructions - please do not use the directions from the pharmacy.    The doctor will send a prescription for the SUPREP.      The Day Before the test:    How to take prep:    SUPREP Bowel Prep Kit is a (2-day) prep.   Both 6-ounce bottles are required for a complete preparation for colonoscopy. Dilute the solution concentrate as directed  prior to use. You must drink water with each dose of SUPREP, and additional water after each dose.    DOSE 1--Day Before Colonoscopy    Drink at least 6 to 8 glasses of clear liquids from time you wake up until you begin your prep and then continue until bedtime to avoid dehydration.     You CAN have:  Water, Coffee or decaf coffee (no milk or cream)  Tea  Soft drinks - regular and sugar free  Jello (green or yellow)  Apple Juice, white grape juice, white cranberry juice  Gatorade, Power Aid, Crystal Light, Dk Aid  Lemonade and Limeade  Bouillon, clear soup  Snowball, popsicles  YOU CAN'T DRINK ANYTHING RED, PURPLE ORANGE OR BLUE   YOU CAN'T DRINK ALCOHOL  ONLY DRINK WHAT IS ON THE LIST      5:00 pm:    You must complete Steps 1 through 4 using one (1) 6-ounce bottle before going to bed as shown below:    Step 1-Pour ONE (1) 6-ounce bottle of SUPREP liquid into the mixing container.  Step 2-Add cool drinking water to the 16-ounce line on the container and mix.  Step 3-Drink ALL the liquid in the container.  Step 4-You must drink two (2) more 16-ounce containers of water over the next 1 hour.  IMPORTANT: If you experience preparation-related symptoms (for example, nausea, bloating, or cramping), stop, or slow the rate of drinking the additional water until your symptoms decrease.    DOSE 2--Day of the Colonoscopy _________________ at 2-3 AM.    For this dose, repeat Steps 1 through 4 shown above using the other 6-ounce bottle.   You may continue drinking water/clear liquids until   4 hours before your colonoscopy or as directed by the scheduling nurse      For information about your procedure, two (2) things to view prior to colonoscopy:  Please watch this informational video. It is important to watch this animated consent video prior to your arrival. If you haven't watched the video prior to arriving, you are required to watch it during admission which can causes delays.    Options for viewing:   Using a  keyboard:  press and hold the control tab (Ctrl) and left mouse click to follow links.           Colonoscopy Instructional Video                                                                                   OR    Type link address into your web browser's address bar:  [email or web address]      Educational Booklet with pictures:      Colonoscopy Prep - Liquid      Leave all valuables and jewelry at home. You will be at the hospital for 2-4 hours.    Call the Endoscopy department at [phone] with any questions about your procedure.

## 2024-06-18 ENCOUNTER — TELEPHONE (OUTPATIENT)
Dept: ENDOSCOPY | Facility: HOSPITAL | Age: 64
End: 2024-06-18
Payer: MEDICARE

## 2024-06-18 NOTE — TELEPHONE ENCOUNTER
Spoke with patient about arrival time @. 1100  Covid test =     Prep instructions reviewed: the day before the procedure, follow a clear liquid diet all day, then start the first 1/2 of prep at 5pm and take 2nd 1/2 of prep @.600  Pt must be completely NPO when prep completed @.      800        Medications: Do not take Insulin or oral diabetic medications the day of the procedure.  Take as prescribed: heart, seizure and blood pressure medication in the morning with a sip of water (less than an ounce).  Take any breathing medications and bring inhalers to hospital with you Leave all valuables and jewelry at home.     Wear comfortable clothes to procedure to change into hospital gown You cannot drive for 24 hours after your procedure because you will receive sedation for your procedure to make you comfortable.  A ride must be provided at discharge.

## 2024-06-20 ENCOUNTER — ANESTHESIA (OUTPATIENT)
Dept: ENDOSCOPY | Facility: HOSPITAL | Age: 64
End: 2024-06-20
Payer: MEDICARE

## 2024-06-20 ENCOUNTER — HOSPITAL ENCOUNTER (OUTPATIENT)
Facility: HOSPITAL | Age: 64
Discharge: HOME | End: 2024-06-20
Attending: INTERNAL MEDICINE | Admitting: INTERNAL MEDICINE
Payer: MEDICARE

## 2024-06-20 ENCOUNTER — ANESTHESIA EVENT (OUTPATIENT)
Dept: ENDOSCOPY | Facility: HOSPITAL | Age: 64
End: 2024-06-20
Payer: MEDICARE

## 2024-06-20 VITALS
DIASTOLIC BLOOD PRESSURE: 73 MMHG | SYSTOLIC BLOOD PRESSURE: 146 MMHG | OXYGEN SATURATION: 99 % | HEART RATE: 66 BPM | TEMPERATURE: 97 F | RESPIRATION RATE: 14 BRPM

## 2024-06-20 DIAGNOSIS — K92.1 BLOOD IN STOOL: ICD-10-CM

## 2024-06-20 DIAGNOSIS — R93.3 ABNORMAL FINDING ON GI TRACT IMAGING: ICD-10-CM

## 2024-06-20 DIAGNOSIS — K63.5 POLYP OF COLON, UNSPECIFIED PART OF COLON, UNSPECIFIED TYPE: ICD-10-CM

## 2024-06-20 DIAGNOSIS — R93.3 IMAGING OF GASTROINTESTINAL TRACT ABNORMAL: Primary | ICD-10-CM

## 2024-06-20 LAB — POCT GLUCOSE: 98 MG/DL (ref 70–110)

## 2024-06-20 PROCEDURE — 37000009 HC ANESTHESIA EA ADD 15 MINS: Mod: HCNC | Performed by: INTERNAL MEDICINE

## 2024-06-20 PROCEDURE — 37000008 HC ANESTHESIA 1ST 15 MINUTES: Mod: HCNC | Performed by: INTERNAL MEDICINE

## 2024-06-20 PROCEDURE — 27201089 HC SNARE, DISP (ANY): Mod: HCNC | Performed by: INTERNAL MEDICINE

## 2024-06-20 PROCEDURE — 88305 TISSUE EXAM BY PATHOLOGIST: CPT | Mod: HCNC | Performed by: PATHOLOGY

## 2024-06-20 PROCEDURE — 45385 COLONOSCOPY W/LESION REMOVAL: CPT | Mod: HCNC | Performed by: INTERNAL MEDICINE

## 2024-06-20 PROCEDURE — 63600175 PHARM REV CODE 636 W HCPCS: Mod: HCNC | Performed by: NURSE ANESTHETIST, CERTIFIED REGISTERED

## 2024-06-20 PROCEDURE — 25000003 PHARM REV CODE 250: Mod: HCNC | Performed by: INTERNAL MEDICINE

## 2024-06-20 PROCEDURE — 25000003 PHARM REV CODE 250: Mod: HCNC | Performed by: NURSE ANESTHETIST, CERTIFIED REGISTERED

## 2024-06-20 RX ORDER — PROPOFOL 10 MG/ML
VIAL (ML) INTRAVENOUS CONTINUOUS PRN
Status: DISCONTINUED | OUTPATIENT
Start: 2024-06-20 | End: 2024-06-20

## 2024-06-20 RX ORDER — PROPOFOL 10 MG/ML
VIAL (ML) INTRAVENOUS
Status: DISCONTINUED | OUTPATIENT
Start: 2024-06-20 | End: 2024-06-20

## 2024-06-20 RX ORDER — LIDOCAINE HYDROCHLORIDE 20 MG/ML
INJECTION INTRAVENOUS
Status: DISCONTINUED | OUTPATIENT
Start: 2024-06-20 | End: 2024-06-20

## 2024-06-20 RX ORDER — SODIUM CHLORIDE 9 MG/ML
INJECTION, SOLUTION INTRAVENOUS CONTINUOUS
Status: DISCONTINUED | OUTPATIENT
Start: 2024-06-20 | End: 2024-06-20 | Stop reason: HOSPADM

## 2024-06-20 RX ORDER — DEXTROMETHORPHAN/PSEUDOEPHED 2.5-7.5/.8
DROPS ORAL
Status: COMPLETED | OUTPATIENT
Start: 2024-06-20 | End: 2024-06-20

## 2024-06-20 RX ADMIN — PROPOFOL 150 MCG/KG/MIN: 10 INJECTION, EMULSION INTRAVENOUS at 01:06

## 2024-06-20 RX ADMIN — PROPOFOL 70 MG: 10 INJECTION, EMULSION INTRAVENOUS at 01:06

## 2024-06-20 RX ADMIN — SODIUM CHLORIDE: 0.9 INJECTION, SOLUTION INTRAVENOUS at 12:06

## 2024-06-20 RX ADMIN — LIDOCAINE HYDROCHLORIDE 75 MG: 20 INJECTION, SOLUTION INTRAVENOUS at 01:06

## 2024-06-20 RX ADMIN — SODIUM CHLORIDE: 0.9 INJECTION, SOLUTION INTRAVENOUS at 01:06

## 2024-06-20 NOTE — TRANSFER OF CARE
Anesthesia Transfer of Care Note    Patient: Mariam Bran    Procedure(s) Performed: Procedure(s) (LRB):  COLONOSCOPY (N/A)    Patient location: GI    Anesthesia Type: general    Transport from OR: Transported from OR on room air with adequate spontaneous ventilation    Post pain: adequate analgesia    Post assessment: no apparent anesthetic complications and tolerated procedure well    Post vital signs: stable    Level of consciousness: awake    Nausea/Vomiting: no nausea/vomiting    Complications: none    Transfer of care protocol was followed      Last vitals: Visit Vitals  BP (!) 173/70   Pulse 77   Temp 36.1 °C (97 °F)   Resp 18   SpO2 97%   Breastfeeding No

## 2024-06-20 NOTE — PROVATION PATIENT INSTRUCTIONS
Discharge Summary/Instructions after an Endoscopic Procedure  Patient Name: Mariam Bran  Patient MRN: [id number]  Patient YOB: 1960 Thursday, June 20, 2024  Kolby Yao MD  Dear patient,  As a result of recent federal legislation (The Federal Cures Act), you may   receive lab or pathology results from your procedure in your [facility]   account before your physician is able to contact you. Your physician or   their representative will relay the results to you with their   recommendations at their soonest availability.  Thank you,  Your health is very important to us during the Covid Crisis. Following your   procedure today, you will receive a daily text for 2 weeks asking about   signs or symptoms of Covid 19.  Please respond to this text when you   receive it so we can follow up and keep you as safe as possible.   RESTRICTIONS:  During your procedure today, you received medications for sedation.  These   medications may affect your judgment, balance and coordination.  Therefore,   for 24 hours, you have the following restrictions:   - DO NOT drive a car, operate machinery, make legal/financial decisions,   sign important papers or drink alcohol.    ACTIVITY:  Today: no heavy lifting, straining or running due to procedural   sedation/anesthesia.  The following day: return to full activity including work.  DIET:  Eat and drink normally unless instructed otherwise.     TREATMENT FOR COMMON SIDE EFFECTS:  - Mild abdominal pain, nausea, belching, bloating or excessive gas:  rest,   eat lightly and use a heating pad.  - Sore Throat: treat with throat lozenges and/or gargle with warm salt   water.  - Because air was used during the procedure, expelling large amounts of air   from your rectum or belching is normal.  - If a bowel prep was taken, you may not have a bowel movement for 1-3 days.    This is normal.  SYMPTOMS TO WATCH FOR AND REPORT TO YOUR PHYSICIAN:  1. Abdominal pain or bloating, other than gas  cramps.  2. Chest pain.  3. Back pain.  4. Signs of infection such as: chills or fever occurring within 24 hours   after the procedure.  5. Rectal bleeding, which would show as bright red, maroon, or black stools.   (A tablespoon of blood from the rectum is not serious, especially if   hemorrhoids are present.)  6. Vomiting.  7. Weakness or dizziness.  GO DIRECTLY TO THE NEAREST EMERGENCY ROOM IF YOU HAVE ANY OF THE FOLLOWING:      Difficulty breathing              Chills and/or fever over 101 F   Persistent vomiting and/or vomiting blood   Severe abdominal pain   Severe chest pain   Black, tarry stools   Bleeding- more than one tablespoon   Any other symptom or condition that you feel may need urgent attention  Your doctor recommends these additional instructions:  If any biopsies were taken, your doctors clinic will contact you in 1 to 2   weeks with any results.  - Discharge patient to home.   - Discharge to home  - Resume previous diet and medications  - Condition stable   - The signs and symptoms of potential delayed complications were discussed   with the patient. If signs or symptoms of these complications develop, call   the [facility] at [phone].   - Return to normal activities tomorrow.  Written discharge instructions were   provided to the patient.   - Repeat colonoscopy in 7 years for surveillance.  For questions, problems or results please call your physician - Kolby Yao MD.  EMERGENCY PHONE NUMBER: [phone],  LAB RESULTS: [phone]  IF A COMPLICATION OR EMERGENCY SITUATION ARISES AND YOU ARE UNABLE TO REACH   YOUR PHYSICIAN - GO DIRECTLY TO THE EMERGENCY ROOM.  MD Kolby Jean-Baptiste MD  6/20/2024 4:46:32 PM  This report has been verified and signed electronically.  Dear patient,  As a result of recent federal legislation (The Federal Cures Act), you may   receive lab or pathology results from your procedure in your [facility]   account before your  physician is able to contact you. Your physician or   their representative will relay the results to you with their   recommendations at their soonest availability.  Thank you,  PROVATION

## 2024-06-20 NOTE — H&P
"[facility]      HPI 64 y.o. female here for a positive cologuard. Today, reports she is feeling well. Has chronic constipation she says is well controlled. Denies GERD, n/v, diarrhea, dysphagia, unintentional weight loss, overt GI bleeding, or NSAID use.    Endo Hx:  Colonoscopy 10 yrs ago - reports one small polyp    Physical Examination  There were no vitals taken for this visit.  General appearance: alert, cooperative, no distress  Abdomen: soft, non-tender; bowel sounds normoactive; no organomegaly      Assessment:   64F with Graves disease, DM2, CCY, tobacco use, normocytic anemia here for a +cologuard. Possible colon polyps in past per patient report. Also, has chronic constipation that is well controlled. In 2021, had "pancolitis" on CT imaging    Has a new illness with uncertain prognosis    Reviewed CT from 2021 images with mild to moderate but diffuse colon wall thickening per my view and interpretation.    Plan:  -Proceed with colonoscopy to evaluate colon wall thickening in CT imaging and +cologuard testing  -suprep      Kolby Yao MD   [facility]   [address], [address]  [place] [address] [phone]  "

## 2024-06-20 NOTE — ANESTHESIA PREPROCEDURE EVALUATION
06/20/2024    Mariam Bran is a 64 y.o., female.    Past Medical History:   Diagnosis Date    Acute colitis 6/25/2021    Graves disease     Hyperthyroidism     S/P laparoscopic cholecystectomy 6-6-2021 6/24/2021     Past Surgical History:   Procedure Laterality Date    LAPAROSCOPIC CHOLECYSTECTOMY N/A 6/6/2021    Procedure: CHOLECYSTECTOMY, LAPAROSCOPIC;  Surgeon: Sandy Delaney MD;  Location: [facility] OR [address] [place] [address];  Service: General;  Laterality: N/A;           Pre-op Assessment    I have reviewed the Patient Summary Reports.     I have reviewed the Nursing Notes. I have reviewed the NPO Status.      Review of Systems  Anesthesia Hx:   History of prior surgery of interest to airway management or planning:            Denies Personal Hx of Anesthesia complications.                    Cardiovascular:     Hypertension                                        Hepatic/GI:  Hepatic/GI Normal                 Endocrine:  Diabetes  Hyperthyroidism             Physical Exam  General: Well nourished    Airway:  Mallampati: II   Mouth Opening: Normal  Neck ROM: Normal ROM    Dental:  Intact        Anesthesia Plan  Type of Anesthesia, risks & benefits discussed:    Anesthesia Type: Gen Natural Airway  Informed Consent: Informed consent signed with the Patient and all parties understand the risks and agree with anesthesia plan.  All questions answered.   ASA Score: 2    Ready For Surgery From Anesthesia Perspective.     .

## 2024-06-20 NOTE — ANESTHESIA POSTPROCEDURE EVALUATION
Anesthesia Post Evaluation    Patient: Mariam Bran    Procedure(s) Performed: Procedure(s) (LRB):  COLONOSCOPY (N/A)    Final Anesthesia Type: general      Patient location during evaluation: PACU  Patient participation: Yes- Able to Participate  Level of consciousness: awake and alert  Post-procedure vital signs: reviewed and stable  Pain management: adequate  Airway patency: patent    PONV status at discharge: No PONV  Anesthetic complications: no      Cardiovascular status: blood pressure returned to baseline  Respiratory status: unassisted  Hydration status: euvolemic                Vitals Value Taken Time   /57 06/20/24 1440   Temp 36 °C (96.8 °F) 06/20/24 1440   Pulse 61 06/20/24 1440   Resp 14 06/20/24 1440   SpO2 99 % 06/20/24 1440         No case tracking events are documented in the log.      Pain/Matt Score: Matt Score: 8 (6/20/2024  2:40 PM)

## 2024-06-26 LAB
FINAL PATHOLOGIC DIAGNOSIS: NORMAL
GROSS: NORMAL
Lab: NORMAL
MICROSCOPIC EXAM: NORMAL

## 2024-07-11 ENCOUNTER — TELEPHONE (OUTPATIENT)
Dept: GASTROENTEROLOGY | Facility: HOSPITAL | Age: 64
End: 2024-07-11
Payer: MEDICARE

## 2024-07-11 NOTE — TELEPHONE ENCOUNTER
Spoke with  Shant regarding tubular adenoma resected during colonoscopy. Will continue with plan for surveillance colonoscopy in 7 years.

## 2024-08-14 ENCOUNTER — TELEPHONE (OUTPATIENT)
Dept: ENDOCRINOLOGY | Facility: CLINIC | Age: 64
End: 2024-08-14
Payer: MEDICARE

## 2024-08-14 NOTE — TELEPHONE ENCOUNTER
----- Message from Yuliya Egan sent at 8/14/2024  2:29 PM CDT -----  Regarding: Annual Appt  Contact: [phone]  Mariam Bran calling regarding Appointment Access  (message) for # pt is calling to schedule her annual visit and also lab work pls advise

## 2024-08-15 ENCOUNTER — TELEPHONE (OUTPATIENT)
Dept: ENDOCRINOLOGY | Facility: CLINIC | Age: 64
End: 2024-08-15
Payer: MEDICARE

## 2024-08-15 NOTE — TELEPHONE ENCOUNTER
----- Message from Tracy Vick sent at 8/14/2024  4:18 PM CDT -----  Type:  Patient Returning Call    Who Called: Pt   Does the patient know what this is regarding?: returning missed call   Would the patient rather a call back or a response via [facility]? Call   Best Call Back Number:[phone]   Additional Information:

## 2024-08-20 ENCOUNTER — TELEPHONE (OUTPATIENT)
Dept: ENDOCRINOLOGY | Facility: CLINIC | Age: 64
End: 2024-08-20
Payer: MEDICARE

## 2024-08-20 NOTE — TELEPHONE ENCOUNTER
----- Message from Nubia Pleitez sent at 8/20/2024 12:52 PM CDT -----  Type:  Sooner Apoointment Request    Caller is requesting a sooner appointment.  Caller declined first available appointment listed below.  Caller will not accept being placed on the waitlist and is requesting a message be sent to doctor.  Name of Caller: Pt  When is the first available appointment?  Symptoms: check up and medication review  Would the patient rather a call back or a response via [facility]? Call  Best Call Back Number: [phone]  Additional Information:  Pt would like to speak to someone in office for an appt.

## 2024-08-20 NOTE — TELEPHONE ENCOUNTER
Call attempted. No answer. Pt is not due back for a visit until January. January schedule is not open yet. It will open on September 1. Pt will need to check back on September 1 to get scheduled for sometime in January.

## 2024-08-21 DIAGNOSIS — E05.00 GRAVES DISEASE: ICD-10-CM

## 2024-08-23 RX ORDER — METHIMAZOLE 5 MG/1
TABLET ORAL
Qty: 45 TABLET | Refills: 1 | Status: SHIPPED | OUTPATIENT
Start: 2024-08-23

## 2024-09-17 ENCOUNTER — TELEPHONE (OUTPATIENT)
Dept: DERMATOLOGY | Facility: CLINIC | Age: 64
End: 2024-09-17
Payer: MEDICARE

## 2024-09-25 DIAGNOSIS — E05.00 GRAVES DISEASE: ICD-10-CM

## 2024-09-25 RX ORDER — METHIMAZOLE 5 MG/1
TABLET ORAL
Qty: 45 TABLET | Refills: 1 | Status: SHIPPED | OUTPATIENT
Start: 2024-09-25

## 2024-09-26 ENCOUNTER — HOSPITAL ENCOUNTER (EMERGENCY)
Facility: HOSPITAL | Age: 64
Discharge: HOME | End: 2024-09-26
Attending: EMERGENCY MEDICINE
Payer: MEDICARE

## 2024-09-26 VITALS
TEMPERATURE: 98 F | HEIGHT: 63 IN | BODY MASS INDEX: 30.12 KG/M2 | DIASTOLIC BLOOD PRESSURE: 73 MMHG | SYSTOLIC BLOOD PRESSURE: 123 MMHG | WEIGHT: 170 LBS | RESPIRATION RATE: 18 BRPM | OXYGEN SATURATION: 95 % | HEART RATE: 72 BPM

## 2024-09-26 DIAGNOSIS — S49.92XA SHOULDER INJURY, LEFT, INITIAL ENCOUNTER: ICD-10-CM

## 2024-09-26 PROCEDURE — 99283 EMERGENCY DEPT VISIT LOW MDM: CPT | Mod: 25,HCNC

## 2024-09-26 NOTE — ED TRIAGE NOTES
Pt states she fell approx 4 wks ago and has been having left shoulder and arm pain.  Pt states she tripped over her dog and caught herself with her left arm.

## 2024-09-26 NOTE — ED PROVIDER NOTES
Encounter Date: 9/26/2024       History     Chief Complaint   Patient presents with    Arm Pain     Pt fell 4 weeks ago and landed on left arm. C/o continued pain to left arm and shoulder. Denies numbness and tingling. Denies chest pain and SOB.      64-year-old female presents with pain to her left shoulder for the past 4 weeks.  She fell 4 weeks ago when she tripped over her dog.  She landed on an outstretched hand on the left and bruised her knees.  Her knees has healed up well.  She no longer has pain to her left wrist or elbow but does have pain to the left shoulder at night.  It hurts to the back of her shoulder.  She is having difficulty lifting her arm above her head on the left.  She feels it pop at times.  Sometimes the pain radiates to the elbow.  Denies any chest pain or shortness of breath.  Pain is worse with movement.  No head or neck injury.        Review of patient's allergies indicates:  No Known Allergies  Past Medical History:   Diagnosis Date    Acute colitis 6/25/2021    Graves disease     Hyperthyroidism     S/P laparoscopic cholecystectomy 6-6-2021 6/24/2021     Past Surgical History:   Procedure Laterality Date    COLONOSCOPY      COLONOSCOPY N/A 6/20/2024    Procedure: COLONOSCOPY;  Surgeon: Kolby Yao MD;  Location: [facility];  Service: Endoscopy;  Laterality: N/A;    LAPAROSCOPIC CHOLECYSTECTOMY N/A 06/06/2021    Procedure: CHOLECYSTECTOMY, LAPAROSCOPIC;  Surgeon: Sandy Delaney MD;  Location: [address];  Service: General;  Laterality: N/A;     No family history on file.  Social History     Tobacco Use    Smoking status: Every Day     Types: Cigarettes    Smokeless tobacco: Never    Tobacco comments:     quit 5/2021   Substance Use Topics    Alcohol use: Never    Drug use: Never     Review of Systems    Physical Exam     Initial Vitals [09/26/24 1659]   BP Pulse Resp Temp SpO2   138/62 86 18 98.4 °F (36.9 °C) 96 %      MAP       --         Physical  Exam    Constitutional: She appears well-developed and well-nourished. No distress.   HENT:   Head: Normocephalic and atraumatic.   Neck: Neck supple.   Musculoskeletal:      Cervical back: Neck supple.      Comments: Full range of motion of left upper extremity.  There is no actual tenderness or deformity to her wrist elbow forearm humerus or shoulder.  She has pain on external rotation and forward flexion of the shoulder.  There is no erythema or effusion.  Normal sensation and motor to the upper extremities.           ED Course   Procedures  Labs Reviewed - No data to display         Imaging Results              X-Ray Shoulder Trauma Left (Final result)  Result time 09/26/24 18:22:44      Final result by Jose G Ross MD (09/26/24 18:22:44)                   Impression:      Mild osteoarthritis in the left shoulder with no acute fracture or dislocation.    Downsloping acromion.  Correlate for impingement.      Electronically signed by: Jose G Ross  Date:    09/26/2024  Time:    18:22               Narrative:    EXAMINATION:  XR SHOULDER TRAUMA 3 VIEW LEFT    CLINICAL HISTORY:  Unspecified injury of left shoulder and upper arm, initial encounter    TECHNIQUE:  Three views of the left shoulder were performed.    COMPARISON  None    FINDINGS:  Bones, joint spaces and soft tissues appear intact.  Downsloping acromion.  Chest wall intact.                                       Medications - No data to display  Medical Decision Making  Patient with persistent pain to her left shoulder after an injury.  Will obtain x-rays to evaluate for fracture or dislocation.  Will refer to orthopedics.  This could be a injury such as a rotator cuff or labral tear.    Amount and/or Complexity of Data Reviewed  Radiology: ordered.                                      Clinical Impression:  Final diagnoses:  [S49.92XA] Shoulder injury, left, initial encounter          ED Disposition Condition    Discharge Stable          ED  Prescriptions    None       Follow-up Information       Follow up With Specialties Details Why Contact Info Additional Information    Alexsander Baxter, NP Nephrology   [facility]  [address] [place] [address]  [phone]       [facility] Schedule an appointment as soon as possible for a visit   [address], [address]  [facility] [address] [phone] [facility] - Main Building, 5th Floor Please park in [facility] and take Atrium elevator    [facility] Emergency Medicine  If symptoms worsen [address]  [facility] [address] [phone]              Jack Andrade MD  09/26/24 6513

## 2024-09-26 NOTE — ED NOTES
Patient identifiers verified and correct for Mariam bernabe  LOC: The patient is awake, alert and aware of environment with an appropriate affect, the patient is oriented x 3 and speaking appropriately.   APPEARANCE: Patient appears comfortable and in no acute distress, patient is clean and well groomed.  SKIN: The skin is warm and dry, color consistent with ethnicity, patient has normal skin turgor and moist mucus membranes, skin intact, no breakdown or bruising noted.   MUSCULOSKELETAL: Patient moving all extremities spontaneously, no swelling noted.  Pain with left shoulder ROM.

## 2024-11-21 ENCOUNTER — OFFICE VISIT (OUTPATIENT)
Dept: ORTHOPEDICS | Facility: CLINIC | Age: 64
End: 2024-11-21
Payer: MEDICARE

## 2024-11-21 DIAGNOSIS — S49.92XA INJURY OF LEFT SHOULDER, INITIAL ENCOUNTER: ICD-10-CM

## 2024-11-21 DIAGNOSIS — G89.29 CHRONIC LEFT SHOULDER PAIN: Primary | ICD-10-CM

## 2024-11-21 DIAGNOSIS — M25.512 CHRONIC LEFT SHOULDER PAIN: Primary | ICD-10-CM

## 2024-11-21 PROCEDURE — 1159F MED LIST DOCD IN RCRD: CPT | Mod: HCNC,CPTII,S$GLB, | Performed by: PHYSICIAN ASSISTANT

## 2024-11-21 PROCEDURE — 1160F RVW MEDS BY RX/DR IN RCRD: CPT | Mod: HCNC,CPTII,S$GLB, | Performed by: PHYSICIAN ASSISTANT

## 2024-11-21 PROCEDURE — 99999 PR PBB SHADOW E&M-EST. PATIENT-LVL III: CPT | Mod: PBBFAC,HCNC,, | Performed by: PHYSICIAN ASSISTANT

## 2024-11-21 PROCEDURE — 99203 OFFICE O/P NEW LOW 30 MIN: CPT | Mod: HCNC,S$GLB,, | Performed by: PHYSICIAN ASSISTANT

## 2024-11-21 NOTE — PROGRESS NOTES
SUBJECTIVE:     Chief Complaint & History of Present Illness:  Mariam Bran is a 64 y.o. year old female who presents today with left shoulder pain that started after a fall in August 2024.  She is Right hand dominant.  The pain is located in the  lateral and  upper arm aspect of the shoulder.  The pain is described as achy.  It is aggravated by lifting, reaching, overhead activity.   Previous treatments include rest, NSAIDS.  She was seen in the ED on 9/25 and had x-rays done. There is not a history of previous injury or surgery to the shoulder.      Review of patient's allergies indicates:  No Known Allergies      Current Outpatient Medications   Medication Sig Dispense Refill    ALPRAZolam (XANAX) 1 MG tablet Take 1 tablet (1 mg total) by mouth 2 (two) times daily as needed (muscle spasms).      amitriptyline (ELAVIL) 150 MG Tab Take 150 mg by mouth every evening.       amLODIPine (NORVASC) 10 MG tablet Take 1 tablet (10 mg total) by mouth once daily. 30 tablet 2    atorvastatin (LIPITOR) 40 MG tablet Take 40 mg by mouth once daily.      blood sugar diagnostic Strp To check BG 3 times daily, to use with insurance preferred meter (Patient not taking: Reported on 6/12/2024) 100 each 0    blood-glucose meter kit To check BG 3 times daily, to use with insurance preferred meter (Patient not taking: Reported on 6/12/2024) 1 each 0    cetirizine (ZYRTEC) 10 MG tablet Take 10 mg by mouth daily as needed. (Patient not taking: Reported on 6/12/2024)      glimepiride (AMARYL) 2 MG tablet Take 1 tablet (2 mg total) by mouth before breakfast. (Patient not taking: Reported on 6/12/2024) 90 tablet 3    hydroCHLOROthiazide (HYDRODIURIL) 50 MG tablet Take 50 mg by mouth.      HYDROcodone-acetaminophen (NORCO)  mg per tablet Take 1 tablet by mouth 4 (four) times daily as needed for Pain.      JARDIANCE 25 mg tablet Take 25 mg by mouth.      lancets Misc To check BG 3 times daily, to use with insurance preferred meter  (Patient not taking: Reported on 6/12/2024) 100 each 0    lisinopriL 10 MG tablet TAKE 1 TABLET BY MOUTH ONCE DAILY 30 tablet 1    methIMAzole (TAPAZOLE) 5 MG Tab Take half a 5 mg tablet a day (2.5 mg dose). 45 tablet 1    ondansetron (ZOFRAN-ODT) 4 MG TbDL Take 1 tablet (4 mg total) by mouth every 6 (six) hours as needed (nausea/vomiting). (Patient not taking: Reported on 6/12/2024) 12 tablet 0    paroxetine (PAXIL) 40 MG tablet Take 40 mg by mouth once daily.        No current facility-administered medications for this visit.       Past Medical History:   Diagnosis Date    Acute colitis 6/25/2021    Graves disease     Hyperthyroidism     S/P laparoscopic cholecystectomy 6-6-2021 6/24/2021       Past Surgical History:   Procedure Laterality Date    COLONOSCOPY      COLONOSCOPY N/A 6/20/2024    Procedure: COLONOSCOPY;  Surgeon: Kolby Yao MD;  Location: [facility];  Service: Endoscopy;  Laterality: N/A;    LAPAROSCOPIC CHOLECYSTECTOMY N/A 06/06/2021    Procedure: CHOLECYSTECTOMY, LAPAROSCOPIC;  Surgeon: Sandy Delaney MD;  Location: [facility] OR [address] [place] [address];  Service: General;  Laterality: N/A;       OBJECTIVE:     PHYSICAL EXAM:    Patient is alert, awake and oriented to time, place and person. Mood and affect are appropriate.  Patient does not appear to be in any distress, denies any constitutional symptoms and appears stated age.   HEENT: Pupils are equal and round, sclera are not injected. External examination of ears and nose reveals no abnormalities. Cranial nerves II-X are grossly intact  Neck: examination demonstrates painless  active range of motion. Spurling's sign is negative  Skin: no rashes, abrasions or open wounds on the affected extremity   Resp: No respiratory distress or audible wheezing   Psych:  normal mood and behavior  CV: 2+  pulses, all extremities warm and well perfused   Left Shoulder   Scapular winging-  Scapular dyskinesia -  Tenderness: deltoid muscle  Range of motion is painful    ROM: forward flexion 120, external rotation 40, internal rotation L5  Shoulder Strength: biceps 5/5, triceps 5/5, abduction 4/5, adduction 5/5  positive for impingement sign, sensory exam normal, and motor exam normal  Special Tests:    Crossbody test: positive    Neer's positive  Hawkin's positive    Kassandra's positive  Drop arm negative      IMAGING:  X-rays of the left shoulder, personally reviewed by me, demonstrate mild DJD.  No fracture or dislocation.     ASSESSMENT     1. Chronic left shoulder pain    2. Injury of left shoulder, initial encounter        PLAN:     Discussed with the patient at length all the different treatment options available for her left shoulder including anti-inflammatories, acetaminophen, rest, ice, Physical therapy, occasional cortisone injections for temporary relief, and shoulder arthroscopy    - Left shoulder MRI- sent to open MRI facility  - Continue rest, HEP, NSAIDS  - will call with results  -  Follow up if symptoms worsen or fail to improve

## 2024-11-27 ENCOUNTER — TELEPHONE (OUTPATIENT)
Dept: ORTHOPEDICS | Facility: CLINIC | Age: 64
End: 2024-11-27
Payer: MEDICARE

## 2024-11-27 NOTE — TELEPHONE ENCOUNTER
Called pt, but pt did not answer. I left a VM for the pt stating that is okay for her to either bring her MRI CD before her appointment date or to keep it until her scheduled appointment time and bring it with her then.  I also informed that Citlaly nor myself will be in clinic until 12/02.       ----- Message from Yaquelin sent at 11/27/2024 11:13 AM CST -----  Regarding: MRI CD  Contact: [phone]  Pt is calling in ref to a CD she received of MRI results. Asking should she drop it off before her 12/17 scheduled appt with provider or just bring it with her. Patient Requesting Call Back @  [phone]

## 2024-12-17 ENCOUNTER — OFFICE VISIT (OUTPATIENT)
Dept: ORTHOPEDICS | Facility: CLINIC | Age: 64
End: 2024-12-17
Payer: MEDICARE

## 2024-12-17 DIAGNOSIS — S46.012A TRAUMATIC INCOMPLETE TEAR OF LEFT ROTATOR CUFF, INITIAL ENCOUNTER: Primary | ICD-10-CM

## 2024-12-17 PROCEDURE — 99212 OFFICE O/P EST SF 10 MIN: CPT | Mod: HCNC,S$GLB,, | Performed by: PHYSICIAN ASSISTANT

## 2024-12-17 NOTE — PROGRESS NOTES
Patient ID: Mariam Bran is a 64 y.o. female.      HISTORY:  Mariam Bran is a 64 y.o. female who returns to me today for follow up of left shoulder pain.  She is here to discuss MRI results.  Her original injury occurred after a fall in August 2024.  Since her last visit she has not had much improvement.    Her pain is worse with reaching, lifting, overhead activity.      PMH/PSH/FamHx/SocHx:    Unchanged from prior visit.    ROS:  Constitution: Negative for chills, fever and weakness.   Respiratory: Negative for cough and shortness of breath.   Musculoskeletal: Positive for left shoulder pain  Psychiatric/Behavioral: The patient is not nervous/anxious.       PHYSICAL EXAM:   Left shoulder  Skin intact  No swelling or effusion  ROM is limited and painful  , ER 40, IR hip  4/5 abduction  + impingement    MRI left shoulder:  Supraspinatus and infraspinatus tendinosis with high grade undersurface tear if not full thickness tear  Mild subacromial Bursitis  Severe AC joint DJD    ASSESSMENT/PLAN:    Diagnoses and all orders for this visit:    Traumatic incomplete tear of left rotator cuff, initial encounter      - Discussed MRI results  - Will refer to to sports medicine to discuss further treatment options  - She will bring MRI disc and report  - Follow up prn

## 2025-01-02 ENCOUNTER — OFFICE VISIT (OUTPATIENT)
Dept: SPORTS MEDICINE | Facility: CLINIC | Age: 65
End: 2025-01-02
Payer: MEDICARE

## 2025-01-02 ENCOUNTER — HOSPITAL ENCOUNTER (OUTPATIENT)
Dept: RADIOLOGY | Facility: HOSPITAL | Age: 65
Discharge: HOME | End: 2025-01-02
Attending: STUDENT IN AN ORGANIZED HEALTH CARE EDUCATION/TRAINING PROGRAM
Payer: MEDICARE

## 2025-01-02 VITALS
HEIGHT: 63 IN | HEART RATE: 101 BPM | SYSTOLIC BLOOD PRESSURE: 165 MMHG | BODY MASS INDEX: 29.75 KG/M2 | DIASTOLIC BLOOD PRESSURE: 70 MMHG | WEIGHT: 167.88 LBS

## 2025-01-02 DIAGNOSIS — M25.512 LEFT SHOULDER PAIN, UNSPECIFIED CHRONICITY: Primary | ICD-10-CM

## 2025-01-02 DIAGNOSIS — M25.512 LEFT SHOULDER PAIN, UNSPECIFIED CHRONICITY: ICD-10-CM

## 2025-01-02 DIAGNOSIS — S46.012A TRAUMATIC COMPLETE TEAR OF LEFT ROTATOR CUFF, INITIAL ENCOUNTER: ICD-10-CM

## 2025-01-02 PROCEDURE — 73030 X-RAY EXAM OF SHOULDER: CPT | Mod: 26,HCNC,LT, | Performed by: RADIOLOGY

## 2025-01-02 PROCEDURE — 3008F BODY MASS INDEX DOCD: CPT | Mod: HCNC,CPTII,S$GLB, | Performed by: STUDENT IN AN ORGANIZED HEALTH CARE EDUCATION/TRAINING PROGRAM

## 2025-01-02 PROCEDURE — 1159F MED LIST DOCD IN RCRD: CPT | Mod: HCNC,CPTII,S$GLB, | Performed by: STUDENT IN AN ORGANIZED HEALTH CARE EDUCATION/TRAINING PROGRAM

## 2025-01-02 PROCEDURE — 99205 OFFICE O/P NEW HI 60 MIN: CPT | Mod: HCNC,S$GLB,, | Performed by: STUDENT IN AN ORGANIZED HEALTH CARE EDUCATION/TRAINING PROGRAM

## 2025-01-02 PROCEDURE — 99999 PR PBB SHADOW E&M-EST. PATIENT-LVL IV: CPT | Mod: PBBFAC,HCNC,, | Performed by: STUDENT IN AN ORGANIZED HEALTH CARE EDUCATION/TRAINING PROGRAM

## 2025-01-02 PROCEDURE — 73030 X-RAY EXAM OF SHOULDER: CPT | Mod: TC,HCNC,LT

## 2025-01-02 PROCEDURE — 3078F DIAST BP <80 MM HG: CPT | Mod: HCNC,CPTII,S$GLB, | Performed by: STUDENT IN AN ORGANIZED HEALTH CARE EDUCATION/TRAINING PROGRAM

## 2025-01-02 PROCEDURE — 3077F SYST BP >= 140 MM HG: CPT | Mod: HCNC,CPTII,S$GLB, | Performed by: STUDENT IN AN ORGANIZED HEALTH CARE EDUCATION/TRAINING PROGRAM

## 2025-01-02 PROCEDURE — 1160F RVW MEDS BY RX/DR IN RCRD: CPT | Mod: HCNC,CPTII,S$GLB, | Performed by: STUDENT IN AN ORGANIZED HEALTH CARE EDUCATION/TRAINING PROGRAM

## 2025-01-02 NOTE — PROGRESS NOTES
"Subjective:          Chief Complaint: Mariam Bran is a 64 y.o. female who had concerns including Pain of the Left Shoulder.    History of Present Illness    CHIEF COMPLAINT:  - Client who is right-handed presents today for initial evaluation of left shoulder pain and decreased range of motion.    HPI:  Client presents with left shoulder pain ongoing for approximately four months. The pain is located in the back of the shoulder, often preventing comfortable arm rest on the couch and requiring her to sit sideways. The pain has recently moved up the arm. Increased pain occurs with certain movements, including lifting the arm, reaching backwards, putting on a seat belt, or wearing a jacket. She reports occasional tingling sensations in the left hand, described as feeling like "needles," though this problem has occurred before. The shoulder pain is affecting sleep. She notes weakness in the affected arm, stating, "I have been trying not to use this arm if I don't have to."    She initially sought care at the emergency room, which led to a referral to Dr. Galo. She has not undergone any physical therapy or injections for the shoulder issue. There is a history of neck problems, with pain management for approximately 30 years.    Client denies any recent trauma or injury to the shoulder area.    WORK STATUS:  - Not currently working        Past Medical History:   Diagnosis Date    Acute colitis 6/25/2021    Graves disease     Hyperthyroidism     S/P laparoscopic cholecystectomy 6-6-2021 6/24/2021       Current Outpatient Medications on File Prior to Visit   Medication Sig Dispense Refill    ALPRAZolam (XANAX) 1 MG tablet Take 1 tablet (1 mg total) by mouth 2 (two) times daily as needed (muscle spasms).      amitriptyline (ELAVIL) 150 MG Tab Take 150 mg by mouth every evening.       atorvastatin (LIPITOR) 40 MG tablet Take 40 mg by mouth once daily.      hydroCHLOROthiazide (HYDRODIURIL) 50 MG tablet Take 50 mg by " mouth.      HYDROcodone-acetaminophen (NORCO)  mg per tablet Take 1 tablet by mouth 4 (four) times daily as needed for Pain.      JARDIANCE 25 mg tablet Take 25 mg by mouth.      lisinopriL 10 MG tablet TAKE 1 TABLET BY MOUTH ONCE DAILY 30 tablet 1    methIMAzole (TAPAZOLE) 5 MG Tab Take half a 5 mg tablet a day (2.5 mg dose). 45 tablet 1    paroxetine (PAXIL) 40 MG tablet Take 40 mg by mouth once daily.       amLODIPine (NORVASC) 10 MG tablet Take 1 tablet (10 mg total) by mouth once daily. 30 tablet 2    blood sugar diagnostic Strp To check BG 3 times daily, to use with insurance preferred meter (Patient not taking: Reported on 1/2/2025) 100 each 0    blood-glucose meter kit To check BG 3 times daily, to use with insurance preferred meter (Patient not taking: Reported on 6/12/2024) 1 each 0    cetirizine (ZYRTEC) 10 MG tablet Take 10 mg by mouth daily as needed. (Patient not taking: Reported on 1/2/2025)      glimepiride (AMARYL) 2 MG tablet Take 1 tablet (2 mg total) by mouth before breakfast. (Patient not taking: Reported on 6/12/2024) 90 tablet 3    lancets Misc To check BG 3 times daily, to use with insurance preferred meter (Patient not taking: Reported on 1/2/2025) 100 each 0    ondansetron (ZOFRAN-ODT) 4 MG TbDL Take 1 tablet (4 mg total) by mouth every 6 (six) hours as needed (nausea/vomiting). (Patient not taking: Reported on 1/2/2025) 12 tablet 0     No current facility-administered medications on file prior to visit.       Past Surgical History:   Procedure Laterality Date    COLONOSCOPY      COLONOSCOPY N/A 6/20/2024    Procedure: COLONOSCOPY;  Surgeon: Kolby Yao MD;  Location: [facility];  Service: Endoscopy;  Laterality: N/A;    LAPAROSCOPIC CHOLECYSTECTOMY N/A 06/06/2021    Procedure: CHOLECYSTECTOMY, LAPAROSCOPIC;  Surgeon: Sandy Delaney MD;  Location: [address];  Service: General;  Laterality: N/A;       No family history on file.    Social History      Socioeconomic History    Marital status:    Tobacco Use    Smoking status: Every Day     Types: Cigarettes    Smokeless tobacco: Never    Tobacco comments:     quit 5/2021   Substance and Sexual Activity    Alcohol use: Never    Drug use: Never       Review of Systems   Constitutional: Negative.   HENT: Negative.     Eyes: Negative.    Cardiovascular: Negative.    Respiratory: Negative.     Endocrine: Negative.    Hematologic/Lymphatic: Negative.    Skin: Negative.    Musculoskeletal:  Positive for joint pain and muscle weakness.   Neurological: Negative.    Psychiatric/Behavioral: Negative.     Allergic/Immunologic: Negative.      Pain Related Questions  Over the past 3 days, what was your average pain during activity? (I.e. running, jogging, walking, climbing stairs, getting dressed, ect.): 8  Over the past 3 days, what was your highest pain level?: 10  Over the past 3 days, what was your lowest pain level? : 6    Other  How many nights a week are you awakened by your affected body part?: 6  Was the patient's HEIGHT measured or patient reported?: Patient Reported  Was the patient's WEIGHT measured or patient reported?: Measured      Objective:        General: Mariam is well-developed, well-nourished, appears stated age, in no acute distress, alert and oriented to time, place and person.     General    Nursing note and vitals reviewed.  Constitutional: She is oriented to person, place, and time. She appears well-developed and well-nourished. No distress.   HENT:   Head: Normocephalic and atraumatic.   Nose: Nose normal.   Eyes: EOM are normal.   Cardiovascular:  Intact distal pulses.            Pulmonary/Chest: Effort normal. No respiratory distress.   Neurological: She is alert and oriented to person, place, and time.   Psychiatric: She has a normal mood and affect. Her behavior is normal. Judgment and thought content normal.         Right Shoulder Exam     Inspection/Observation   Swelling:  absent  Bruising: absent  Scars: absent  Deformity: absent  Scapular Winging: absent  Scapular Dyskinesia: negative  Atrophy: absent    Tenderness   The patient is experiencing no tenderness.    Range of Motion   Active abduction:  170   Passive abduction:  170   Forward Flexion:  180   Forward Elevation: 180  External Rotation 0 degrees:  60   Internal rotation 0 degrees:  Mid thoracic     Other   Sensation: normal    Left Shoulder Exam     Inspection/Observation   Swelling: absent  Bruising: absent  Scars: absent  Deformity: absent  Scapular Winging: absent  Scapular Dyskinesia: positive  Atrophy: absent    Tenderness   The patient is tender to palpation of the biceps tendon and greater tuberosity.    Range of Motion   Active abduction:  150   Passive abduction:  170   Forward Flexion:  160   Forward Elevation: 160  External Rotation 0 degrees:  30   Internal rotation 0 degrees:  Lumbar     Tests & Signs   Cross arm: negative  Dupree test: positive  Impingement: positive  Rotator Cuff Painful Arc/Range: moderate  Lift Off Sign: negative  Belly Press: negative  Active Compression Test ([place]'s Sign): positive  Speed's Test: negative  Bear Hug: negative    Other   Sensation: normal       Muscle Strength   Right Upper Extremity   Shoulder Abduction: 5/5   Shoulder Internal Rotation: 5/5   Shoulder External Rotation: 5/5   Supraspinatus: 5/5   Subscapularis: 5/5   Biceps: 5/5   Left Upper Extremity  Shoulder Abduction: 4/5   Shoulder Internal Rotation: 5/5   Shoulder External Rotation: 4/5   Supraspinatus: 4/5   Subscapularis: 5/5   Biceps: 5/5     Vascular Exam     Right Pulses      Radial:                    2+      Left Pulses      Radial:                    2+      Capillary Refill  Right Hand: normal capillary refill  Left Hand: normal capillary refill        Imaging:   X-rays left shoulder from 01/02/2025 personally viewed by me on that day these include AP, Grashey, scapular Y, axillary.  Glenohumeral joint  is reduced.  No fracture.  No arthritic changes.      MRI from an outside institution uploaded personally viewed by me 01/02/2025.  Full-thickness rotator cuff tear supra and infraspinatus tendon.  Mild arthritic changes.  Tendinitis long head of biceps.  Synovitis throughout the joint.      Assessment:   Mariam Bran is a 64 y.o. female with left rotator cuff tear  1. Left shoulder pain, unspecified chronicity  X-Ray Shoulder 2 or More Views Left      2. Traumatic complete tear of left rotator cuff, initial encounter                 Plan:         Assessment & Plan    LIFESTYLE:  - Recommend cutting smoking either completely out or as much as possible to improve blood flow and healing after surgery.    REFERRALS:  - Refer to PCP for pre-operative clearance and to fax medical clearance for surgery.    RETURN TO ACTIVITY:  - Restrict lifting to no more than 5 lbs for about 3 months after surgery.  - Place arm in a sling for about 6 weeks after surgery.  - Recovery may involve full return to all activities in about 6 months after surgery.    PROCEDURES:  - Discussed surgical and non-surgical treatment options for rotator cuff tear. Recommend arthroscopic rotator cuff repair surgery. Explained surgical procedure, including fixing the rotator cuff arthroscopically and addressing all issues in the shoulder. Discussed potential steroid injection as part of non-surgical treatment, but warned about risks of multiple injections weakening rotator cuff tissue. Client understands the risks and benefits and elects to proceed with surgery after discussion of options. Schedule outpatient surgery at the facility across the street ([address]).         The treatment options for rotator cuff tears were discussed at length with the patient and all their questions were answered.  First we discussed non operative management.  I explained that this would include a course of physical therapy to work on shoulder and periscapular motion,  strengthening, and stabilization.  This can be followed with transition to a home exercise program.  We also discussed corticosteroid injections into the shoulder.  This included the risks and benefits of injections.  I explained that multiple corticosteroid injections can be detrimental to the rotator cuff tendons and possibly lead to increased risk of failure of subsequent surgical repair.  They voiced understanding.  We then discussed surgical intervention.  I explained that this would entail shoulder arthroscopy with Rotator Cuff Repair, Subacromial Decompression, Arthroscopic Biceps Tenodesis, and Extensive Debridement of Shoulder.  The procedures, as well as the risks, benefits, and rehabilitation protocols were discussed at length with the patient.    We will plan on proceeding with Rotator Cuff Repair, Subacromial Decompression, Arthroscopic Biceps Tenodesis, and Extensive Debridement of Shoulder on 1-.  she does need preoperative clearance from their PCP.      The risks, benefits and alternatives to surgery were discussed with the patient at great length.  These include, but are not limited to, bleeding, infection, vessel/nerve damage, pain, numbness, tingling, complex regional pain syndrome, hardware/surgical failure, need for further surgery, tissue retear, failure of repair, cosmetic deformity, failure of repair of other structures, damage to surrounding neurovascular structures, persistent pain, stiffness, DVT, PE, arthritis and death.  Patient states an understanding and wishes to proceed with surgery.   All questions were answered.  No guarantees were implied or stated.

## 2025-01-06 DIAGNOSIS — M65.912 SYNOVITIS OF LEFT SHOULDER: ICD-10-CM

## 2025-01-06 DIAGNOSIS — S46.012A TRAUMATIC COMPLETE TEAR OF LEFT ROTATOR CUFF, INITIAL ENCOUNTER: Primary | ICD-10-CM

## 2025-01-06 DIAGNOSIS — M19.012 ARTHRITIS OF SHOULDER REGION, LEFT: ICD-10-CM

## 2025-01-06 DIAGNOSIS — M75.22 BICEPS TENDINITIS OF LEFT UPPER EXTREMITY: ICD-10-CM

## 2025-01-25 NOTE — ANESTHESIA PAT ROS NOTE
01/25/2025  Mariam Bran is a 64 y.o., female.      Pre-op Assessment    I have reviewed the Patient Summary Reports.       I have reviewed the Medications.     Review of Systems  Anesthesia Hx:  No problems with previous Anesthesia   History of prior surgery of interest to airway management or planning:  Previous anesthesia: General, MAC Lap. josé miguel. 6/6/21 with general anesthesia.  Procedure performed at an [facility].      Colonoscopy 6/20/24 with MAC.  Procedure performed at an [facility]. Airway issues documented on chart review include mask, easy, easy direct laryngoscopy, GETA , view on direct laryngoscopy Grade I      Denies Personal Hx of Anesthesia complications.                    Social:  No Alcohol Use, Smoker       Hematology/Oncology:    Oncology Normal    -- Anemia:               Hematology Comments: Vitamin D deficiency                    EENT/Dental:  EENT/Dental Normal           Cardiovascular:  Exercise tolerance: good   Hypertension, well controlled    Denies CAD.       Denies Angina.     hyperlipidemia  Denies DRAKE.  ECG has been reviewed. Venous insufficiency Patient not on beta blockers                          Pulmonary:  Pulmonary Normal   Denies COPD.  Denies Asthma.   Denies Shortness of breath.   Pulmonary nodule               Renal/:  Renal/ Normal                 Hepatic/GI:      Denies GERD.   Laprascopic cholecystectomy, Hx C-diff Not Taking GLP-1 Agonists            Musculoskeletal:  Arthritis   Traumatic complete tear of left rotator cuff,   Biceps tendinitis of left upper extremity,  Arthritis of shoulder region, left,  Synovitis of left shoulder              Neurological:  Neurology Normal   Denies CVA.    Denies Headaches. Denies Seizures.                                Endocrine:  Diabetes, well controlled, type 2  Hyperthyroidism Graves disease,  HA1C =  5.9 on 10/11/2023        Psych:  Psychiatric History anxiety                Past Medical History:   Diagnosis Date    Acute colitis 6/25/2021    Graves disease     Hyperthyroidism     S/P laparoscopic cholecystectomy 6-6-2021 6/24/2021     Past Surgical History:   Procedure Laterality Date    COLONOSCOPY      COLONOSCOPY N/A 6/20/2024    Procedure: COLONOSCOPY;  Surgeon: Kolby Yao MD;  Location: [facility];  Service: Endoscopy;  Laterality: N/A;    LAPAROSCOPIC CHOLECYSTECTOMY N/A 06/06/2021    Procedure: CHOLECYSTECTOMY, LAPAROSCOPIC;  Surgeon: Sandy Delaney MD;  Location: [facility] OR [address] [place] [address];  Service: General;  Laterality: N/A;       Anesthesia Assessment: Preoperative EQUATION    Planned Procedure: Procedure(s) (LRB):  ARTHROSCOPY SHOULDER REPAIR, ROTATOR CUFF (Left)  ARTHROSCOPY,SHOULDER,WITH BICEPS TENODESIS (Left)  ARTHROSCOPY, SHOULDER, WITH PARTIAL SYNOVECTOMY (Left)  Requested Anesthesia Type:Regional  Surgeon: Manpreet Webb MD  Service: Orthopedics  Known or anticipated Date of Surgery:1/31/2025    Surgeon notes: reviewed    Electronic QUestionnaire Assessment completed via nurse interview with patient.        Triage considerations:     The patient has no apparent active cardiac condition (No unstable coronary Syndrome such as severe unstable angina or recent [<1 month] myocardial infarction, decompensated CHF, severe valvular   disease or significant arrhythmia)    Previous anesthesia records:GETA, MAC, Easy airway, Easy intubation, and No problems, 7.0 ETT, Alicia 2, Grade 1    Last PCP note: 3-6 months ago , outside [facility]   Subspecialty notes: Dermatology, Endocrinology, Gastroenterology, Vascular Surgery    Other important co-morbidities:Obesity,  DM2, HLD, HTN, Smoker, and Graves ds       Tests already available:  Results have been reviewed.       EKG 9/22/2021:  Vent. Rate : 068 BPM     Atrial Rate : 068 BPM      P-R Int : 142 ms          QRS Dur : 100 ms       QT Int : 394 ms       " P-R-T Axes : 069 046 054 degrees      QTc Int : 418 ms   Normal sinus rhythm   Nonspecific T wave abnormality   Abnormal ECG   Confirmed by David Flores MD ([id number]) on 9/23/2021 9:39:06 AM       Echo 6/24/2021:  Summary  Show Result ComparisonThe left ventricle is normal in size with concentric remodeling and normal systolic function.  The estimated ejection fraction is 63%.  Normal left ventricular diastolic function.  Normal right ventricular size with normal right ventricular systolic function.  Mild right atrial enlargement.  Mild tricuspid regurgitation.  Normal central venous pressure (3 mmHg).  The estimated PA systolic pressure is 32 mmHg.                Instructions: Called pt, given preop instructions, verbalized understanding.     Optimization:  Anesthesia Preop Clinic Assessment Not Indicated    Medical Opinion Indicated: Yes, PCP       Sub-specialist consult indicated: No      Plan:     Consultation:Patient's PCP for a statement of optimization     Patient  has previously scheduled Medical Appointment:1/25/2025    Navigation: Patient is optimized for surgery by PCP.      Ht: 5'3"  Wt: 167lbs  BMI: 29.74  "

## 2025-01-28 ENCOUNTER — OFFICE VISIT (OUTPATIENT)
Dept: SPORTS MEDICINE | Facility: CLINIC | Age: 65
End: 2025-01-28
Payer: MEDICARE

## 2025-01-28 VITALS
HEART RATE: 83 BPM | HEIGHT: 63 IN | SYSTOLIC BLOOD PRESSURE: 145 MMHG | DIASTOLIC BLOOD PRESSURE: 76 MMHG | WEIGHT: 169.56 LBS | BODY MASS INDEX: 30.04 KG/M2

## 2025-01-28 DIAGNOSIS — S46.012A TRAUMATIC COMPLETE TEAR OF LEFT ROTATOR CUFF, INITIAL ENCOUNTER: Primary | ICD-10-CM

## 2025-01-28 PROCEDURE — 99499 UNLISTED E&M SERVICE: CPT | Mod: HCNC,S$GLB,, | Performed by: ORTHOPAEDIC SURGERY

## 2025-01-28 PROCEDURE — 99999 PR PBB SHADOW E&M-EST. PATIENT-LVL IV: CPT | Mod: PBBFAC,HCNC,, | Performed by: ORTHOPAEDIC SURGERY

## 2025-01-28 RX ORDER — SODIUM CHLORIDE 9 MG/ML
INJECTION, SOLUTION INTRAVENOUS CONTINUOUS
Status: CANCELLED | OUTPATIENT
Start: 2025-01-28

## 2025-01-28 RX ORDER — ASPIRIN 81 MG/1
81 TABLET ORAL DAILY
Qty: 42 TABLET | Refills: 0 | Status: SHIPPED | OUTPATIENT
Start: 2025-01-28 | End: 2025-03-14

## 2025-01-28 RX ORDER — CELECOXIB 200 MG/1
200 CAPSULE ORAL 2 TIMES DAILY WITH MEALS
Qty: 60 CAPSULE | Refills: 0 | Status: SHIPPED | OUTPATIENT
Start: 2025-01-28

## 2025-01-28 RX ORDER — OXYCODONE HYDROCHLORIDE 5 MG/1
5 TABLET ORAL EVERY 6 HOURS PRN
Qty: 28 TABLET | Refills: 0 | Status: SHIPPED | OUTPATIENT
Start: 2025-01-28

## 2025-01-28 RX ORDER — METHOCARBAMOL 500 MG/1
500 TABLET, FILM COATED ORAL 3 TIMES DAILY PRN
Qty: 30 TABLET | Refills: 0 | Status: SHIPPED | OUTPATIENT
Start: 2025-01-28

## 2025-01-28 RX ORDER — PROMETHAZINE HYDROCHLORIDE 25 MG/1
25 TABLET ORAL EVERY 6 HOURS PRN
Qty: 30 TABLET | Refills: 0 | Status: SHIPPED | OUTPATIENT
Start: 2025-01-28

## 2025-01-28 NOTE — PRE-PROCEDURE INSTRUCTIONS
Spoke to Patient on phone, verified name and . Preop medication instructions given, verbalized understanding.

## 2025-01-28 NOTE — H&P (VIEW-ONLY)
Mariam Bran  is here for a completion of her perioperative paperwork. she  Is scheduled to undergo:    LEFT Shoulder  Arthroscopy   Rotator cuff repair versus debridement, possible open   Subacromial decompression   Arthroscopic biceps tenodesis   All other indicated procedures     on 01/31/2025.  She is a healthy individual and does need clearance for this procedure.     Patient cleared by PCP for surgery    Risks, indications and benefits of the surgical procedure were discussed with the patient. All questions with regard to surgery, rehab, expected return to functional activities, activities of daily living and recreational endeavors were answered to her satisfaction.    Discussed COVID-19 with the patient, they are aware of our current policies and procedures, were given the option of delaying surgery, and they elect to proceed.    Patient was informed and understands the risks of surgery are greater for patients with a current condition or history of heart disease, obesity, clotting disorders, recurrent infections, steroid use, current or past smoking, and factors such as sedentary lifestyle and noncompliance with medications, therapy or follow-up. The degree of the increased risk is hard to estimate with any degree of precision.    Once no other questions were asked, a brief history and physical exam was then performed.    PAST MEDICAL HISTORY:   Past Medical History:   Diagnosis Date    Acute colitis 6/25/2021    Graves disease     Hyperthyroidism     S/P laparoscopic cholecystectomy 6-6-2021 6/24/2021     PAST SURGICAL HISTORY:   Past Surgical History:   Procedure Laterality Date    COLONOSCOPY      COLONOSCOPY N/A 6/20/2024    Procedure: COLONOSCOPY;  Surgeon: Kolby Yao MD;  Location: [facility];  Service: Endoscopy;  Laterality: N/A;    LAPAROSCOPIC CHOLECYSTECTOMY N/A 06/06/2021    Procedure: CHOLECYSTECTOMY, LAPAROSCOPIC;  Surgeon: Sandy Delaney MD;  Location: [facility] OR [address] [place] [address];  Service:  General;  Laterality: N/A;     FAMILY HISTORY: No family history on file.  SOCIAL HISTORY:   Social History     Socioeconomic History    Marital status:    Tobacco Use    Smoking status: Every Day     Types: Cigarettes    Smokeless tobacco: Never    Tobacco comments:     quit 5/2021   Substance and Sexual Activity    Alcohol use: Never    Drug use: Never       MEDICATIONS:   Current Outpatient Medications:     ALPRAZolam (XANAX) 1 MG tablet, Take 1 tablet (1 mg total) by mouth 2 (two) times daily as needed (muscle spasms)., Disp: , Rfl:     amitriptyline (ELAVIL) 150 MG Tab, Take 150 mg by mouth every evening. , Disp: , Rfl:     amLODIPine (NORVASC) 10 MG tablet, Take 1 tablet (10 mg total) by mouth once daily., Disp: 30 tablet, Rfl: 2    atorvastatin (LIPITOR) 40 MG tablet, Take 40 mg by mouth once daily., Disp: , Rfl:     blood sugar diagnostic Strp, To check BG 3 times daily, to use with insurance preferred meter (Patient not taking: Reported on 1/2/2025), Disp: 100 each, Rfl: 0    blood-glucose meter kit, To check BG 3 times daily, to use with insurance preferred meter (Patient not taking: Reported on 6/12/2024), Disp: 1 each, Rfl: 0    cetirizine (ZYRTEC) 10 MG tablet, Take 10 mg by mouth daily as needed. (Patient not taking: Reported on 6/12/2024), Disp: , Rfl:     glimepiride (AMARYL) 2 MG tablet, Take 1 tablet (2 mg total) by mouth before breakfast. (Patient not taking: Reported on 6/12/2024), Disp: 90 tablet, Rfl: 3    hydroCHLOROthiazide (HYDRODIURIL) 50 MG tablet, Take 50 mg by mouth., Disp: , Rfl:     HYDROcodone-acetaminophen (NORCO)  mg per tablet, Take 1 tablet by mouth 4 (four) times daily as needed for Pain., Disp: , Rfl:     JARDIANCE 25 mg tablet, Take 25 mg by mouth., Disp: , Rfl:     lancets Misc, To check BG 3 times daily, to use with insurance preferred meter (Patient not taking: Reported on 1/2/2025), Disp: 100 each, Rfl: 0    lisinopriL 10 MG tablet, TAKE 1 TABLET BY MOUTH ONCE  DAILY, Disp: 30 tablet, Rfl: 1    methIMAzole (TAPAZOLE) 5 MG Tab, Take half a 5 mg tablet a day (2.5 mg dose)., Disp: 45 tablet, Rfl: 1    ondansetron (ZOFRAN-ODT) 4 MG TbDL, Take 1 tablet (4 mg total) by mouth every 6 (six) hours as needed (nausea/vomiting). (Patient not taking: Reported on 6/12/2024), Disp: 12 tablet, Rfl: 0    paroxetine (PAXIL) 40 MG tablet, Take 40 mg by mouth once daily. , Disp: , Rfl:   ALLERGIES: Review of patient's allergies indicates:  No Known Allergies    Review of Systems   Constitution: Negative. Negative for chills, fever and night sweats.   HENT: Negative for congestion and headaches.    Eyes: Negative for blurred vision, left vision loss and right vision loss.   Cardiovascular: Negative for chest pain and syncope.   Respiratory: Negative for cough and shortness of breath.    Endocrine: Negative for polydipsia, polyphagia and polyuria.   Hematologic/Lymphatic: Negative for bleeding problem. Does not bruise/bleed easily.   Skin: Negative for dry skin, itching and rash.   Musculoskeletal: Negative for falls and muscle weakness.   Gastrointestinal: Negative for abdominal pain and bowel incontinence.   Genitourinary: Negative for bladder incontinence and nocturia.   Neurological: Negative for disturbances in coordination, loss of balance and seizures.   Psychiatric/Behavioral: Negative for depression. The patient does not have insomnia.    Allergic/Immunologic: Negative for hives and persistent infections.     PHYSICAL EXAM:  GEN: A&Ox3, WD WN NAD  HEENT: WNL  CHEST: CTAB, no W/R/R  HEART: RRR, no M/R/G   ABD: Soft, NT ND, BS x4 QUADS  MS: Refer to previous note for detailed MS exam  NEURO: CN II-XII intact       The surgical consent was then reviewed with the patient, who agreed with all the contents of the consent form and it was signed. she was instructed to wait for a phone call from the anesthesia department prior to surgery to discuss past medical history, medications, and  clearance. Also, informed she may be required to get additional testing per the anesthesia department prior to having surgery.     PHYSICAL THERAPY:  She was also instructed regarding physical therapy and will begin POD # 1-3. She is doing physical therapy at [facility]. [place]    POST OP CARE:instructions were reviewed including care of the wound and dressing after surgery and when she can shower.     PAIN MANAGEMENT: Mariam Bran was also given a pain management regime, which includes the TENS unit, which she already has. She was also instructed regarding the Polar ice unit that will be in place after surgery and her postoperative pain medications.     PAIN MEDICATION:  Roxicodone (Oxycodone) 5 mg po q 4-6 hours prn pain   Phenergan 25 mg one p.o. q.4-6 hours prn nausea and vomiting.  Celebrex 200 mg BID with meals  Robaxin 500mg q 8 hours prn pain    Post op meds to be delivered bedside prior to discharge. Deliver to family if patient is in surgery at 5pm.   Patient denies history of seizures.     Patient instructed not to take narcotic pain medication at the same time as benzodiazepines.  She will also refrain from taking any narcotic pain medication prescribed to her by another physician until her postop pain is controlled.  Patient expressed understanding with the above.    Patient will also use bilateral TEDs on lower extremities, SCDs during surgery, and early ambulation post-op. If the patient was previously taking 81mg baby aspirin, they were told to not take it will using the above stated aspirin and to restart the 81mg aspirin after completion of the aspirin dose.       Patient was also told to buy over the counter Prilosec medication and take it once daily for GI protection as long as they are taking NSAIDs or Aspirin.    DVT prophylaxis was discussed with the patient today including risk factors for developing DVTs and history of DVTs. The patient was asked if any  specific recommendations were given from the doctor/s that did pre-operative surgical clearance.      The patient was told that narcotic pain medications may make them drowsy and instructions were given to not sign legal documents, drive or operate heavy machinery, cars, or equipment while under the influence of narcotic medications.     My supervising physician Manpreet Webb MD was also present during the appointment.  He discussed with the patient all the potential complications, risks, and benefits of their upcoming surgery.    As there were no other questions to be asked, she was given my business card along with Manpreet Webb'MD bishop business card if she has any questions or concerns prior to surgery or in the postop period.

## 2025-01-28 NOTE — H&P
Mariam Bran  is here for a completion of her perioperative paperwork. she  Is scheduled to undergo:    LEFT Shoulder  Arthroscopy   Rotator cuff repair versus debridement, possible open   Subacromial decompression   Arthroscopic biceps tenodesis   All other indicated procedures     on 01/31/2025.  She is a healthy individual and does need clearance for this procedure.     Patient cleared by PCP for surgery    Risks, indications and benefits of the surgical procedure were discussed with the patient. All questions with regard to surgery, rehab, expected return to functional activities, activities of daily living and recreational endeavors were answered to her satisfaction.    Discussed COVID-19 with the patient, they are aware of our current policies and procedures, were given the option of delaying surgery, and they elect to proceed.    Patient was informed and understands the risks of surgery are greater for patients with a current condition or history of heart disease, obesity, clotting disorders, recurrent infections, steroid use, current or past smoking, and factors such as sedentary lifestyle and noncompliance with medications, therapy or follow-up. The degree of the increased risk is hard to estimate with any degree of precision.    Once no other questions were asked, a brief history and physical exam was then performed.    PAST MEDICAL HISTORY:   Past Medical History:   Diagnosis Date    Acute colitis 6/25/2021    Graves disease     Hyperthyroidism     S/P laparoscopic cholecystectomy 6-6-2021 6/24/2021     PAST SURGICAL HISTORY:   Past Surgical History:   Procedure Laterality Date    COLONOSCOPY      COLONOSCOPY N/A 6/20/2024    Procedure: COLONOSCOPY;  Surgeon: Kolby Yao MD;  Location: [facility];  Service: Endoscopy;  Laterality: N/A;    LAPAROSCOPIC CHOLECYSTECTOMY N/A 06/06/2021    Procedure: CHOLECYSTECTOMY, LAPAROSCOPIC;  Surgeon: Sandy Delaney MD;  Location: [facility] OR [address] [place] [address];  Service:  General;  Laterality: N/A;     FAMILY HISTORY: No family history on file.  SOCIAL HISTORY:   Social History     Socioeconomic History    Marital status:    Tobacco Use    Smoking status: Every Day     Types: Cigarettes    Smokeless tobacco: Never    Tobacco comments:     quit 5/2021   Substance and Sexual Activity    Alcohol use: Never    Drug use: Never       MEDICATIONS:   Current Outpatient Medications:     ALPRAZolam (XANAX) 1 MG tablet, Take 1 tablet (1 mg total) by mouth 2 (two) times daily as needed (muscle spasms)., Disp: , Rfl:     amitriptyline (ELAVIL) 150 MG Tab, Take 150 mg by mouth every evening. , Disp: , Rfl:     amLODIPine (NORVASC) 10 MG tablet, Take 1 tablet (10 mg total) by mouth once daily., Disp: 30 tablet, Rfl: 2    atorvastatin (LIPITOR) 40 MG tablet, Take 40 mg by mouth once daily., Disp: , Rfl:     blood sugar diagnostic Strp, To check BG 3 times daily, to use with insurance preferred meter (Patient not taking: Reported on 1/2/2025), Disp: 100 each, Rfl: 0    blood-glucose meter kit, To check BG 3 times daily, to use with insurance preferred meter (Patient not taking: Reported on 6/12/2024), Disp: 1 each, Rfl: 0    cetirizine (ZYRTEC) 10 MG tablet, Take 10 mg by mouth daily as needed. (Patient not taking: Reported on 6/12/2024), Disp: , Rfl:     glimepiride (AMARYL) 2 MG tablet, Take 1 tablet (2 mg total) by mouth before breakfast. (Patient not taking: Reported on 6/12/2024), Disp: 90 tablet, Rfl: 3    hydroCHLOROthiazide (HYDRODIURIL) 50 MG tablet, Take 50 mg by mouth., Disp: , Rfl:     HYDROcodone-acetaminophen (NORCO)  mg per tablet, Take 1 tablet by mouth 4 (four) times daily as needed for Pain., Disp: , Rfl:     JARDIANCE 25 mg tablet, Take 25 mg by mouth., Disp: , Rfl:     lancets Misc, To check BG 3 times daily, to use with insurance preferred meter (Patient not taking: Reported on 1/2/2025), Disp: 100 each, Rfl: 0    lisinopriL 10 MG tablet, TAKE 1 TABLET BY MOUTH ONCE  DAILY, Disp: 30 tablet, Rfl: 1    methIMAzole (TAPAZOLE) 5 MG Tab, Take half a 5 mg tablet a day (2.5 mg dose)., Disp: 45 tablet, Rfl: 1    ondansetron (ZOFRAN-ODT) 4 MG TbDL, Take 1 tablet (4 mg total) by mouth every 6 (six) hours as needed (nausea/vomiting). (Patient not taking: Reported on 6/12/2024), Disp: 12 tablet, Rfl: 0    paroxetine (PAXIL) 40 MG tablet, Take 40 mg by mouth once daily. , Disp: , Rfl:   ALLERGIES: Review of patient's allergies indicates:  No Known Allergies    Review of Systems   Constitution: Negative. Negative for chills, fever and night sweats.   HENT: Negative for congestion and headaches.    Eyes: Negative for blurred vision, left vision loss and right vision loss.   Cardiovascular: Negative for chest pain and syncope.   Respiratory: Negative for cough and shortness of breath.    Endocrine: Negative for polydipsia, polyphagia and polyuria.   Hematologic/Lymphatic: Negative for bleeding problem. Does not bruise/bleed easily.   Skin: Negative for dry skin, itching and rash.   Musculoskeletal: Negative for falls and muscle weakness.   Gastrointestinal: Negative for abdominal pain and bowel incontinence.   Genitourinary: Negative for bladder incontinence and nocturia.   Neurological: Negative for disturbances in coordination, loss of balance and seizures.   Psychiatric/Behavioral: Negative for depression. The patient does not have insomnia.    Allergic/Immunologic: Negative for hives and persistent infections.     PHYSICAL EXAM:  GEN: A&Ox3, WD WN NAD  HEENT: WNL  CHEST: CTAB, no W/R/R  HEART: RRR, no M/R/G   ABD: Soft, NT ND, BS x4 QUADS  MS: Refer to previous note for detailed MS exam  NEURO: CN II-XII intact       The surgical consent was then reviewed with the patient, who agreed with all the contents of the consent form and it was signed. she was instructed to wait for a phone call from the anesthesia department prior to surgery to discuss past medical history, medications, and  clearance. Also, informed she may be required to get additional testing per the anesthesia department prior to having surgery.     PHYSICAL THERAPY:  She was also instructed regarding physical therapy and will begin POD # 1-3. She is doing physical therapy at [facility]. [place]    POST OP CARE:instructions were reviewed including care of the wound and dressing after surgery and when she can shower.     PAIN MANAGEMENT: Mariam Bran was also given a pain management regime, which includes the TENS unit, which she already has. She was also instructed regarding the Polar ice unit that will be in place after surgery and her postoperative pain medications.     PAIN MEDICATION:  Roxicodone (Oxycodone) 5 mg po q 4-6 hours prn pain   Phenergan 25 mg one p.o. q.4-6 hours prn nausea and vomiting.  Celebrex 200 mg BID with meals  Robaxin 500mg q 8 hours prn pain    Post op meds to be delivered bedside prior to discharge. Deliver to family if patient is in surgery at 5pm.   Patient denies history of seizures.     Patient instructed not to take narcotic pain medication at the same time as benzodiazepines.  She will also refrain from taking any narcotic pain medication prescribed to her by another physician until her postop pain is controlled.  Patient expressed understanding with the above.    Patient will also use bilateral TEDs on lower extremities, SCDs during surgery, and early ambulation post-op. If the patient was previously taking 81mg baby aspirin, they were told to not take it will using the above stated aspirin and to restart the 81mg aspirin after completion of the aspirin dose.       Patient was also told to buy over the counter Prilosec medication and take it once daily for GI protection as long as they are taking NSAIDs or Aspirin.    DVT prophylaxis was discussed with the patient today including risk factors for developing DVTs and history of DVTs. The patient was asked if any  specific recommendations were given from the doctor/s that did pre-operative surgical clearance.      The patient was told that narcotic pain medications may make them drowsy and instructions were given to not sign legal documents, drive or operate heavy machinery, cars, or equipment while under the influence of narcotic medications.     My supervising physician Manpreet Webb MD was also present during the appointment.  He discussed with the patient all the potential complications, risks, and benefits of their upcoming surgery.    As there were no other questions to be asked, she was given my business card along with Manpreet Webb'MD bishop business card if she has any questions or concerns prior to surgery or in the postop period.

## 2025-01-30 ENCOUNTER — ANESTHESIA EVENT (OUTPATIENT)
Dept: SURGERY | Facility: HOSPITAL | Age: 65
End: 2025-01-30
Payer: MEDICARE

## 2025-01-30 ENCOUNTER — TELEPHONE (OUTPATIENT)
Dept: SPORTS MEDICINE | Facility: CLINIC | Age: 65
End: 2025-01-30
Payer: MEDICARE

## 2025-01-31 ENCOUNTER — ANESTHESIA (OUTPATIENT)
Dept: SURGERY | Facility: HOSPITAL | Age: 65
End: 2025-01-31
Payer: MEDICARE

## 2025-01-31 ENCOUNTER — HOSPITAL ENCOUNTER (OUTPATIENT)
Facility: HOSPITAL | Age: 65
Discharge: HOME | End: 2025-01-31
Attending: STUDENT IN AN ORGANIZED HEALTH CARE EDUCATION/TRAINING PROGRAM | Admitting: STUDENT IN AN ORGANIZED HEALTH CARE EDUCATION/TRAINING PROGRAM
Payer: MEDICARE

## 2025-01-31 VITALS
BODY MASS INDEX: 29.95 KG/M2 | HEART RATE: 62 BPM | HEIGHT: 63 IN | TEMPERATURE: 98 F | WEIGHT: 169 LBS | RESPIRATION RATE: 15 BRPM | SYSTOLIC BLOOD PRESSURE: 127 MMHG | OXYGEN SATURATION: 99 % | DIASTOLIC BLOOD PRESSURE: 59 MMHG

## 2025-01-31 DIAGNOSIS — S46.012A TRAUMATIC COMPLETE TEAR OF LEFT ROTATOR CUFF, INITIAL ENCOUNTER: ICD-10-CM

## 2025-01-31 LAB
POCT GLUCOSE: 181 MG/DL (ref 70–110)
POCT GLUCOSE: 99 MG/DL (ref 70–110)

## 2025-01-31 PROCEDURE — 71000033 HC RECOVERY, INTIAL HOUR: Performed by: STUDENT IN AN ORGANIZED HEALTH CARE EDUCATION/TRAINING PROGRAM

## 2025-01-31 PROCEDURE — 25000003 PHARM REV CODE 250: Performed by: ORTHOPAEDIC SURGERY

## 2025-01-31 PROCEDURE — 25000003 PHARM REV CODE 250: Performed by: NURSE ANESTHETIST, CERTIFIED REGISTERED

## 2025-01-31 PROCEDURE — 82962 GLUCOSE BLOOD TEST: CPT | Mod: 91 | Performed by: STUDENT IN AN ORGANIZED HEALTH CARE EDUCATION/TRAINING PROGRAM

## 2025-01-31 PROCEDURE — 63600175 PHARM REV CODE 636 W HCPCS: Performed by: STUDENT IN AN ORGANIZED HEALTH CARE EDUCATION/TRAINING PROGRAM

## 2025-01-31 PROCEDURE — 94761 N-INVAS EAR/PLS OXIMETRY MLT: CPT

## 2025-01-31 PROCEDURE — D9220A PRA ANESTHESIA: Mod: CRNA,,, | Performed by: NURSE ANESTHETIST, CERTIFIED REGISTERED

## 2025-01-31 PROCEDURE — 37000009 HC ANESTHESIA EA ADD 15 MINS: Performed by: STUDENT IN AN ORGANIZED HEALTH CARE EDUCATION/TRAINING PROGRAM

## 2025-01-31 PROCEDURE — 36000710: Performed by: STUDENT IN AN ORGANIZED HEALTH CARE EDUCATION/TRAINING PROGRAM

## 2025-01-31 PROCEDURE — 29828 SHO ARTHRS SRG BICP TENODSIS: CPT | Mod: 51,HCNC,LT, | Performed by: STUDENT IN AN ORGANIZED HEALTH CARE EDUCATION/TRAINING PROGRAM

## 2025-01-31 PROCEDURE — 71000015 HC POSTOP RECOV 1ST HR: Performed by: STUDENT IN AN ORGANIZED HEALTH CARE EDUCATION/TRAINING PROGRAM

## 2025-01-31 PROCEDURE — 64416 NJX AA&/STRD BRCH PL NFS IMG: CPT | Performed by: ANESTHESIOLOGY

## 2025-01-31 PROCEDURE — 29826 SHO ARTHRS SRG DECOMPRESSION: CPT | Mod: HCNC,LT,, | Performed by: STUDENT IN AN ORGANIZED HEALTH CARE EDUCATION/TRAINING PROGRAM

## 2025-01-31 PROCEDURE — 99900035 HC TECH TIME PER 15 MIN (STAT)

## 2025-01-31 PROCEDURE — 63600175 PHARM REV CODE 636 W HCPCS: Performed by: ANESTHESIOLOGY

## 2025-01-31 PROCEDURE — 25000003 PHARM REV CODE 250: Performed by: ANESTHESIOLOGY

## 2025-01-31 PROCEDURE — D9220A PRA ANESTHESIA: Mod: ANES,,, | Performed by: ANESTHESIOLOGY

## 2025-01-31 PROCEDURE — 82962 GLUCOSE BLOOD TEST: CPT | Performed by: STUDENT IN AN ORGANIZED HEALTH CARE EDUCATION/TRAINING PROGRAM

## 2025-01-31 PROCEDURE — 36000711: Performed by: STUDENT IN AN ORGANIZED HEALTH CARE EDUCATION/TRAINING PROGRAM

## 2025-01-31 PROCEDURE — 37000008 HC ANESTHESIA 1ST 15 MINUTES: Performed by: STUDENT IN AN ORGANIZED HEALTH CARE EDUCATION/TRAINING PROGRAM

## 2025-01-31 PROCEDURE — 63600175 PHARM REV CODE 636 W HCPCS: Performed by: ORTHOPAEDIC SURGERY

## 2025-01-31 PROCEDURE — C1713 ANCHOR/SCREW BN/BN,TIS/BN: HCPCS | Performed by: STUDENT IN AN ORGANIZED HEALTH CARE EDUCATION/TRAINING PROGRAM

## 2025-01-31 PROCEDURE — 63600175 PHARM REV CODE 636 W HCPCS: Performed by: NURSE ANESTHETIST, CERTIFIED REGISTERED

## 2025-01-31 PROCEDURE — 27201423 OPTIME MED/SURG SUP & DEVICES STERILE SUPPLY: Performed by: STUDENT IN AN ORGANIZED HEALTH CARE EDUCATION/TRAINING PROGRAM

## 2025-01-31 PROCEDURE — 29827 SHO ARTHRS SRG RT8TR CUF RPR: CPT | Mod: HCNC,LT,, | Performed by: STUDENT IN AN ORGANIZED HEALTH CARE EDUCATION/TRAINING PROGRAM

## 2025-01-31 PROCEDURE — 71000016 HC POSTOP RECOV ADDL HR: Performed by: STUDENT IN AN ORGANIZED HEALTH CARE EDUCATION/TRAINING PROGRAM

## 2025-01-31 DEVICE — 4.75MM BC KNOTLESS SWIVELOCK W/ TAPE
Type: IMPLANTABLE DEVICE | Site: SHOULDER | Status: FUNCTIONAL
Brand: ARTHREX®

## 2025-01-31 DEVICE — SWIVELOCK, SP BC KL 4.75MM
Type: IMPLANTABLE DEVICE | Site: SHOULDER | Status: FUNCTIONAL
Brand: ARTHREX®

## 2025-01-31 DEVICE — LNT IMPLANT SYSTEM, 4.75 BC SWIVELOCK
Type: IMPLANTABLE DEVICE | Site: SHOULDER | Status: FUNCTIONAL
Brand: ARTHREX®

## 2025-01-31 RX ORDER — OXYCODONE HYDROCHLORIDE 5 MG/1
5 TABLET ORAL EVERY 4 HOURS PRN
Status: DISCONTINUED | OUTPATIENT
Start: 2025-01-31 | End: 2025-01-31 | Stop reason: HOSPADM

## 2025-01-31 RX ORDER — ACETAMINOPHEN 500 MG
1000 TABLET ORAL
Status: COMPLETED | OUTPATIENT
Start: 2025-01-31 | End: 2025-01-31

## 2025-01-31 RX ORDER — ONDANSETRON HYDROCHLORIDE 2 MG/ML
4 INJECTION, SOLUTION INTRAVENOUS ONCE AS NEEDED
Status: COMPLETED | OUTPATIENT
Start: 2025-01-31 | End: 2025-01-31

## 2025-01-31 RX ORDER — DEXAMETHASONE SODIUM PHOSPHATE 4 MG/ML
INJECTION, SOLUTION INTRA-ARTICULAR; INTRALESIONAL; INTRAMUSCULAR; INTRAVENOUS; SOFT TISSUE
Status: DISCONTINUED | OUTPATIENT
Start: 2025-01-31 | End: 2025-01-31

## 2025-01-31 RX ORDER — SODIUM CHLORIDE 9 MG/ML
INJECTION, SOLUTION INTRAVENOUS CONTINUOUS
Status: DISCONTINUED | OUTPATIENT
Start: 2025-01-31 | End: 2025-01-31 | Stop reason: HOSPADM

## 2025-01-31 RX ORDER — BUPIVACAINE HYDROCHLORIDE 2.5 MG/ML
INJECTION, SOLUTION EPIDURAL; INFILTRATION; INTRACAUDAL
Status: COMPLETED | OUTPATIENT
Start: 2025-01-31 | End: 2025-01-31

## 2025-01-31 RX ORDER — CEFAZOLIN 2 G/1
2 INJECTION, POWDER, FOR SOLUTION INTRAMUSCULAR; INTRAVENOUS
Status: DISCONTINUED | OUTPATIENT
Start: 2025-01-31 | End: 2025-01-31 | Stop reason: HOSPADM

## 2025-01-31 RX ORDER — SODIUM CHLORIDE 0.9 % (FLUSH) 0.9 %
3 SYRINGE (ML) INJECTION
Status: DISCONTINUED | OUTPATIENT
Start: 2025-01-31 | End: 2025-01-31 | Stop reason: HOSPADM

## 2025-01-31 RX ORDER — ONDANSETRON HYDROCHLORIDE 2 MG/ML
INJECTION, SOLUTION INTRAVENOUS
Status: DISCONTINUED | OUTPATIENT
Start: 2025-01-31 | End: 2025-01-31

## 2025-01-31 RX ORDER — FAMOTIDINE 10 MG/ML
INJECTION INTRAVENOUS
Status: DISCONTINUED | OUTPATIENT
Start: 2025-01-31 | End: 2025-01-31

## 2025-01-31 RX ORDER — NEOSTIGMINE METHYLSULFATE 0.5 MG/ML
INJECTION INTRAVENOUS
Status: DISCONTINUED | OUTPATIENT
Start: 2025-01-31 | End: 2025-01-31

## 2025-01-31 RX ORDER — EPINEPHRINE 1 MG/ML
INJECTION, SOLUTION, CONCENTRATE INTRAVENOUS
Status: DISCONTINUED | OUTPATIENT
Start: 2025-01-31 | End: 2025-01-31 | Stop reason: HOSPADM

## 2025-01-31 RX ORDER — PHENYLEPHRINE HYDROCHLORIDE 10 MG/ML
INJECTION INTRAVENOUS
Status: DISCONTINUED | OUTPATIENT
Start: 2025-01-31 | End: 2025-01-31

## 2025-01-31 RX ORDER — FENTANYL CITRATE 50 UG/ML
25-200 INJECTION, SOLUTION INTRAMUSCULAR; INTRAVENOUS
Status: DISCONTINUED | OUTPATIENT
Start: 2025-01-31 | End: 2025-01-31 | Stop reason: HOSPADM

## 2025-01-31 RX ORDER — FENTANYL CITRATE 50 UG/ML
INJECTION, SOLUTION INTRAMUSCULAR; INTRAVENOUS
Status: DISCONTINUED | OUTPATIENT
Start: 2025-01-31 | End: 2025-01-31

## 2025-01-31 RX ORDER — METHOCARBAMOL 500 MG/1
1000 TABLET, FILM COATED ORAL ONCE AS NEEDED
Status: DISCONTINUED | OUTPATIENT
Start: 2025-01-31 | End: 2025-01-31 | Stop reason: HOSPADM

## 2025-01-31 RX ORDER — CARBOXYMETHYLCELLULOSE SODIUM 10 MG/ML
GEL OPHTHALMIC
Status: DISCONTINUED | OUTPATIENT
Start: 2025-01-31 | End: 2025-01-31

## 2025-01-31 RX ORDER — KETAMINE HCL IN 0.9 % NACL 50 MG/5 ML
SYRINGE (ML) INTRAVENOUS
Status: DISCONTINUED | OUTPATIENT
Start: 2025-01-31 | End: 2025-01-31

## 2025-01-31 RX ORDER — HALOPERIDOL 5 MG/ML
0.5 INJECTION INTRAMUSCULAR EVERY 10 MIN PRN
Status: DISCONTINUED | OUTPATIENT
Start: 2025-01-31 | End: 2025-01-31 | Stop reason: HOSPADM

## 2025-01-31 RX ORDER — ROCURONIUM BROMIDE 10 MG/ML
INJECTION, SOLUTION INTRAVENOUS
Status: DISCONTINUED | OUTPATIENT
Start: 2025-01-31 | End: 2025-01-31

## 2025-01-31 RX ORDER — MIDAZOLAM HYDROCHLORIDE 1 MG/ML
.5-4 INJECTION, SOLUTION INTRAMUSCULAR; INTRAVENOUS
Status: DISCONTINUED | OUTPATIENT
Start: 2025-01-31 | End: 2025-01-31 | Stop reason: HOSPADM

## 2025-01-31 RX ORDER — PROPOFOL 10 MG/ML
VIAL (ML) INTRAVENOUS
Status: DISCONTINUED | OUTPATIENT
Start: 2025-01-31 | End: 2025-01-31

## 2025-01-31 RX ORDER — LIDOCAINE HYDROCHLORIDE 20 MG/ML
INJECTION INTRAVENOUS
Status: DISCONTINUED | OUTPATIENT
Start: 2025-01-31 | End: 2025-01-31

## 2025-01-31 RX ORDER — HYDROMORPHONE HYDROCHLORIDE 1 MG/ML
0.2 INJECTION, SOLUTION INTRAMUSCULAR; INTRAVENOUS; SUBCUTANEOUS EVERY 5 MIN PRN
Status: DISCONTINUED | OUTPATIENT
Start: 2025-01-31 | End: 2025-01-31 | Stop reason: HOSPADM

## 2025-01-31 RX ORDER — ROPIVACAINE HYDROCHLORIDE 2 MG/ML
INJECTION, SOLUTION EPIDURAL; INFILTRATION; PERINEURAL CONTINUOUS
Status: DISCONTINUED | OUTPATIENT
Start: 2025-01-31 | End: 2025-01-31 | Stop reason: HOSPADM

## 2025-01-31 RX ORDER — MIDAZOLAM HYDROCHLORIDE 1 MG/ML
INJECTION INTRAMUSCULAR; INTRAVENOUS
Status: DISCONTINUED | OUTPATIENT
Start: 2025-01-31 | End: 2025-01-31

## 2025-01-31 RX ORDER — CELECOXIB 200 MG/1
400 CAPSULE ORAL
Status: COMPLETED | OUTPATIENT
Start: 2025-01-31 | End: 2025-01-31

## 2025-01-31 RX ADMIN — FENTANYL CITRATE 50 MCG: 50 INJECTION INTRAMUSCULAR; INTRAVENOUS at 06:01

## 2025-01-31 RX ADMIN — Medication 10 MG: at 09:01

## 2025-01-31 RX ADMIN — ROCURONIUM BROMIDE 50 MG: 10 INJECTION, SOLUTION INTRAVENOUS at 08:01

## 2025-01-31 RX ADMIN — TRANEXAMIC ACID 1000 MG: 100 INJECTION INTRAVENOUS at 11:01

## 2025-01-31 RX ADMIN — HALOPERIDOL LACTATE 0.5 MG: 5 INJECTION, SOLUTION INTRAMUSCULAR at 12:01

## 2025-01-31 RX ADMIN — CELECOXIB 400 MG: 200 CAPSULE ORAL at 06:01

## 2025-01-31 RX ADMIN — Medication 10 MG: at 10:01

## 2025-01-31 RX ADMIN — CARBOXYMETHYLCELLULOSE SODIUM 4 DROP: 10 GEL OPHTHALMIC at 08:01

## 2025-01-31 RX ADMIN — MIDAZOLAM 1 MG: 1 INJECTION INTRAMUSCULAR; INTRAVENOUS at 06:01

## 2025-01-31 RX ADMIN — FENTANYL CITRATE 100 MCG: 50 INJECTION, SOLUTION INTRAMUSCULAR; INTRAVENOUS at 08:01

## 2025-01-31 RX ADMIN — NEOSTIGMINE METHYLSULFATE 5 MG: 0.5 INJECTION INTRAVENOUS at 11:01

## 2025-01-31 RX ADMIN — MIDAZOLAM HYDROCHLORIDE 2 MG: 2 INJECTION, SOLUTION INTRAMUSCULAR; INTRAVENOUS at 08:01

## 2025-01-31 RX ADMIN — PROPOFOL 150 MG: 10 INJECTION, EMULSION INTRAVENOUS at 08:01

## 2025-01-31 RX ADMIN — Medication: at 11:01

## 2025-01-31 RX ADMIN — GLYCOPYRROLATE 0.6 MG: 0.2 INJECTION, SOLUTION INTRAMUSCULAR; INTRAVENOUS at 11:01

## 2025-01-31 RX ADMIN — ONDANSETRON 4 MG: 2 INJECTION INTRAMUSCULAR; INTRAVENOUS at 12:01

## 2025-01-31 RX ADMIN — PHENYLEPHRINE HYDROCHLORIDE 100 MCG: 10 INJECTION INTRAVENOUS at 09:01

## 2025-01-31 RX ADMIN — LIDOCAINE HYDROCHLORIDE 100 MG: 20 INJECTION INTRAVENOUS at 08:01

## 2025-01-31 RX ADMIN — PHENYLEPHRINE HYDROCHLORIDE 100 MCG: 10 INJECTION INTRAVENOUS at 10:01

## 2025-01-31 RX ADMIN — TRANEXAMIC ACID 1000 MG: 100 INJECTION INTRAVENOUS at 09:01

## 2025-01-31 RX ADMIN — ACETAMINOPHEN 1000 MG: 500 TABLET ORAL at 06:01

## 2025-01-31 RX ADMIN — SODIUM CHLORIDE: 0.9 INJECTION, SOLUTION INTRAVENOUS at 06:01

## 2025-01-31 RX ADMIN — CEFAZOLIN 2 G: 2 INJECTION, POWDER, FOR SOLUTION INTRAMUSCULAR; INTRAVENOUS at 09:01

## 2025-01-31 RX ADMIN — DEXAMETHASONE SODIUM PHOSPHATE 8 MG: 4 INJECTION, SOLUTION INTRAMUSCULAR; INTRAVENOUS at 09:01

## 2025-01-31 RX ADMIN — FAMOTIDINE 20 MG: 10 INJECTION, SOLUTION INTRAVENOUS at 09:01

## 2025-01-31 RX ADMIN — BUPIVACAINE HYDROCHLORIDE 20 ML: 2.5 INJECTION, SOLUTION EPIDURAL; INFILTRATION; INTRACAUDAL; PERINEURAL at 07:01

## 2025-01-31 RX ADMIN — SODIUM CHLORIDE, SODIUM GLUCONATE, SODIUM ACETATE, POTASSIUM CHLORIDE, MAGNESIUM CHLORIDE, SODIUM PHOSPHATE, DIBASIC, AND POTASSIUM PHOSPHATE: .53; .5; .37; .037; .03; .012; .00082 INJECTION, SOLUTION INTRAVENOUS at 10:01

## 2025-01-31 RX ADMIN — Medication 30 MG: at 08:01

## 2025-01-31 RX ADMIN — ONDANSETRON 4 MG: 2 INJECTION INTRAMUSCULAR; INTRAVENOUS at 10:01

## 2025-01-31 NOTE — ANESTHESIA PREPROCEDURE EVALUATION
01/31/2025  Pre-operative evaluation for Procedure(s) (LRB):  ARTHROSCOPY SHOULDER REPAIR, ROTATOR CUFF (Left)  ARTHROSCOPY,SHOULDER,WITH BICEPS TENODESIS (Left)  ARTHROSCOPY, SHOULDER, WITH PARTIAL SYNOVECTOMY (Left)    Mariam Bran is a 64 y.o. female     Patient Active Problem List   Diagnosis    History of acute gallstone pancreatitis    Elevated troponin    Essential hypertension    Recurrent Clostridioides difficile diarrhea    Anxiety disorder    Tobacco abuse    S/P laparoscopic cholecystectomy 6-6-2021    Hypomagnesemia    Pulmonary nodule, right    Vitamin D deficiency    C. difficile colitis    Anemia    Graves disease    Leukocytosis    Hypotension    Hypokalemia    Type 2 diabetes mellitus without complication, without long-term current use of insulin    Blood in stool    Polyp of colon       Review of patient's allergies indicates:  No Known Allergies    No current facility-administered medications on file prior to encounter.     Current Outpatient Medications on File Prior to Encounter   Medication Sig Dispense Refill    ALPRAZolam (XANAX) 1 MG tablet Take 1 tablet (1 mg total) by mouth 2 (two) times daily as needed (muscle spasms).      amitriptyline (ELAVIL) 150 MG Tab Take 150 mg by mouth every evening.       amLODIPine (NORVASC) 10 MG tablet Take 1 tablet (10 mg total) by mouth once daily. 30 tablet 2    atorvastatin (LIPITOR) 40 MG tablet Take 40 mg by mouth once daily.      hydroCHLOROthiazide (HYDRODIURIL) 50 MG tablet Take 50 mg by mouth.      HYDROcodone-acetaminophen (NORCO)  mg per tablet Take 1 tablet by mouth 4 (four) times daily as needed for Pain.      methIMAzole (TAPAZOLE) 5 MG Tab Take half a 5 mg tablet a day (2.5 mg dose). 45 tablet 1    paroxetine (PAXIL) 40 MG tablet Take 40 mg by mouth once daily.       blood sugar diagnostic Strp To check BG 3 times daily, to use  with insurance preferred meter 100 each 0    blood-glucose meter kit To check BG 3 times daily, to use with insurance preferred meter (Patient not taking: Reported on 6/12/2024) 1 each 0    cetirizine (ZYRTEC) 10 MG tablet Take 10 mg by mouth daily as needed.      glimepiride (AMARYL) 2 MG tablet Take 1 tablet (2 mg total) by mouth before breakfast. (Patient not taking: Reported on 6/12/2024) 90 tablet 3    JARDIANCE 25 mg tablet Take 25 mg by mouth.      lancets Misc To check BG 3 times daily, to use with insurance preferred meter 100 each 0    lisinopriL 10 MG tablet TAKE 1 TABLET BY MOUTH ONCE DAILY 30 tablet 1    ondansetron (ZOFRAN-ODT) 4 MG TbDL Take 1 tablet (4 mg total) by mouth every 6 (six) hours as needed (nausea/vomiting). 12 tablet 0       Past Surgical History:   Procedure Laterality Date    COLONOSCOPY      COLONOSCOPY N/A 6/20/2024    Procedure: COLONOSCOPY;  Surgeon: Kolby Yao MD;  Location: [facility];  Service: Endoscopy;  Laterality: N/A;    LAPAROSCOPIC CHOLECYSTECTOMY N/A 06/06/2021    Procedure: CHOLECYSTECTOMY, LAPAROSCOPIC;  Surgeon: Sandy Delaney MD;  Location: [address];  Service: General;  Laterality: N/A;       Social History     Socioeconomic History    Marital status:    Tobacco Use    Smoking status: Every Day     Types: Cigarettes    Smokeless tobacco: Never    Tobacco comments:     quit 5/2021   Substance and Sexual Activity    Alcohol use: Never    Drug use: Never         EKG:  Normal sinus rhythm   Nonspecific T wave abnormality   Abnormal ECG       2D Echo:  No results found for this or any previous visit.        Pre-op Assessment    I have reviewed the Patient Summary Reports.     I have reviewed the Nursing Notes. I have reviewed the NPO Status.   I have reviewed the Medications.     Review of Systems  Anesthesia Hx:             Denies Family Hx of Anesthesia complications.    Denies Personal Hx of Anesthesia complications.                     Cardiovascular:  Exercise tolerance: good   Hypertension     Denies Dysrhythmias.   Denies Angina.       Denies DRAKE.                              Pulmonary:    Denies COPD.  Denies Asthma.                    Renal/:   Denies Chronic Renal Disease.                Hepatic/GI:      Denies GERD.                Neurological:    Denies CVA.    Denies Seizures.                                Endocrine:  Diabetes  Hyperthyroidism         Psych:  Psychiatric History                  Physical Exam  General: Well nourished, Cooperative, Alert and Oriented    Airway:  Mallampati: II / I  Mouth Opening: Normal  TM Distance: Normal  Tongue: Normal  Neck ROM: Normal ROM    Dental:  Intact    Chest/Lungs:  Clear to auscultation, Normal Respiratory Rate    Heart:  Rate: Normal  Rhythm: Regular Rhythm        Anesthesia Plan  Type of Anesthesia, risks & benefits discussed:    Anesthesia Type: Gen ETT, Regional  Intra-op Monitoring Plan: Standard ASA Monitors  Post Op Pain Control Plan: multimodal analgesia  Induction:  IV  Airway Plan: Direct, Post-Induction  Informed Consent: Informed consent signed with the Patient and all parties understand the risks and agree with anesthesia plan.  All questions answered. Patient consented to blood products? Yes  ASA Score: 2  Day of Surgery Review of History & Physical: H&P Update referred to the surgeon/provider.    Ready For Surgery From Anesthesia Perspective.     .

## 2025-01-31 NOTE — PLAN OF CARE
Pre Op complete with no distress noted.  Call light in reach and no questions at this time. Belongings locked in locker.

## 2025-01-31 NOTE — TRANSFER OF CARE
"Anesthesia Transfer of Care Note    Patient: Mariam Bran    Procedure(s) Performed: Procedure(s) (LRB):  ARTHROSCOPY SHOULDER REPAIR, ROTATOR CUFF (Left)  ARTHROSCOPY,SHOULDER,WITH BICEPS TENODESIS (Left)  DEBRIDEMENT, SHOULDER, ARTHROSCOPIC (Left)  ARTHROSCOPY, SHOULDER, WITH SUBACROMIAL SPACE DECOMPRESSION (Left)    Patient location: PACU    Anesthesia Type: general    Transport from OR: Transported from OR on 6-10 L/min O2 by face mask with adequate spontaneous ventilation    Post pain: adequate analgesia    Post assessment: no apparent anesthetic complications and tolerated procedure well    Post vital signs: stable    Level of consciousness: awake, alert and oriented    Nausea/Vomiting: no nausea/vomiting    Complications: none    Transfer of care protocol was followed      Last vitals: Visit Vitals  BP (!) 127/59   Pulse 62   Temp 36.8 °C (98.2 °F) (Oral)   Resp 15   Ht 5' 3" (1.6 m)   Wt 76.7 kg (169 lb)   SpO2 99%   Breastfeeding No   BMI 29.94 kg/m²     "

## 2025-01-31 NOTE — ANESTHESIA PROCEDURE NOTES
Interscalene Continuous Nerve Block    Patient location during procedure: pre-op   Block not for primary anesthetic.  Reason for block: at surgeon's request and post-op pain management   Post-op Pain Location: L shoulder pain   Start time: 1/31/2025 6:59 AM  Timeout: 1/31/2025 6:58 AM   End time: 1/31/2025 7:03 AM    Staffing  Authorizing Provider: Shade Page MD  Performing Provider: Shade Page MD    Staffing  Performed by: Shade Page MD  Authorized by: Shade Page MD    Preanesthetic Checklist  Completed: patient identified, IV checked, site marked, risks and benefits discussed, surgical consent, monitors and equipment checked, pre-op evaluation and timeout performed  Peripheral Block  Patient position: sitting  Prep: ChloraPrep and site prepped and draped  Patient monitoring: heart rate, cardiac monitor, continuous pulse ox, continuous capnometry and frequent blood pressure checks  Block type: interscalene  Laterality: left  Injection technique: continuous  Needle  Needle type: Tuohy   Needle gauge: 17 G  Needle length: 3.5 in  Needle localization: anatomical landmarks and ultrasound guidance  Catheter type: non-stimulating  Catheter size: 20 G  Test dose: lidocaine 1.5% with Epi 1-to-200,000 and negative   -ultrasound image captured on disc.  Assessment  Injection assessment: negative aspiration, negative parasthesia and local visualized surrounding nerve  Paresthesia pain: none  Heart rate change: no  Slow fractionated injection: yes  Pain Tolerance: comfortable throughout block and no complaints  Medications:    Medications: bupivacaine (pf) (MARCAINE) injection 0.25% - Perineural   20 mL - 1/31/2025 7:03:00 AM    Additional Notes  VSS.  [facility] RN monitoring vitals throughout procedure.  Patient tolerated procedure well.

## 2025-01-31 NOTE — BRIEF OP NOTE
[place] - Surgery ([facility])  Brief Operative Note    Surgery Date: 1/31/2025     Surgeons and Role:     * Manpreet Webb MD - Primary    Assisting Surgeon: None    Pre-op Diagnosis:  Traumatic complete tear of left rotator cuff, initial encounter [S46.012A]  Biceps tendinitis of left upper extremity [M75.22]  Arthritis of shoulder region, left [M19.012]  Synovitis of left shoulder [M65.912]    Post-op Diagnosis:  Post-Op Diagnosis Codes:     * Traumatic complete tear of left rotator cuff, initial encounter [S46.012A]     * Biceps tendinitis of left upper extremity [M75.22]     * Arthritis of shoulder region, left [M19.012]     * Synovitis of left shoulder [M65.912]    Procedure(s) (LRB):  ARTHROSCOPY SHOULDER REPAIR, ROTATOR CUFF (Left)  ARTHROSCOPY,SHOULDER,WITH BICEPS TENODESIS (Left)  DEBRIDEMENT, SHOULDER, ARTHROSCOPIC (Left)  ARTHROSCOPY, SHOULDER, WITH SUBACROMIAL SPACE DECOMPRESSION (Left)    Anesthesia: Regional    Operative Findings: tear of rotator cuff     Estimated Blood Loss: * No values recorded between 1/31/2025  9:34 AM and 1/31/2025 11:18 AM *         Specimens:   Specimen (24h ago, onward)      None              Discharge Note    OUTCOME: Patient tolerated treatment/procedure well without complication and is now ready for discharge.    DISPOSITION: Home or Self Care    FINAL DIAGNOSIS:  <principal problem not specified>    FOLLOWUP: In clinic    DISCHARGE INSTRUCTIONS:  No discharge procedures on file.

## 2025-01-31 NOTE — OP NOTE
DATE OF PROCEDURE: 01/31/2025    SURGEON: Manpreet Webb MD    ASSISTANT:   Ted GALLO     PREOPERATIVE DIAGNOSIS:    Left traumatic rotator cuff tear   Left biceps tendinitis    POSTOPERATIVE DIAGNOSIS:   Left traumatic rotator cuff tear   Left biceps tendinitis  Left shoulder synovitis   Left shoulder subacromial impingement with bursitis   Left shoulder degenerative fraying of the glenoid labrum    PROCEDURE PERFORMED:   Left shoulder arthroscopic rotator cuff repair   Left shoulder arthroscopic biceps tenodesis   Left shoulder arthroscopic synovectomy   Left shoulder arthroscopic extensive debridement of the anterior, superior, posterior labrum   Left shoulder arthroscopic subacromial decompression      ANESTHESIA: General with interscalene block and catheter    BLOOD LOSS: Minimal.     IMPLANTS:  Implant Name Type Inv. Item Serial No.  Lot No. LRB No. Used Action   SYS SWIVELOCK LNT 4.75BC - [id number]  SYS SWIVELOCK LNT 4.75BC  [employer]  Left 1 Implanted   ANCHOR SWIVELCK KL 4.75X24.5MM - [id number]  ANCHOR SWIVELCK KL 4.75X24.5MM  [employer] [id number] Left 1 Implanted   ANCHOR SWIVELCK KL 4.75X24.5MM - [id number]  ANCHOR SWIVELCK KL 4.75X24.5MM  [employer] [id number] Left 1 Implanted   ANCHOR SUTURE SWIVILOK 19.1MM - [id number]  ANCHOR SUTURE SWIVILOK 19.1MM  [employer]  Left 1 Implanted   BIOCOMPOSITE KNOTLESS SWIVELOCK ANCHOR     [id number] Left 1 Implanted         DRAINS: None.     COMPLICATIONS: None.     INTRAOPERATIVE FINDINGS:  Exam under anesthesia revealed range of motion of  170 forward elevation, 50 external rotation at 0°, at 90° there was 90 of external rotation and 70 of internal rotation.  Diagnostic arthroscopy revealed intact cartilage and glenohumeral head.  Rotator cuff tear.  Biceps tendinitis.  Circumferential degenerative tearing of the glenoid labrum.  Subacromial impingement with bursitis.  He is synovitis throughout the shoulder.    BRIEF INDICATION OF  MEDICAL NECESSITY:   Mariam Bran is a 64 y.o. female is well known to our clinic with a long history of left shoulder pain.  She sustained a fall that resulted in acute onset shoulder pain.  Imaging, including radiographs and advanced imaging techniques of MRI, or performed which show rotator cuff tear.  she has had an extensive course of conservative treatment which have failed to relieved there pain.  After discussion with the patient it was determined the best course of action would be to proceed to the operating room for shoulder arthroscopy.  The potential procedures, as well as the risks benefits and alternatives, were explained in great detail to the patient.  All questions were answered and informed consent was obtained.      PROCEDURE IN DETAIL:   The patient was identified in the preoperative holding area and the site was marked.   a block was administered by the anesthesia team and she was taken to the operating room where there placed in the supine position on the operating room table.  General anesthetic agents were then administered.  An exam under anesthesia was performed, see detailed findings above.  The patient was then placed in the beach chair position and all bony prominences were well padded.  Time-out was undertaken to confirm the patient, site, surgery, surgeon, and administration preoperative antibiotics.  All was agreed upon and we proceeded.    Bony landmarks were drawn and an 18 gauge spinal needle was inserted into the glenohumeral joint from the standard posterior arthroscopic portal location.  The joint was insufflated with saline solution.  The spinal needle was then removed and a knife was used to create our incision and the blunt trocar was placed within the glenohumeral joint.  A diagnostic arthroscopy was performed, see the detailed findings above.  A standard anterior arthroscopic portal was established under spinal needle guidance.  A knife was used for skin incision and a  cannula was placed.    The frayed edges of the labrum were debrided using a shaver.  Edges of the subscapularis tendon were dissected using a combination of shaver and electrocautery, and the subscapularis tendon was thoroughly examined and appeared tearing of the upper border.    There was noted to be extensive synovitis throughout the shoulder in his synovectomy was performed using arthroscopic shaver and radiofrequency ablator.      The decision was made to perform a biceps tenodesis.  This was done via a loop intact fashion.  A link suture was passed circumferentially around the lateral aspect of the long head of the biceps tendon, just medial bicipital groove.  This was cinched down.  The free end of the link was then passed through the substance of the long head of the biceps tendon just medial to the cinch.  The long head of the biceps was then taken off of the attachment to the superior glenoid rim using combination of arthroscopic scissors and radiofrequency ablator.  The stump of the long of the biceps was then removed using a radiofrequency ablator.  This link was placed out of the way for placement in at SwiveLock anchor with a subscapularis repair.    The subscapularis was noted to be torn and in need of repair.  Tendon was dissected out proximally and freed of all adhesions along the anterior and posterior aspect of the tendon.   2 link sutures were placed through the subscapularis tendon in a cinch type fashion.  These were loaded into a 4.75 mm SwiveLock anchor along with the link suture from the biceps tenodesis.  The lesser tuberosity of the subscapularis attachment was prepared using combination of radiofrequency ablator and arthroscopic jess.  A punch was used to create a guide hole in the anchor was placed.  The link sutures from the subscapularis repair and the biceps tenodesis were appropriately tensioned the anchor was advanced with good purchase.  The suture limbs were cut and images of the  repair and tenodesis were then taken.    The suspected area of rotator cuff pathology was then tagged using a spinal needle and PDS suture.    The instruments removed from the glenohumeral joint, and a blunt trocar was introduced in the subacromial space.  Using the shaver through the anterior portal, the initial part of the bursectomy was performed.  A standard lateral oral arthroscopic portal was established under spinal needle guidance over the suspected area of rotator cuff disease.  Cannula was then placed through the lateral portal and a combination of shaver and electrocautery was used to perform a subacromial bursectomy.  Hemostasis was achieved with electrocautery.      We then identified our rotator cuff tear.  The edges of the tear were dissected out using combination of radiofrequency ablator and arthroscopic shaver.  Once our tear was outlined we repaired our footprint.  This was done using combination of the radiofrequency ablator, arthroscopic shaver, arthroscopic bur, and power rasp.  A grasper was inserted through the lateral cannula and we ensured that the rotator cuff tendons were mobilized to the lateral aspect of the footprint, which they were.  We decided that a 2 x 2 double row repair would best fit this tear.  We began by placing 2 medial anchors.  This was done under spinal needle guidance.  With the arm in neutral rotation a spinal ease inserted just off the anterior lateral aspect of the acromion an appropriate trajectory for the medial row anchors.  Using a punch, we punched our hole for the anterior medial row anchor at the anterior aspect of the tear just off the junction of the articular cartilage of the rotator cuff footprint.  The anchor was then placed with excellent purchase.  We then repeated this process with the posterior medial row anchor at the posterior aspect of the tear.  We then began to pass our sutures, this was done using 4 separate passes.  The 1st pass was done using  a link suture to the most anterior aspect of the tear.  This leak was used to shuttle 1 of the FiberTapes as well as the knotless mechanism sutures from the anterior medial row anchor.  The 2nd pass was done with a 2nd tape from the anterior medial row anchor and placed just posterior to the 1st pass.  The 3rd pass was a single tape from the posterior medial row anchor and this was placed posterior to the 2nd pass.  The 4th and final pass was at the most posterior aspect of the rotator cuff tear.  This was done using a link suture to shuttle the final tape from the posterior medial row anchor as well as the knotless mechanism sutures from this anchor.  Once this was done we were ready for a lateral well anchors.  First, a link sutures placed in a cinch type fashion along at the anterior aspect of the rotator cuff cable.  This along with the tapes from passes 1 and 3 were retrieved out the lateral cannula and loaded into a SwiveLock anchor.  A punch was used to create a guide hole in the anterior lateral aspect of the rotator cuff footprint and the anchor was then advanced.  The sutures were tensioned prior to final advancement of the anchor.  This nicely reduced the rotator cuff over the footprint.  The suture limbs were cut.  We then used a 2nd link suture in a cinch type fashion through the posterior rotator cuff cable.  This, along with the tapes from passes 2 and 4 were retrieved out of the lateral cannula and placed in a SwiveLock anchor.  A punch was used in the posterior lateral aspect of the rotator cuff footprint to create a guide hole.  The anchor was then placed and the sutures were appropriately tensioned.  The anchor was then advanced until it had good fixation.  The suture limbs were then cut.  We then proceeded with our medial row compression.  The repair suture from the anterior medial row anchor was then shuttled through the knotless mechanism of the posterior medial row anchor.  The repair suture  from the posterior medial row anchor was then shuttled the knotless mechanism of the anterior medial row anchor.  This provided compression on top of our repair of the rotator cuff down to the footprint.  At this point the rotator cuff repair was complete final images of the repair were then taken.    There were noted to be several spurs on the undersurface of the acromion, and a shaver was used to perform a subacromial decompression.  Final images the decompression were taken once we had a smooth undersurface.      At this point the procedure was complete and all instruments were removed.  The wounds were closed with nylon suture.  They were covered with sterile dressings.  Sling was then placed and the patient was returned to the supine position and awakened from the anesthetic agents.  The procedure was complete without complication and tolerated well by the patient.  Was then taken to the postanesthesia care unit in stable condition    POSTOPERATIVE PLAN:  She will follow the arthroscopic rotator cuff repair protocol.  Sling and abduction pillow at all times.  Return to clinic in 2 weeks.

## 2025-01-31 NOTE — ANESTHESIA PROCEDURE NOTES
Intubation    Date/Time: 1/31/2025 8:59 AM    Performed by: Jean Marie Osborne CRNA  Authorized by: Shade Page MD    Intubation:     Induction:  Intravenous    Intubated:  Postinduction    Mask Ventilation:  Easy mask    Attempts:  1    Attempted By:  CRNA    Method of Intubation:  Video laryngoscopy    Blade:  Estrada 3    Laryngeal View Grade: Grade I - full view of cords      Difficult Airway Encountered?: No      Complications:  None    Airway Device:  Oral endotracheal tube    Airway Device Size:  7.5    Style/Cuff Inflation:  Cuffed    Inflation Amount (mL):  6    Tube secured:  21    Secured at:  The lips    Placement Verified By:  Capnometry    Complicating Factors:  None    Findings Post-Intubation:  BS equal bilateral and atraumatic/condition of teeth unchanged

## 2025-02-03 NOTE — ANESTHESIA POSTPROCEDURE EVALUATION
Anesthesia Post Evaluation    Patient: Mariam Bran    Procedure(s) Performed: Procedure(s) (LRB):  ARTHROSCOPY SHOULDER REPAIR, ROTATOR CUFF (Left)  ARTHROSCOPY,SHOULDER,WITH BICEPS TENODESIS (Left)  DEBRIDEMENT, SHOULDER, ARTHROSCOPIC (Left)  ARTHROSCOPY, SHOULDER, WITH SUBACROMIAL SPACE DECOMPRESSION (Left)    Final Anesthesia Type: general      Patient location during evaluation: PACU  Patient participation: Yes- Able to Participate  Level of consciousness: awake and alert and oriented  Post-procedure vital signs: reviewed and stable  Pain management: adequate  Airway patency: patent    PONV status at discharge: No PONV  Anesthetic complications: no      Cardiovascular status: blood pressure returned to baseline and hemodynamically stable  Respiratory status: unassisted, room air and spontaneous ventilation  Hydration status: euvolemic  Follow-up not needed.              Vitals Value Taken Time   /62 01/31/25 1318   Temp  02/03/25 1248   Pulse 60 01/31/25 1330   Resp 23 01/31/25 1330   SpO2 91 % 01/31/25 1329   Vitals shown include unfiled device data.      Event Time   Out of Recovery 12:12:00         Pain/Matt Score: No data recorded

## 2025-02-14 ENCOUNTER — CLINICAL SUPPORT (OUTPATIENT)
Dept: REHABILITATION | Facility: HOSPITAL | Age: 65
End: 2025-02-14
Payer: MEDICARE

## 2025-02-14 ENCOUNTER — OFFICE VISIT (OUTPATIENT)
Dept: SPORTS MEDICINE | Facility: CLINIC | Age: 65
End: 2025-02-14
Payer: MEDICARE

## 2025-02-14 VITALS
HEIGHT: 63 IN | BODY MASS INDEX: 30.08 KG/M2 | HEART RATE: 79 BPM | WEIGHT: 169.75 LBS | SYSTOLIC BLOOD PRESSURE: 142 MMHG | DIASTOLIC BLOOD PRESSURE: 62 MMHG

## 2025-02-14 DIAGNOSIS — S46.012A TRAUMATIC COMPLETE TEAR OF LEFT ROTATOR CUFF, INITIAL ENCOUNTER: ICD-10-CM

## 2025-02-14 DIAGNOSIS — Z98.890 S/P ROTATOR CUFF REPAIR: Primary | ICD-10-CM

## 2025-02-14 DIAGNOSIS — M25.512 ACUTE PAIN OF LEFT SHOULDER: Primary | ICD-10-CM

## 2025-02-14 PROCEDURE — 97161 PT EVAL LOW COMPLEX 20 MIN: CPT | Mod: HCNC

## 2025-02-14 PROCEDURE — 97110 THERAPEUTIC EXERCISES: CPT | Mod: HCNC

## 2025-02-14 PROCEDURE — 99999 PR PBB SHADOW E&M-EST. PATIENT-LVL III: CPT | Mod: PBBFAC,HCNC,, | Performed by: PHYSICIAN ASSISTANT

## 2025-02-14 RX ORDER — METHOCARBAMOL 500 MG/1
500 TABLET, FILM COATED ORAL 3 TIMES DAILY PRN
Qty: 30 TABLET | Refills: 0 | Status: SHIPPED | OUTPATIENT
Start: 2025-02-14

## 2025-02-14 NOTE — PROGRESS NOTES
Outpatient Rehab    Physical Therapy Evaluation    Patient Name: Mariam Bran  MRN: [id number]  YOB: 1960  Today's Date: 2/14/2025    Therapy Diagnosis:   Encounter Diagnoses   Name Primary?    Traumatic complete tear of left rotator cuff, initial encounter     Acute pain of left shoulder Yes     Physician: Colby Navarro*    Physician Orders: Eval and Treat  Medical Diagnosis: S46.012A (ICD-10-CM) - Traumatic complete tear of left rotator cuff, initial encounter     Visit # / Visits Authorized:  1 / 20   Date of Evaluation:  2/14/2025   Insurance Authorization Period: 2/14/2025   to 12/31/25  Plan of Care Certification:  2/14/2025 to 8/1/25      Time In:   100  Time Out:  145  Total Time:   45  Total Billable Time: 45    Intake Outcome Measure for FOTO Survey    Therapist reviewed FOTO scores for Mariam Bran on 2/14/2025.   FOTO report - see Media section or FOTO account episode details.     Intake Score: 20%    Precautions       POSTOPERATIVE PLAN:  She will follow the arthroscopic rotator cuff repair protocol.  Sling and abduction pillow at all times.  Return to clinic in 2 weeks    Subjective   History of Present Illness  Mariam is a 64 y.o. female who reports to physical therapy with a chief concern of L shoulder pain. According to the patient's chart, Mariam has a past medical history of Acute colitis, Graves disease, Hyperthyroidism, and S/P laparoscopic cholecystectomy 6-6-2021. Mariam has a past surgical history that includes Laparoscopic cholecystectomy (N/A, 06/06/2021); Colonoscopy; Colonoscopy (N/A, 6/20/2024); Rotator cuff repair (Left, 1/31/2025); arthroscopy,shoulder,with biceps tenodesis (Left, 1/31/2025); Arthroscopic debridement of shoulder (Left, 1/31/2025); and Arthroscopy of shoulder with decompression of subacromial space (Left, 1/31/2025).        Diagnostic tests related to this condition: MRI studies.        History of Present Condition/Illness: Pt reports a history of L  shoulder pain and it got worse when she tripped over her dog. She waited about 4 mos before she was seen by ortho and had a MRI which showed rotator cuff tear. She is now 2 weeks s/p L RCR and referred to PT to improve LUE per protocol.     Activities of Daily Living  Social history was obtained from Patient.               Previously independent with activities of daily living? Yes     Currently independent with activities of daily living? No              Pain     Patient reports a current pain level of 8/10. Pain at best is reported as 6/10. Pain at worst is reported as 10/10.   Clinical Progression (since onset): Stable  Pain Qualities: Aching  Pain-Relieving Factors: Medications - prescription, Ice  Pain-Aggravating Factors: Sleeping           Past Medical History/Physical Systems Review:   Mariam Bran  has a past medical history of Acute colitis, Graves disease, Hyperthyroidism, and S/P laparoscopic cholecystectomy 6-6-2021.    Mariam Bran  has a past surgical history that includes Laparoscopic cholecystectomy (N/A, 06/06/2021); Colonoscopy; Colonoscopy (N/A, 6/20/2024); Rotator cuff repair (Left, 1/31/2025); arthroscopy,shoulder,with biceps tenodesis (Left, 1/31/2025); Arthroscopic debridement of shoulder (Left, 1/31/2025); and Arthroscopy of shoulder with decompression of subacromial space (Left, 1/31/2025).    Mariam has a current medication list which includes the following prescription(s): alprazolam, amitriptyline, amlodipine, aspirin, atorvastatin, blood sugar diagnostic, celecoxib, hydrochlorothiazide, hydrocodone-acetaminophen, jardiance, lancets, lisinopril, methimazole, methocarbamol, ondansetron, oxycodone, paroxetine, and promethazine.    Review of patient's allergies indicates:  No Known Allergies     Objective   Posture        Shoulders are Rounded and Elevated.             Upper Extremity Sensation  General Upper Extremity Sensation  Intact: Right and Left  Right Upper Extremity  Sensation  Intact: Light Touch, Sharp/Dull Discrimination, Kinesthesia, Proprioception, and Hot/Cold Discrimination  Right Upper Extremity Sensation Stocking Glove Pattern: No    Left Upper Extremity Sensation  Intact: Light Touch, Sharp/Dull Discrimination, Kinesthesia, Proprioception, and Hot/Cold Discrimination  Left Upper Extremity Sensation Stocking Glove Pattern: No             Shoulder Range of Motion  Right Shoulder   Active (deg) Passive (deg) Pain   Flexion   160     Extension         Scaption         ABduction   170     ADduction         Horizontal ABduction         Horizontal ADduction         External Rotation (Shoulder ABducted 0 degrees)         External Rotation (Shoulder ABducted 45 degrees)         External Rotation (Shoulder ABducted 90 degrees)   90     Internal Rotation (Shoulder ABducted 0 degrees)         Internal Rotation (Shoulder ABducted 45 degrees)         Internal Rotation (Shoulder ABducted 90 degrees)   65       Left Shoulder   Active (deg) Passive (deg) Pain   Flexion         Extension         Scaption         ABduction   80     ADduction         Horizontal ABduction         Horizontal ADduction         External Rotation (Shoulder ABducted 0 degrees)   0     External Rotation (Shoulder ABducted 45 degrees)         External Rotation (Shoulder ABducted 90 degrees)         Internal Rotation (Shoulder ABducted 0 degrees)   70     Internal Rotation (Shoulder ABducted 45 degrees)         Internal Rotation (Shoulder ABducted 90 degrees)                       Shoulder Strength - Planes of Motion   Right Strength Right Pain Left Strength Left  Pain   Flexion 5         Extension           ABduction           ADduction 4+         Horizontal ABduction           Horizontal ADduction           Internal Rotation 0° 4         Internal Rotation 90°           External Rotation 0° 4         External Rotation 90°                            Treatment:  Therapeutic Exercise  Therapeutic Exercise Activity  1: FOTO  Therapeutic Exercise Activity 2: Pt education and HEP review  Therapeutic Exercise Activity 3: scpaular retractions 10 sec x10  Therapeutic Exercise Activity 4: pendulum hang x1 min (with hand open close and hand pronation/supination)    Assessment & Plan   Assessment  Mariam presents with a condition of Moderate complexity.   Presentation of Symptoms: Stable            Patient Goal for Therapy (PT): Decrease LUE pain  Prognosis: Good  Prognosis Details: Pt presents with decreased L shoulder ROM, strength, postural deficits and pain secondary to recent surgery and limiting ADLs.    Plan  From a physical therapy perspective, the patient would benefit from: Skilled Rehab Services    Planned therapy interventions include: Neuromuscular re-education, Therapeutic exercise, Therapeutic activities, and Manual therapy.            Visit Frequency: 2 times Per Week for 24 Weeks.       This plan was discussed with Patient.   Discussion participants: Agreed Upon Plan of Care             Patient's spiritual, cultural, and educational needs considered and patient agreeable to plan of care and goals.     Education  Education was done with Patient. The patient's learning style includes Demonstration. The patient Demonstrates understanding.                 Goals:   Active       Functional outcome       Patient will demonstrate independence in home program for support of progression       Start:  02/14/25    Expected End:  08/01/25               Pain       Patient will report pain of 4/10 demonstrating a reduction of overall pain       Start:  02/14/25    Expected End:  08/01/25               Range of Motion       Patient will achieve left shoulder flexion of 180 degrees       Start:  02/14/25    Expected End:  08/01/25            Patient will achieve left shoulder abduction of 180 degrees       Start:  02/14/25    Expected End:  08/01/25            Patient will achieve left shoulder external rotation of 80 degrees in 90  degrees abd       Start:  02/14/25    Expected End:  08/01/25            Patient will achieve left shoulder internal rotation of 80 degrees in 90 degrees abd       Start:  02/14/25    Expected End:  08/01/25               Strength       Patient will achieve left shoulder flexion strength of 5/5       Start:  02/14/25    Expected End:  08/01/25            Patient will achieve left shoulder abduction strength of 5/5       Start:  02/14/25    Expected End:  08/01/25            Patient will achieve left shoulder internal rotation strength of 5/5 in neutral       Start:  02/14/25    Expected End:  08/01/25               Strength       Patient will achieve left shoulder external rotation strength of 5/5 in neutral       Start:  02/14/25    Expected End:  08/01/25                Lissette Byrd, PT

## 2025-02-14 NOTE — PROGRESS NOTES
"POST-OPERATIVE EXAMINATION    64 y.o. Female who returns for follow after surgery. She is 2 weeks s/p:    PROCEDURE PERFORMED (Jon):   Left shoulder arthroscopic rotator cuff repair   Left shoulder arthroscopic biceps tenodesis   Left shoulder arthroscopic synovectomy   Left shoulder arthroscopic extensive debridement of the anterior, superior, posterior labrum   Left shoulder arthroscopic subacromial decompression    She is doing well without any issues.       PHYSICAL EXAMINATION:  BP (!) 142/62 (BP Location: Right arm, Patient Position: Sitting)   Pulse 79   Ht 5' 3" (1.6 m)   Wt 77 kg (169 lb 12.1 oz)   BMI 30.07 kg/m²   General: Well-developed well-nourished 64 y.o. female in no acute distress   Cardiovascular: Regular rhythm   Lungs: No labored breathing or wheezing appreciated   Neuro: Alert and oriented ×3   Psychiatric: well oriented to person, place and time, demonstrates normal mood and affect   Skin: No rashes, lesions or ulcers, normal temperature, turgor, and texture on involved extremity    ORTHOPEDIC EXAM:  Normal post-operative swelling  Normal post-operative scarring  Strength: WNL  ROM: Not tested  Tests: none today    ASSESSMENT:      ICD-10-CM ICD-9-CM   1. S/P rotator cuff repair  Z98.890 V45.89           PLAN:       Sutures were removed today  Begin physical therapy as planned  Continue to wear sling at all times until further notice  Return to clinic in 1 month with Dr. Webb              "

## 2025-02-19 ENCOUNTER — CLINICAL SUPPORT (OUTPATIENT)
Dept: REHABILITATION | Facility: HOSPITAL | Age: 65
End: 2025-02-19
Payer: MEDICARE

## 2025-02-19 DIAGNOSIS — S46.012A TRAUMATIC COMPLETE TEAR OF LEFT ROTATOR CUFF, INITIAL ENCOUNTER: Primary | ICD-10-CM

## 2025-02-19 DIAGNOSIS — S46.012A TRAUMATIC COMPLETE TEAR OF LEFT ROTATOR CUFF, INITIAL ENCOUNTER: ICD-10-CM

## 2025-02-19 PROCEDURE — 97140 MANUAL THERAPY 1/> REGIONS: CPT | Mod: HCNC,CQ

## 2025-02-19 PROCEDURE — 97110 THERAPEUTIC EXERCISES: CPT | Mod: HCNC,CQ

## 2025-02-19 RX ORDER — PROMETHAZINE HYDROCHLORIDE 25 MG/1
25 TABLET ORAL EVERY 6 HOURS PRN
Qty: 30 TABLET | Refills: 0 | Status: SHIPPED | OUTPATIENT
Start: 2025-02-19

## 2025-02-26 ENCOUNTER — CLINICAL SUPPORT (OUTPATIENT)
Dept: REHABILITATION | Facility: HOSPITAL | Age: 65
End: 2025-02-26
Payer: MEDICARE

## 2025-02-26 DIAGNOSIS — S46.012A TRAUMATIC COMPLETE TEAR OF LEFT ROTATOR CUFF, INITIAL ENCOUNTER: Primary | ICD-10-CM

## 2025-02-26 PROCEDURE — 97110 THERAPEUTIC EXERCISES: CPT | Mod: HCNC,CQ

## 2025-02-26 NOTE — PROGRESS NOTES
Outpatient Rehab    Physical Therapy Visit    Patient Name: Mariam Bran  MRN: [id number]  YOB: 1960  Encounter Date: 2/26/2025    Therapy Diagnosis: No diagnosis found.  Physician: Colby Navarro*     Physician Orders: Eval and Treat  Medical Diagnosis: S46.012A (ICD-10-CM) - Traumatic complete tear of left rotator cuff, initial encounter      Visit # / Visits Authorized:  1 / 20   Date of Evaluation:  2/14/2025   Insurance Authorization Period: 2/14/2025   to 12/31/25  Plan of Care Certification:  2/14/2025 to 8/1/25      Time In: 1230   Time Out: 1330  Total Time: 60   Total Billable Time: 55    FOTO:  Intake Score:  %  Survey Score 1:  %  Survey Score 2:  %         Subjective   no pain right now due to taking pain meds prior to tx..  Pain reported as 0/10.      Objective            Treatment:  Therapeutic Exercise  Therapeutic Exercise Activity 1: moist heat 10' performing 4 way wrist with grn ball  Therapeutic Exercise Activity 2: scapular retraction 2x20  Therapeutic Exercise Activity 3: shld shrugs 2x20  Therapeutic Exercise Activity 4: table slides flexion 40x  Therapeutic Exercise Activity 5: pendulum swings fwd/bwd and side/side 2x30 ea  Therapeutic Exercise Activity 6: elbow flex/ext 40x    Manual Therapy  Manual Therapy Activity 1: scapular mobs  Manual Therapy Activity 2: oscillation  Manual Therapy Activity 3: PROM  Manual Therapy Activity 4: elbow flex/ext              Assessment & Plan   Assessment:         Patient will continue to benefit from skilled outpatient physical therapy to address the deficits listed in the problem list box on initial evaluation, provide pt/family education and to maximize pt's level of independence in the home and community environment.     Patient's spiritual, cultural, and educational needs considered and patient agreeable to plan of care and goals.           Plan:    Continue with POC  Goals:   Active       Functional outcome       Patient will  demonstrate independence in home program for support of progression       Start:  02/14/25    Expected End:  03/28/25               Pain       Patient will report pain of 4/10 demonstrating a reduction of overall pain       Start:  02/14/25    Expected End:  08/01/25               Range of Motion       Patient will achieve left shoulder flexion of 180 degrees       Start:  02/14/25    Expected End:  08/01/25            Patient will achieve left shoulder abduction of 180 degrees       Start:  02/14/25    Expected End:  08/01/25            Patient will achieve left shoulder external rotation of 80 degrees in 90 degrees abd       Start:  02/14/25    Expected End:  08/01/25            Patient will achieve left shoulder internal rotation of 80 degrees in 90 degrees abd       Start:  02/14/25    Expected End:  08/01/25               Strength       Patient will achieve left shoulder flexion strength of 5/5       Start:  02/14/25    Expected End:  08/01/25            Patient will achieve left shoulder abduction strength of 5/5       Start:  02/14/25    Expected End:  08/01/25            Patient will achieve left shoulder internal rotation strength of 5/5 in neutral       Start:  02/14/25    Expected End:  08/01/25               Strength       Patient will achieve left shoulder external rotation strength of 5/5 in neutral       Start:  02/14/25    Expected End:  08/01/25                Jerry Rodríguez, PTA, STS

## 2025-03-06 ENCOUNTER — TELEPHONE (OUTPATIENT)
Dept: SPORTS MEDICINE | Facility: CLINIC | Age: 65
End: 2025-03-06
Payer: MEDICARE

## 2025-03-06 DIAGNOSIS — S46.012A TRAUMATIC COMPLETE TEAR OF LEFT ROTATOR CUFF, INITIAL ENCOUNTER: ICD-10-CM

## 2025-03-06 DIAGNOSIS — Z98.890 S/P ROTATOR CUFF REPAIR: ICD-10-CM

## 2025-03-06 RX ORDER — METHOCARBAMOL 500 MG/1
500 TABLET, FILM COATED ORAL 3 TIMES DAILY PRN
Qty: 30 TABLET | Refills: 0 | Status: SHIPPED | OUTPATIENT
Start: 2025-03-06

## 2025-03-06 NOTE — TELEPHONE ENCOUNTER
Spoke w/ pt to see if they wanted to try a low grade narcotic(tramadol) because current med was not working. Pt refused and just asked for a refill of the current medication she was taking.

## 2025-03-10 ENCOUNTER — CLINICAL SUPPORT (OUTPATIENT)
Dept: REHABILITATION | Facility: HOSPITAL | Age: 65
End: 2025-03-10
Payer: MEDICARE

## 2025-03-10 DIAGNOSIS — M25.512 ACUTE PAIN OF LEFT SHOULDER: Primary | ICD-10-CM

## 2025-03-10 PROCEDURE — 97110 THERAPEUTIC EXERCISES: CPT | Mod: HCNC,CQ
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
## 2025-03-13 ENCOUNTER — OFFICE VISIT (OUTPATIENT)
Dept: SPORTS MEDICINE | Facility: CLINIC | Age: 65
End: 2025-03-13
Payer: MEDICARE

## 2025-03-13 VITALS
SYSTOLIC BLOOD PRESSURE: 152 MMHG | HEART RATE: 87 BPM | BODY MASS INDEX: 31.74 KG/M2 | WEIGHT: 179.13 LBS | HEIGHT: 63 IN | DIASTOLIC BLOOD PRESSURE: 78 MMHG

## 2025-03-13 DIAGNOSIS — Z98.890 S/P ROTATOR CUFF REPAIR: Primary | ICD-10-CM

## 2025-03-13 DIAGNOSIS — S46.012D TRAUMATIC COMPLETE TEAR OF LEFT ROTATOR CUFF, SUBSEQUENT ENCOUNTER: ICD-10-CM

## 2025-03-13 PROCEDURE — 99999 PR PBB SHADOW E&M-EST. PATIENT-LVL III: CPT | Mod: PBBFAC,HCNC,, | Performed by: STUDENT IN AN ORGANIZED HEALTH CARE EDUCATION/TRAINING PROGRAM

## 2025-03-13 NOTE — PROGRESS NOTES
Subjective:          Chief Complaint: Mariam Bran is a 64 y.o. female who had concerns including Pain of the Left Shoulder.    HPI 3/13/2025  History of Present Illness    CHIEF COMPLAINT:  - Left shoulder rotator cuff repair surgery follow-up.    HPI:  Client presents for follow-up 6 weeks after left shoulder rotator cuff repair surgery. She reports ongoing pain in the left shoulder. Pain worsens at night, causing difficulty sleeping. The shoulder feels very tight. She has been using a sling as part of the recovery process but is fatigued from wearing it. She is right-handed, suggesting the affected left shoulder is the non-dominant side. The recovery process is expected to take approximately six months, indicating ongoing rehabilitation is anticipated. She mentions some minor sutures were extruding, which is normal and will heal.    She denies ability to do too much during the day with the affected arm.    PREVIOUS TREATMENTS:  - Client has been using a sling for 6 weeks post-surgery as part of the treatment protocol.  - Physical therapy has been part of the rehabilitation process.         Past Medical History:   Diagnosis Date    Acute colitis 6/25/2021    Graves disease     Hyperthyroidism     S/P laparoscopic cholecystectomy 6-6-2021 6/24/2021       Medications Ordered Prior to Encounter[1]    Past Surgical History:   Procedure Laterality Date    ARTHROSCOPIC DEBRIDEMENT OF SHOULDER Left 1/31/2025    Procedure: DEBRIDEMENT, SHOULDER, ARTHROSCOPIC;  Surgeon: Manpreet Webb MD;  Location: [facility] OR;  Service: Orthopedics;  Laterality: Left;    ARTHROSCOPY OF SHOULDER WITH DECOMPRESSION OF SUBACROMIAL SPACE Left 1/31/2025    Procedure: ARTHROSCOPY, SHOULDER, WITH SUBACROMIAL SPACE DECOMPRESSION;  Surgeon: Manpreet Webb MD;  Location: [facility] OR;  Service: Orthopedics;  Laterality: Left;    ARTHROSCOPY,SHOULDER,WITH BICEPS TENODESIS Left 1/31/2025    Procedure: ARTHROSCOPY,SHOULDER,WITH BICEPS TENODESIS;   Surgeon: Manpreet Webb MD;  Location: [facility] OR;  Service: Orthopedics;  Laterality: Left;    COLONOSCOPY      COLONOSCOPY N/A 6/20/2024    Procedure: COLONOSCOPY;  Surgeon: Kolby Yao MD;  Location: [facility] ENDO;  Service: Endoscopy;  Laterality: N/A;    LAPAROSCOPIC CHOLECYSTECTOMY N/A 06/06/2021    Procedure: CHOLECYSTECTOMY, LAPAROSCOPIC;  Surgeon: Sandy Delaney MD;  Location: [facility] OR [address] [place] [address];  Service: General;  Laterality: N/A;    ROTATOR CUFF REPAIR Left 1/31/2025    Procedure: ARTHROSCOPY SHOULDER REPAIR, ROTATOR CUFF;  Surgeon: Manpreet Webb MD;  Location: [facility] OR;  Service: Orthopedics;  Laterality: Left;  WITH CATHETER       No family history on file.    Social History[2]   Review of Systems   All other systems reviewed and are negative.                  Objective:        General: Mariam is well-developed, well-nourished, appears stated age, in no acute distress, alert and oriented to time, place and person.     General    Vitals reviewed.  Constitutional: She is oriented to person, place, and time. She appears well-developed and well-nourished. No distress.   HENT:   Nose: Nose normal. Mouth/Throat: No oropharyngeal exudate.   Eyes: Pupils are equal, round, and reactive to light. Right eye exhibits no discharge. Left eye exhibits no discharge.   Cardiovascular:  Normal rate and intact distal pulses.            Pulmonary/Chest: Effort normal and breath sounds normal. No respiratory distress.   Neurological: She is alert and oriented to person, place, and time. She has normal reflexes. She displays normal reflexes. No cranial nerve deficit. Coordination normal.   Psychiatric: She has a normal mood and affect. Her behavior is normal. Judgment and thought content normal.         Right Shoulder Exam   Right shoulder exam is normal.    Inspection/Observation   Swelling: absent  Bruising: absent  Scars: absent  Deformity: absent  Scapular Winging: absent  Scapular Dyskinesia:  negative  Atrophy: absent    Tenderness   The patient is experiencing no tenderness.    Range of Motion   Active abduction:  90 normal   Passive abduction:  100 normal   Extension:  0 normal   Forward Flexion:  180 normal   Forward Elevation: 180 normal  Adduction: 40 normal  External Rotation 0 degrees:  50 normal   External Rotation 90 degrees: 90 normal  Internal rotation 0 degrees:  L1 normal   Internal rotation 90 degrees:  30 normal     Tests & Signs   Apprehension: negative  Cross arm: negative  Drop arm: negative  Dupree test: negative  Impingement: negative  Sulcus: absent  Anterosuperior Escape: negative  Lag Sign 0 degrees: negative  Lag Sign 90 degrees: negative  Lift Off Sign: negative  Belly Press: negative  Active Compression Test ([place]'s Sign): negative  Yergason's Test: negative  Speed's Test: negative  Anterior Drawer Test: 1+   Posterior Drawer Test: 1+  Relocation 90 degrees: negative  Relocation > 90 degrees: negative  Bear Hug: negative  Moving Valgus: negative  Jerk Test: negative    Other   Sensation: normal    Left Shoulder Exam     Inspection/Observation   Swelling: absent  Bruising: absent  Scars: present  Deformity: absent  Scapular Winging: absent  Scapular Dyskinesia: negative  Atrophy: absent    Tenderness   The patient is tender to palpation of the greater tuberosity.    Range of Motion   Active abduction:  90 normal   Passive abduction:  100 normal   Extension:  0 normal   Forward Flexion:  100 normal   Forward Elevation: 100 normal  Adduction: 40 normal  External Rotation 0 degrees:  40 normal   External Rotation 90 degrees: 90 normal  Internal rotation 0 degrees:  L5 normal   Internal rotation 90 degrees:  30 normal     Tests & Signs   Apprehension: negative  Cross arm: negative  Drop arm: negative  Dupree test: negative  Impingement: negative  Sulcus: absent  Rotator Cuff Painful Arc/Range: mild  Anterosuperior Escape: negative  Lag Sign 0 degrees: negative  Lag Sign 90  degrees: negative  Lift Off Sign: negative  Belly Press: negative  Active Compression test ([place]'s Sign): negative  Yergasons's Test: negative  Speed's Test: negative  Anterior Drawer Test: 1+  Posterior Drawer Test: 1+  Relocation 90 degrees: negative  Relocation > 90 degrees: negative  Bear Hug: negative  Moving Valgus: negative  Jerk Test: negative    Other   Sensation: normal     Comments:  Incision clean, dry, no drainage  No sign of infection  Compartments soft  Neurovascular status intact in extremity      Muscle Strength   Right Upper Extremity   Shoulder Abduction: 5/5   Shoulder Internal Rotation: 5/5   Shoulder External Rotation: 5/5   Supraspinatus: 5/5   Subscapularis: 5/5   Biceps: 5/5     Reflexes     Left Side  Biceps:  2+  Triceps:  2+  Brachioradialis:  2+    Right Side   Biceps:  2+  Triceps:  2+  Brachioradialis:  2+    Vascular Exam     Right Pulses      Radial:                    2+      Left Pulses      Radial:                    2+      Capillary Refill  Right Hand: normal capillary refill  Left Hand: normal capillary refill                Assessment:     Mariam Bran is a 64 y.o. female status post above and doing well  Encounter Diagnoses   Name Primary?    S/P rotator cuff repair Yes    Traumatic complete tear of left rotator cuff, subsequent encounter           Plan:       Assessment & Plan    FOLLOW UP:  - Follow up in about 6 weeks.    PATIENT INSTRUCTIONS:  - Discontinue use of sling, except in crowded situations like parades in the next 1-2 weeks.  - Use left upper extremity more for daily activities such as brushing teeth, eating, and washing hair.  - Attempt to lift and hold left upper extremity up to loosen it and build functional strength.       This note was generated with the assistance of ambient listening technology. Verbal consent was obtained by the patient and accompanying visitor(s) for the recording of patient appointment to facilitate this note. I attest to having  reviewed and edited the generated note for accuracy, though some syntax or spelling errors may persist. Please contact the author of this note for any clarification.                     [1]   Current Outpatient Medications on File Prior to Visit   Medication Sig Dispense Refill    ALPRAZolam (XANAX) 1 MG tablet Take 1 tablet (1 mg total) by mouth 2 (two) times daily as needed (muscle spasms).      amitriptyline (ELAVIL) 150 MG Tab Take 150 mg by mouth every evening.       atorvastatin (LIPITOR) 40 MG tablet Take 40 mg by mouth once daily.      blood sugar diagnostic Strp To check BG 3 times daily, to use with insurance preferred meter 100 each 0    celecoxib (CELEBREX) 200 MG capsule Take 1 capsule (200 mg total) by mouth 2 (two) times daily with meals. 60 capsule 0    hydroCHLOROthiazide (HYDRODIURIL) 50 MG tablet Take 50 mg by mouth.      HYDROcodone-acetaminophen (NORCO)  mg per tablet Take 1 tablet by mouth 4 (four) times daily as needed for Pain.      JARDIANCE 25 mg tablet Take 25 mg by mouth.      lancets Misc To check BG 3 times daily, to use with insurance preferred meter 100 each 0    lisinopriL 10 MG tablet TAKE 1 TABLET BY MOUTH ONCE DAILY 30 tablet 1    methIMAzole (TAPAZOLE) 5 MG Tab Take half a 5 mg tablet a day (2.5 mg dose). 45 tablet 1    methocarbamoL (ROBAXIN) 500 MG Tab Take 1 tablet (500 mg total) by mouth 3 (three) times daily as needed (muscle spasms). 30 tablet 0    paroxetine (PAXIL) 40 MG tablet Take 40 mg by mouth once daily.       promethazine (PHENERGAN) 25 MG tablet Take 1 tablet (25 mg total) by mouth every 6 (six) hours as needed for Nausea. 30 tablet 0    amLODIPine (NORVASC) 10 MG tablet Take 1 tablet (10 mg total) by mouth once daily. 30 tablet 2    aspirin (ECOTRIN) 81 MG EC tablet Take 1 tablet (81 mg total) by mouth once daily. (Patient not taking: Reported on 3/13/2025) 42 tablet 0    ondansetron (ZOFRAN-ODT) 4 MG TbDL Take 1 tablet (4 mg total) by mouth every 6 (six)  hours as needed (nausea/vomiting). (Patient not taking: Reported on 3/13/2025) 12 tablet 0    oxyCODONE (ROXICODONE) 5 MG immediate release tablet Take 1 tablet (5 mg total) by mouth every 6 (six) hours as needed for Pain. (Patient not taking: Reported on 3/13/2025) 28 tablet 0     No current facility-administered medications on file prior to visit.   [2]   Social History  Socioeconomic History    Marital status:    Tobacco Use    Smoking status: Every Day     Types: Cigarettes    Smokeless tobacco: Never    Tobacco comments:     quit 5/2021   Substance and Sexual Activity    Alcohol use: Never    Drug use: Never

## 2025-03-21 ENCOUNTER — CLINICAL SUPPORT (OUTPATIENT)
Dept: REHABILITATION | Facility: HOSPITAL | Age: 65
End: 2025-03-21
Payer: MEDICARE

## 2025-03-21 DIAGNOSIS — Z98.890 S/P ROTATOR CUFF REPAIR: ICD-10-CM

## 2025-03-21 DIAGNOSIS — M25.512 ACUTE PAIN OF LEFT SHOULDER: Primary | ICD-10-CM

## 2025-03-21 DIAGNOSIS — S46.012A TRAUMATIC COMPLETE TEAR OF LEFT ROTATOR CUFF, INITIAL ENCOUNTER: ICD-10-CM

## 2025-03-21 PROCEDURE — 97140 MANUAL THERAPY 1/> REGIONS: CPT | Mod: HCNC

## 2025-03-21 PROCEDURE — 97530 THERAPEUTIC ACTIVITIES: CPT | Mod: HCNC

## 2025-03-21 NOTE — PROGRESS NOTES
Outpatient Rehab    Physical Therapy Visit    Patient Name: Mariam Bran  MRN: [id number]  YOB: 1960  Encounter Date: 3/21/2025    Therapy Diagnosis:   Encounter Diagnosis   Name Primary?    Acute pain of left shoulder Yes       Physician Orders: Eval and Treat  Medical Diagnosis: S46.012A (ICD-10-CM) - Traumatic complete tear of left rotator cuff, initial encounter      Visit # / Visits Authorized:  1 / 20   Date of Evaluation:  2/14/2025   Insurance Authorization Period: 2/14/2025   to 12/31/25  Plan of Care Certification:  2/14/2025 to 8/1/25    Physician: Colby Navarro*       Time In:   100 pm  Time Out:  150 pm  Total Time:   50  Total Billable Time: 50    FOTO:  Intake Score:  %  Survey Score 1:  %  Survey Score 2:  %         Subjective   She is still having pain in shoulder. Has d/c sling and had a good follow up with MD..  Pain reported as 8/10.      Objective            Treatment:  Therapeutic Exercise  TE 1: moist heat 10' for LLLD inferior glide of humeral head    Manual Therapy  MT 1: scapular mobs  MT 2: oscillation  MT 3: PROM         Therapeutic Activity  TA 1: UBE 5 min fwd  TA 2: Dowel shoulder flex and ER 2x10 with 5 sec hold  TA 3: scapular rows otb 2x15 with 5 sec hold  TA 4: shoulder ext ytb 2x15 with 5 sec hold    Assessment & Plan   Assessment: Pt tolerated transition into AAROM phase of rehab well. She continues to demonstrate good PROM. Still requires cues for proper scapular retraction and avoidng UT compensation. Plan to progress as tolerated.       Patient will continue to benefit from skilled outpatient physical therapy to address the deficits listed in the problem list box on initial evaluation, provide pt/family education and to maximize pt's level of independence in the home and community environment.     Patient's spiritual, cultural, and educational needs considered and patient agreeable to plan of care and goals.           Plan: Continue POC per pt  tolerance.    Goals:   Active       Functional outcome       Patient will demonstrate independence in home program for support of progression       Start:  02/14/25    Expected End:  03/28/25               Pain       Patient will report pain of 4/10 demonstrating a reduction of overall pain       Start:  02/14/25    Expected End:  08/01/25               Range of Motion       Patient will achieve left shoulder flexion of 180 degrees       Start:  02/14/25    Expected End:  08/01/25            Patient will achieve left shoulder abduction of 180 degrees       Start:  02/14/25    Expected End:  08/01/25            Patient will achieve left shoulder external rotation of 80 degrees in 90 degrees abd       Start:  02/14/25    Expected End:  08/01/25            Patient will achieve left shoulder internal rotation of 80 degrees in 90 degrees abd       Start:  02/14/25    Expected End:  08/01/25               Strength       Patient will achieve left shoulder flexion strength of 5/5       Start:  02/14/25    Expected End:  08/01/25            Patient will achieve left shoulder abduction strength of 5/5       Start:  02/14/25    Expected End:  08/01/25            Patient will achieve left shoulder internal rotation strength of 5/5 in neutral       Start:  02/14/25    Expected End:  08/01/25               Strength       Patient will achieve left shoulder external rotation strength of 5/5 in neutral       Start:  02/14/25    Expected End:  08/01/25                Lissette Byrd, PT, DPT

## 2025-03-24 RX ORDER — METHOCARBAMOL 500 MG/1
500 TABLET, FILM COATED ORAL 3 TIMES DAILY PRN
Qty: 30 TABLET | Refills: 0 | Status: SHIPPED | OUTPATIENT
Start: 2025-03-24

## 2025-03-28 ENCOUNTER — CLINICAL SUPPORT (OUTPATIENT)
Dept: REHABILITATION | Facility: HOSPITAL | Age: 65
End: 2025-03-28
Payer: MEDICARE

## 2025-03-28 DIAGNOSIS — M25.512 ACUTE PAIN OF LEFT SHOULDER: Primary | ICD-10-CM

## 2025-03-28 PROCEDURE — 97530 THERAPEUTIC ACTIVITIES: CPT | Mod: HCNC

## 2025-03-28 PROCEDURE — 97140 MANUAL THERAPY 1/> REGIONS: CPT | Mod: HCNC

## 2025-03-28 NOTE — PROGRESS NOTES
Outpatient Rehab    Physical Therapy Visit    Patient Name: Mariam Bran  MRN: [id number]  YOB: 1960  Encounter Date: 3/28/2025    Therapy Diagnosis:   Encounter Diagnosis   Name Primary?    Acute pain of left shoulder Yes       Physician Orders: Eval and Treat  Medical Diagnosis: S46.012A (ICD-10-CM) - Traumatic complete tear of left rotator cuff, initial encounter      Visit # / Visits Authorized:  1 / 20   Date of Evaluation:  2/14/2025   Insurance Authorization Period: 2/14/2025   to 12/31/25  Plan of Care Certification:  2/14/2025 to 8/1/25    Physician: Colby Navarro*       Time In: 1300   Time Out: 1345  Total Time: 45  Total Billable Time:     FOTO:  Intake Score:  %  Survey Score 1:  %  Survey Score 2:  %         Subjective   Fine after last session, just sore and tired..  Pain reported as 6/10.      Objective            Treatment:       Manual Therapy  MT 3: PROM in all planes  MT 4: elbow and wrist grade III mobilizations flex/ext/pro/sup         Therapeutic Activity  TA 1: UBE 3 min fwd/ 3 min back  TA 2: Dowel shoulder flex and ER 3x10 with 5 sec hold  TA 3: scapular rows otb 2x15 with 5 sec hold  TA 4: shoulder ext otb 2x15 with 5 sec hold  TA 5: Pulleys 3'/3' flex/scap    Assessment & Plan   Assessment: Pt was able to add more AAROM and repetitions well this session with no increase in pain. Plan to continue progressing as tolerated.       Patient will continue to benefit from skilled outpatient physical therapy to address the deficits listed in the problem list box on initial evaluation, provide pt/family education and to maximize pt's level of independence in the home and community environment.     Patient's spiritual, cultural, and educational needs considered and patient agreeable to plan of care and goals.           Plan: Continue POC per pt tolerance.    Goals:   Active       Functional outcome       Patient will demonstrate independence in home program for support of  progression       Start:  02/14/25    Expected End:  03/28/25               Pain       Patient will report pain of 4/10 demonstrating a reduction of overall pain       Start:  02/14/25    Expected End:  08/01/25               Range of Motion       Patient will achieve left shoulder flexion of 180 degrees       Start:  02/14/25    Expected End:  08/01/25            Patient will achieve left shoulder abduction of 180 degrees       Start:  02/14/25    Expected End:  08/01/25            Patient will achieve left shoulder external rotation of 80 degrees in 90 degrees abd       Start:  02/14/25    Expected End:  08/01/25            Patient will achieve left shoulder internal rotation of 80 degrees in 90 degrees abd       Start:  02/14/25    Expected End:  08/01/25               Strength       Patient will achieve left shoulder flexion strength of 5/5       Start:  02/14/25    Expected End:  08/01/25            Patient will achieve left shoulder abduction strength of 5/5       Start:  02/14/25    Expected End:  08/01/25            Patient will achieve left shoulder internal rotation strength of 5/5 in neutral       Start:  02/14/25    Expected End:  08/01/25               Strength       Patient will achieve left shoulder external rotation strength of 5/5 in neutral       Start:  02/14/25    Expected End:  08/01/25                Lissette Byrd, PT, DPT

## 2025-04-24 ENCOUNTER — OFFICE VISIT (OUTPATIENT)
Dept: SPORTS MEDICINE | Facility: CLINIC | Age: 65
End: 2025-04-24
Payer: MEDICARE

## 2025-04-24 VITALS
SYSTOLIC BLOOD PRESSURE: 122 MMHG | DIASTOLIC BLOOD PRESSURE: 68 MMHG | WEIGHT: 178 LBS | HEART RATE: 80 BPM | HEIGHT: 63 IN | BODY MASS INDEX: 31.54 KG/M2

## 2025-04-24 DIAGNOSIS — Z98.890 S/P ROTATOR CUFF REPAIR: Primary | ICD-10-CM

## 2025-04-24 DIAGNOSIS — S46.012D TRAUMATIC COMPLETE TEAR OF LEFT ROTATOR CUFF, SUBSEQUENT ENCOUNTER: ICD-10-CM

## 2025-04-24 PROCEDURE — 99999 PR PBB SHADOW E&M-EST. PATIENT-LVL IV: CPT | Mod: PBBFAC,HCNC,, | Performed by: STUDENT IN AN ORGANIZED HEALTH CARE EDUCATION/TRAINING PROGRAM

## 2025-04-24 RX ORDER — METHOCARBAMOL 500 MG/1
500 TABLET, FILM COATED ORAL 4 TIMES DAILY
Qty: 40 TABLET | Refills: 0 | Status: SHIPPED | OUTPATIENT
Start: 2025-04-24 | End: 2025-05-04

## 2025-04-24 NOTE — PROGRESS NOTES
POST-OPERATIVE EXAMINATION    History of Present Illness    CHIEF COMPLAINT:  - Left shoulder post-op follow-up    HPI:  Client presents for a 3-month post-op follow-up visit after left shoulder rotator cuff repair performed in January. She reports her shoulder is improving gradually. She discontinued PT due to significant pain after sessions. She experienced severe pain after therapy sessions, which persisted for hours.    She can perform some household tasks like mopping and vacuuming, but with limitations. She adapts her movements to minimize use of the affected arm. For relief, she uses a hot tub but avoids direct jet pressure on the shoulder.    She denies using weights at home for rehabilitation and any formal medical diagnoses.    PREVIOUS TREATMENTS:  - Client attended PT twice weekly    WORK STATUS:  - Client is in college         PROCEDURE PERFORMED (Jon):   Left shoulder arthroscopic rotator cuff repair   Left shoulder arthroscopic biceps tenodesis   Left shoulder arthroscopic synovectomy   Left shoulder arthroscopic extensive debridement of the anterior, superior, posterior labrum   Left shoulder arthroscopic subacromial decompression    Past Medical History:   Diagnosis Date    Acute colitis 6/25/2021    Graves disease     Hyperthyroidism     S/P laparoscopic cholecystectomy 6-6-2021 6/24/2021       Medications Ordered Prior to Encounter[1]    Past Surgical History:   Procedure Laterality Date    ARTHROSCOPIC DEBRIDEMENT OF SHOULDER Left 1/31/2025    Procedure: DEBRIDEMENT, SHOULDER, ARTHROSCOPIC;  Surgeon: Manpreet Webb MD;  Location: [facility] OR;  Service: Orthopedics;  Laterality: Left;    ARTHROSCOPY OF SHOULDER WITH DECOMPRESSION OF SUBACROMIAL SPACE Left 1/31/2025    Procedure: ARTHROSCOPY, SHOULDER, WITH SUBACROMIAL SPACE DECOMPRESSION;  Surgeon: Manpreet Webb MD;  Location: [facility] OR;  Service: Orthopedics;  Laterality: Left;    ARTHROSCOPY,SHOULDER,WITH BICEPS TENODESIS Left 1/31/2025  "   Procedure: ARTHROSCOPY,SHOULDER,WITH BICEPS TENODESIS;  Surgeon: Manpreet Webb MD;  Location: [facility] OR;  Service: Orthopedics;  Laterality: Left;    COLONOSCOPY      COLONOSCOPY N/A 6/20/2024    Procedure: COLONOSCOPY;  Surgeon: Kolby Yao MD;  Location: [facility] ENDO;  Service: Endoscopy;  Laterality: N/A;    LAPAROSCOPIC CHOLECYSTECTOMY N/A 06/06/2021    Procedure: CHOLECYSTECTOMY, LAPAROSCOPIC;  Surgeon: Sandy Delaney MD;  [facility];  Service: General;  Laterality: N/A;    ROTATOR CUFF REPAIR Left 1/31/2025    Procedure: ARTHROSCOPY SHOULDER REPAIR, ROTATOR CUFF;  Surgeon: Manpreet Webb MD;  Location: [facility] OR;  Service: Orthopedics;  Laterality: Left;  WITH CATHETER       No family history on file.    Social History[2]     PHYSICAL EXAMINATION:  /68   Pulse 80   Ht 5' 3" (1.6 m)   Wt 80.7 kg (178 lb)   BMI 31.53 kg/m²   General: Well-developed well-nourished 64 y.o. female in no acute distress   Cardiovascular: Regular rhythm   Lungs: No labored breathing or wheezing appreciated   Neuro: Alert and oriented ×3   Psychiatric: well oriented to person, place and time, demonstrates normal mood and affect   Skin: No rashes, lesions or ulcers, normal temperature, turgor, and texture on involved extremity    ORTHOPEDIC EXAM:  Normal post-operative swelling  Normal post-operative scarring  Strength:  Deferred  ROM:  Active motion 110/30, passive motion 140/50.    ASSESSMENT:    Mariam Bran is a 64 y.o. female status post above and doing well    1. S/P rotator cuff repair  Ambulatory Referral/Consult to Physical Therapy      2. Traumatic complete tear of left rotator cuff, subsequent encounter  Ambulatory Referral/Consult to Physical Therapy                 PLAN:       Assessment & Plan    REFERRALS:  - Referred to PT for left shoulder.    FOLLOW UP:  - Follow up in 1.5 to 2 months.    PATIENT INSTRUCTIONS:  - Avoid using weights for exercise.  - Continue hot tub, avoiding " direct jet pressure on the surgical site.       This note was generated with the assistance of ambient listening technology. Verbal consent was obtained by the patient and accompanying visitor(s) for the recording of patient appointment to facilitate this note. I attest to having reviewed and edited the generated note for accuracy, though some syntax or spelling errors may persist. Please contact the author of this note for any clarification.           [1]   Current Outpatient Medications on File Prior to Visit   Medication Sig Dispense Refill    ALPRAZolam (XANAX) 1 MG tablet Take 1 tablet (1 mg total) by mouth 2 (two) times daily as needed (muscle spasms).      amitriptyline (ELAVIL) 150 MG Tab Take 150 mg by mouth every evening.       atorvastatin (LIPITOR) 40 MG tablet Take 40 mg by mouth once daily.      blood sugar diagnostic Strp To check BG 3 times daily, to use with insurance preferred meter 100 each 0    celecoxib (CELEBREX) 200 MG capsule Take 1 capsule (200 mg total) by mouth 2 (two) times daily with meals. 60 capsule 0    hydroCHLOROthiazide (HYDRODIURIL) 50 MG tablet Take 50 mg by mouth.      HYDROcodone-acetaminophen (NORCO)  mg per tablet Take 1 tablet by mouth 4 (four) times daily as needed for Pain.      JARDIANCE 25 mg tablet Take 25 mg by mouth.      lancets Misc To check BG 3 times daily, to use with insurance preferred meter 100 each 0    lisinopriL 10 MG tablet TAKE 1 TABLET BY MOUTH ONCE DAILY 30 tablet 1    methIMAzole (TAPAZOLE) 5 MG Tab Take half a 5 mg tablet a day (2.5 mg dose). 45 tablet 1    methocarbamoL (ROBAXIN) 500 MG Tab Take 1 tablet (500 mg total) by mouth 3 (three) times daily as needed (muscle spasms). 30 tablet 0    paroxetine (PAXIL) 40 MG tablet Take 40 mg by mouth once daily.       promethazine (PHENERGAN) 25 MG tablet Take 1 tablet (25 mg total) by mouth every 6 (six) hours as needed for Nausea. 30 tablet 0    amLODIPine (NORVASC) 10 MG tablet Take 1 tablet (10 mg  total) by mouth once daily. 30 tablet 2    aspirin (ECOTRIN) 81 MG EC tablet Take 1 tablet (81 mg total) by mouth once daily. (Patient not taking: Reported on 3/13/2025) 42 tablet 0    ondansetron (ZOFRAN-ODT) 4 MG TbDL Take 1 tablet (4 mg total) by mouth every 6 (six) hours as needed (nausea/vomiting). (Patient not taking: Reported on 4/24/2025) 12 tablet 0    oxyCODONE (ROXICODONE) 5 MG immediate release tablet Take 1 tablet (5 mg total) by mouth every 6 (six) hours as needed for Pain. (Patient not taking: Reported on 4/24/2025) 28 tablet 0     No current facility-administered medications on file prior to visit.   [2]   Social History  Socioeconomic History    Marital status:    Tobacco Use    Smoking status: Every Day     Types: Cigarettes    Smokeless tobacco: Never    Tobacco comments:     quit 5/2021   Substance and Sexual Activity    Alcohol use: Never    Drug use: Never

## 2025-05-08 ENCOUNTER — CLINICAL SUPPORT (OUTPATIENT)
Dept: REHABILITATION | Facility: HOSPITAL | Age: 65
End: 2025-05-08
Payer: MEDICARE

## 2025-05-08 DIAGNOSIS — Z98.890 S/P ROTATOR CUFF REPAIR: ICD-10-CM

## 2025-05-08 DIAGNOSIS — S46.012D TRAUMATIC COMPLETE TEAR OF LEFT ROTATOR CUFF, SUBSEQUENT ENCOUNTER: ICD-10-CM

## 2025-05-08 DIAGNOSIS — M25.512 ACUTE PAIN OF LEFT SHOULDER: Primary | ICD-10-CM

## 2025-05-08 PROCEDURE — 97110 THERAPEUTIC EXERCISES: CPT | Mod: HCNC

## 2025-05-08 PROCEDURE — 97530 THERAPEUTIC ACTIVITIES: CPT | Mod: HCNC

## 2025-05-08 PROCEDURE — 97112 NEUROMUSCULAR REEDUCATION: CPT | Mod: HCNC

## 2025-05-08 NOTE — PROGRESS NOTES
Outpatient Rehab    Physical Therapy Progress Note    Patient Name: Mariam Bran  MRN: [id number]  YOB: 1960  Encounter Date: 5/8/2025    Therapy Diagnosis:   Encounter Diagnoses   Name Primary?    S/P rotator cuff repair     Traumatic complete tear of left rotator cuff, subsequent encounter     Acute pain of left shoulder Yes     Physician: Colby Navarro*    Physician Orders: Eval and Treat  Medical Diagnosis: Traumatic complete tear of left rotator cuff, initial encounter  S/P rotator cuff repair  Traumatic complete tear of left rotator cuff, subsequent encounter    Visit # / Visits Authorized:  6 / 20  Insurance Authorization Period: 2/14/2025 to 7/10/2025  Date of Evaluation: 2/14/25  Plan of Care Certification: 2/14/25 to 8/1/25     Time In: 1300   Time Out: 1355  Total Time (in minutes): 55   Total Billable Time (in minutes):      FOTO:  Intake Score:  %  Survey Score 2:  %  Survey Score 3:  %         Subjective   Pt reports stopping PT for a bit because it would hurt every time she would come. She had a follow up with MD and was educated she needed to continue PT..  Pain reported as 8/10.      Objective      Shoulder Range of Motion  Left Shoulder   Active (deg) Passive (deg) Pain   Flexion   160     Extension         Scaption         ABduction         ADduction         Horizontal ABduction         Horizontal ADduction         External Rotation (Shoulder ABducted 0 degrees)         External Rotation (Shoulder ABducted 45 degrees)         External Rotation (Shoulder ABducted 90 degrees)   90     Internal Rotation (Shoulder ABducted 0 degrees)         Internal Rotation (Shoulder ABducted 45 degrees)         Internal Rotation (Shoulder ABducted 90 degrees)   60                      Treatment:     Mariam received therapeutic exercises to develop education for 15 minutes including:  FOTO  PT reassessment, pt education and HEP review      Mariam participated in neuromuscular re-education  activities to improve: Coordination, Kinesthetic, Proprioception, and Posture for 15 minutes. The following activities were included:  SL shoulder ER (1#), flex and abd 2x10    Mariam participated in dynamic functional therapeutic activities to improve functional performance for 25  minutes, including:  UBE 4'/4'  Pulleys 3'/3' (flex/scap)  Dowel supine flex and ER 3x10    .        Time Entry(in minutes):  Neuromuscular Re-Education Time Entry: 15  Therapeutic Activity Time Entry: 25  Therapeutic Exercise Time Entry: 15    Assessment & Plan   Assessment: Upon reassessment, pt showing good PROM, but still weakness with AROM and scapular comensations. Plan to continue progressing AROM tolerance and strengthening per protocol.       Patient will continue to benefit from skilled outpatient physical therapy to address the deficits listed in the problem list box on initial evaluation, provide pt/family education and to maximize pt's level of independence in the home and community environment.     Patient's spiritual, cultural, and educational needs considered and patient agreeable to plan of care and goals.           Plan: Continue POC per pt tolerance.    Goals:   Active       Functional outcome       Patient will demonstrate independence in home program for support of progression (Progressing)       Start:  02/14/25    Expected End:  05/29/25               Pain       Patient will report pain of 4/10 demonstrating a reduction of overall pain (Progressing)       Start:  02/14/25    Expected End:  08/01/25               Range of Motion       Patient will achieve left shoulder flexion of 180 degrees (Progressing)       Start:  02/14/25    Expected End:  08/01/25            Patient will achieve left shoulder abduction of 180 degrees (Progressing)       Start:  02/14/25    Expected End:  08/01/25            Patient will achieve left shoulder external rotation of 80 degrees in 90 degrees abd (Met)       Start:  02/14/25     Expected End:  08/01/25    Resolved:  05/08/25         Patient will achieve left shoulder internal rotation of 80 degrees in 90 degrees abd (Progressing)       Start:  02/14/25    Expected End:  08/01/25               Strength       Patient will achieve left shoulder flexion strength of 5/5 (Progressing)       Start:  02/14/25    Expected End:  08/01/25            Patient will achieve left shoulder abduction strength of 5/5 (Progressing)       Start:  02/14/25    Expected End:  08/01/25            Patient will achieve left shoulder internal rotation strength of 5/5 in neutral (Progressing)       Start:  02/14/25    Expected End:  08/01/25               Strength       Patient will achieve left shoulder external rotation strength of 5/5 in neutral (Progressing)       Start:  02/14/25    Expected End:  08/01/25                Lissette Byrd, PT

## 2025-05-15 ENCOUNTER — CLINICAL SUPPORT (OUTPATIENT)
Dept: REHABILITATION | Facility: HOSPITAL | Age: 65
End: 2025-05-15
Attending: STUDENT IN AN ORGANIZED HEALTH CARE EDUCATION/TRAINING PROGRAM
Payer: MEDICARE

## 2025-05-15 DIAGNOSIS — Z98.890 S/P ROTATOR CUFF REPAIR: Primary | ICD-10-CM

## 2025-05-15 PROCEDURE — 97112 NEUROMUSCULAR REEDUCATION: CPT | Mod: HCNC,CQ

## 2025-05-15 PROCEDURE — 97530 THERAPEUTIC ACTIVITIES: CPT | Mod: HCNC,CQ

## 2025-05-15 NOTE — PROGRESS NOTES
"  Outpatient Rehab    Physical Therapy Progress Note    Patient Name: Mariam Bran  MRN: [id number]  YOB: 1960  Encounter Date: 5/15/2025    Therapy Diagnosis:   No diagnosis found.    Physician: Manpreet Webb MD    Physician Orders: Eval and Treat  Medical Diagnosis: Traumatic complete tear of left rotator cuff, initial encounter    Visit # / Visits Authorized:  7 / 20  Insurance Authorization Period: 2/14/2025 to 7/10/2025  Date of Evaluation: 2/14/25  Plan of Care Certification: 2/14/25 to 8/1/25     Time In: 1350   Time Out: 1440  Total Time (in minutes): 50   Total Billable Time (in minutes): 50    FOTO:  Intake Score:  %  Survey Score 2:  %  Survey Score 3:  %         Subjective   mod shld pain today, "but its getting better". requested no table exercise due to R knee pain and difficulty getting up..  Pain reported as 5/10.      Objective            Treatment:     Mariam received therapeutic exercises to develop education for 0 minutes including:      Mariam participated in neuromuscular re-education activities to improve: Coordination, Kinesthetic, Proprioception, and Posture for 25 minutes. The following activities were included:  moist heat 10' for LLLD inferior glide of humeral head   Seated GTB row 4x10  Seated OTB B shld ext 4x10  Seated wand shld flexion 3x10    Mariam participated in dynamic functional therapeutic activities to improve functional performance for 25  minutes, including:  UBE 5'/5'  Pulleys 5'/5' (flex/scap)   Dowel supine flex and ER 3x10s  Seated wand chest press 3x10/3"    .  RICE 10' L shld after tx.         Time Entry(in minutes):       Assessment & Plan   Assessment: pt tolerating tx well with no increased shld pain but appropriate mm soreness and fatigue after tx. limited with activity due to unable to tolerate plinth at present time. VC/TC for correcting form/technique. progress as tolerated.       Patient will continue to benefit from skilled outpatient physical " therapy to address the deficits listed in the problem list box on initial evaluation, provide pt/family education and to maximize pt's level of independence in the home and community environment.     Patient's spiritual, cultural, and educational needs considered and patient agreeable to plan of care and goals.           Plan: Continue POC per pt tolerance.    Goals:   tive         Functional outcome         Patient will demonstrate independence in home program for support of progression         Start:  02/14/25    Expected End:  03/28/25                 Pain         Patient will report pain of 4/10 demonstrating a reduction of overall pain         Start:  02/14/25    Expected End:  08/01/25                 Range of Motion         Patient will achieve left shoulder flexion of 180 degrees         Start:  02/14/25    Expected End:  08/01/25              Patient will achieve left shoulder abduction of 180 degrees         Start:  02/14/25    Expected End:  08/01/25              Patient will achieve left shoulder external rotation of 80 degrees in 90 degrees abd         Start:  02/14/25    Expected End:  08/01/25              Patient will achieve left shoulder internal rotation of 80 degrees in 90 degrees abd         Start:  02/14/25    Expected End:  08/01/25                 Strength         Patient will achieve left shoulder flexion strength of 5/5         Start:  02/14/25    Expected End:  08/01/25              Patient will achieve left shoulder abduction strength of 5/5         Start:  02/14/25    Expected End:  08/01/25              Patient will achieve left shoulder internal rotation strength of 5/5 in neutral         Start:  02/14/25    Expected End:  08/01/25                 Strength         Patient will achieve left shoulder external rotation strength of 5/5 in neutral         Start:  02/14/25    Expected End:  08/01/25                        Jerry Rodríguez, FREDDY, STS

## 2025-05-21 ENCOUNTER — HOSPITAL ENCOUNTER (EMERGENCY)
Facility: HOSPITAL | Age: 65
Discharge: HOME | End: 2025-05-21
Attending: EMERGENCY MEDICINE
Payer: MEDICARE

## 2025-05-21 VITALS
HEART RATE: 76 BPM | TEMPERATURE: 100 F | OXYGEN SATURATION: 96 % | WEIGHT: 175 LBS | DIASTOLIC BLOOD PRESSURE: 57 MMHG | BODY MASS INDEX: 31.01 KG/M2 | RESPIRATION RATE: 15 BRPM | SYSTOLIC BLOOD PRESSURE: 118 MMHG | HEIGHT: 63 IN

## 2025-05-21 DIAGNOSIS — M79.606 LEG PAIN: ICD-10-CM

## 2025-05-21 DIAGNOSIS — M79.604 PAIN OF RIGHT LOWER EXTREMITY: Primary | ICD-10-CM

## 2025-05-21 PROCEDURE — 99284 EMERGENCY DEPT VISIT MOD MDM: CPT | Mod: 25,HCNC

## 2025-05-21 NOTE — ED TRIAGE NOTES
Patient reports that she has had swelling to her right leg, states that she has not had any injury, changes to medications or anything else to cause this swelling.

## 2025-05-21 NOTE — ED PROVIDER NOTES
Chief complaint:  Leg Swelling (R leg swelling for a few days)      HPI:  Mariam Bran is a 65 y.o. female presenting with acute onset of moderate aching pain to the posterior aspect of her calf and thigh that has been going on for the last couple of days.  She denies any trauma or specific injury.  She also has a noted some swelling to her right lower extremity and engorgement of her veins.  No chest pain or shortness of breath.  She does have some chronic low back pain but states this does not feel like anything she has ever had before including sciatica.    ROS: As per HPI and below:  Constitutional:  No fevers, no chills  Cardiac: no chest pain  Respiratory: no shortness of breath  Abdominal: no abdominal pain, no nausea, no vomiting  Neuro: no focal numbness, no focal weakness        Review of patient's allergies indicates:  No Known Allergies    Medications Ordered Prior to Encounter[1]    PMH:  As per HPI and below:  Past Medical History:   Diagnosis Date    Acute colitis 6/25/2021    Graves disease     Hyperthyroidism     S/P laparoscopic cholecystectomy 6-6-2021 6/24/2021     Past Surgical History:   Procedure Laterality Date    ARTHROSCOPIC DEBRIDEMENT OF SHOULDER Left 1/31/2025    Procedure: DEBRIDEMENT, SHOULDER, ARTHROSCOPIC;  Surgeon: Manpreet Webb MD;  Location: [facility] OR;  Service: Orthopedics;  Laterality: Left;    ARTHROSCOPY OF SHOULDER WITH DECOMPRESSION OF SUBACROMIAL SPACE Left 1/31/2025    Procedure: ARTHROSCOPY, SHOULDER, WITH SUBACROMIAL SPACE DECOMPRESSION;  Surgeon: Manpreet Webb MD;  Location: [facility] OR;  Service: Orthopedics;  Laterality: Left;    ARTHROSCOPY,SHOULDER,WITH BICEPS TENODESIS Left 1/31/2025    Procedure: ARTHROSCOPY,SHOULDER,WITH BICEPS TENODESIS;  Surgeon: Manpreet Webb MD;  Location: [facility] OR;  Service: Orthopedics;  Laterality: Left;    COLONOSCOPY      COLONOSCOPY N/A 6/20/2024    Procedure: COLONOSCOPY;  Surgeon: Kolby Yao MD;  Location: [facility];   Service: Endoscopy;  Laterality: N/A;    LAPAROSCOPIC CHOLECYSTECTOMY N/A 06/06/2021    Procedure: CHOLECYSTECTOMY, LAPAROSCOPIC;  Surgeon: Sandy Delaney MD;  Location: [facility] OR [address] [place] [address];  Service: General;  Laterality: N/A;    ROTATOR CUFF REPAIR Left 1/31/2025    Procedure: ARTHROSCOPY SHOULDER REPAIR, ROTATOR CUFF;  Surgeon: Manpreet Webb MD;  Location: [facility] OR;  Service: Orthopedics;  Laterality: Left;  WITH CATHETER       Social History     Socioeconomic History    Marital status:    Tobacco Use    Smoking status: Every Day     Types: Cigarettes    Smokeless tobacco: Never    Tobacco comments:     quit 5/2021   Substance and Sexual Activity    Alcohol use: Never    Drug use: Never       No family history on file.    Physical Exam:    Vitals:    05/21/25 0600   BP: (!) 118/57   Pulse: 76   Resp: 15   Temp: 99.6 °F (37.6 °C)     Constitutional: Well-nourished, well-developed, in no acute distress, not cachectic  Eyes: PERRLA, EOMI, normal conjunctiva, normal sclera  ENT: Moist Mucous membranes  Respiratory: Clear to auscultation bilaterally, no wheezes, no crackles, no rhonchi  Cardiovascular: Regular rate and rhythm, no murmurs, no rubs, no gallops  Abdominal: Soft, nontender, nondistended, no guarding, no rebound  Musculoskeletal: Normal range of motion, no obvious deformity, normal capillary refill, head atraumatic, neck supple, no meningismus, mild tenderness to palpation to her right calf and posterior thigh, multiple large varicose veins to bilateral lower extremities, 2/2 pulses, no significant edema bilaterally  Skin: no rash, no ecchymosis, no errythema, no discharge  Neurologic: Cranial nerves II through XII intact, no motor deficits, no sensory deficits, no cerebellar deficits  Psychological: Alert, oriented x3, normal affect, normal mood    Orders Placed This Encounter   Procedures    US Lower Extremity Veins Right    Hepatitis C Antibody    HCV Virus Hold Specimen    HIV 1/2 Ag/Ab  (4th Gen)       Medications - No data to display      Labs Reviewed   HEPATITIS C ANTIBODY   HEP C VIRUS HOLD SPECIMEN   HIV 1 / 2 ANTIBODY       US Lower Extremity Veins Right   Final Result      No evidence of deep venous thrombosis in the right lower extremity.         Electronically signed by: Kamille Araiza MD   Date:    05/21/2025   Time:    05:56          Medical Decision Making  Differential diagnosis includes leg spasm, leg strain, life-threatening DVT    Patient presents with several days of right lower extremity discomfort, vein engorgement, and slight swelling without trauma.  Concern for possible DVT.  Will perform ultrasound and re-evaluate.    Patient's lower extremity ultrasound is unremarkable for DVT.  Unclear etiology for her leg discomfort.  This may be a muscle strain or possibly some sciatica.  Will discharge home without any new medication as she has medications from her pain management doctor.    Amount and/or Complexity of Data Reviewed  External Data Reviewed:      Details: Orthopedic clinic visit on 03/13/2025 and 04/24/2025 for rotator cuff repair  Labs:      Details: No need for CBC or CMP at this time  Radiology: ordered and independent interpretation performed.     Details: Lower extremity ultrasound: No acute process      Procedures          ASSESSMENT  1. Pain of right lower extremity    2. Leg pain          Disposition:  Discharged home in stable condition    Discharge Medication List as of 5/21/2025  6:04 AM        Discharge Medication List as of 5/21/2025  6:04 AM        Discharge Medication List as of 5/21/2025  6:04 AM               [1]   No current facility-administered medications on file prior to encounter.     Current Outpatient Medications on File Prior to Encounter   Medication Sig Dispense Refill    ALPRAZolam (XANAX) 1 MG tablet Take 1 tablet (1 mg total) by mouth 2 (two) times daily as needed (muscle spasms).      amitriptyline (ELAVIL) 150 MG Tab Take 150 mg by mouth  every evening.       amLODIPine (NORVASC) 10 MG tablet Take 1 tablet (10 mg total) by mouth once daily. 30 tablet 2    aspirin (ECOTRIN) 81 MG EC tablet Take 1 tablet (81 mg total) by mouth once daily. (Patient not taking: Reported on 3/13/2025) 42 tablet 0    atorvastatin (LIPITOR) 40 MG tablet Take 40 mg by mouth once daily.      blood sugar diagnostic Strp To check BG 3 times daily, to use with insurance preferred meter 100 each 0    celecoxib (CELEBREX) 200 MG capsule Take 1 capsule (200 mg total) by mouth 2 (two) times daily with meals. 60 capsule 0    hydroCHLOROthiazide (HYDRODIURIL) 50 MG tablet Take 50 mg by mouth.      HYDROcodone-acetaminophen (NORCO)  mg per tablet Take 1 tablet by mouth 4 (four) times daily as needed for Pain.      JARDIANCE 25 mg tablet Take 25 mg by mouth.      lancets Misc To check BG 3 times daily, to use with insurance preferred meter 100 each 0    lisinopriL 10 MG tablet TAKE 1 TABLET BY MOUTH ONCE DAILY 30 tablet 1    methIMAzole (TAPAZOLE) 5 MG Tab Take half a 5 mg tablet a day (2.5 mg dose). 45 tablet 1    methocarbamoL (ROBAXIN) 500 MG Tab Take 1 tablet (500 mg total) by mouth 3 (three) times daily as needed (muscle spasms). 30 tablet 0    ondansetron (ZOFRAN-ODT) 4 MG TbDL Take 1 tablet (4 mg total) by mouth every 6 (six) hours as needed (nausea/vomiting). (Patient not taking: Reported on 4/24/2025) 12 tablet 0    oxyCODONE (ROXICODONE) 5 MG immediate release tablet Take 1 tablet (5 mg total) by mouth every 6 (six) hours as needed for Pain. (Patient not taking: Reported on 4/24/2025) 28 tablet 0    paroxetine (PAXIL) 40 MG tablet Take 40 mg by mouth once daily.       promethazine (PHENERGAN) 25 MG tablet Take 1 tablet (25 mg total) by mouth every 6 (six) hours as needed for Nausea. 30 tablet 0        Tariq Rainey III, MD  05/21/25 0712

## 2025-05-22 ENCOUNTER — DOCUMENTATION ONLY (OUTPATIENT)
Dept: REHABILITATION | Facility: HOSPITAL | Age: 65
End: 2025-05-22
Payer: MEDICARE

## 2025-05-22 NOTE — PROGRESS NOTES
Pt came to clinic today to let me know she just left the ER. Pt notes having swelling in RLE and vascular issues that are causing her a lot of pain, especially with WB. Pt was cleared at the ER from DVT. Pt reported she would not be able to attend PT today and will follow up with PCP. Plan to continue PT at next scheduled visit.     Lissette Byrd, PT

## 2025-05-27 ENCOUNTER — HOSPITAL ENCOUNTER (EMERGENCY)
Facility: HOSPITAL | Age: 65
Discharge: HOME | End: 2025-05-27
Attending: EMERGENCY MEDICINE
Payer: MEDICARE

## 2025-05-27 VITALS
HEIGHT: 63 IN | DIASTOLIC BLOOD PRESSURE: 64 MMHG | RESPIRATION RATE: 16 BRPM | WEIGHT: 175 LBS | OXYGEN SATURATION: 99 % | SYSTOLIC BLOOD PRESSURE: 129 MMHG | HEART RATE: 81 BPM | BODY MASS INDEX: 31.01 KG/M2 | TEMPERATURE: 99 F

## 2025-05-27 DIAGNOSIS — M79.604 RIGHT LEG PAIN: Primary | ICD-10-CM

## 2025-05-27 LAB
BUN SERPL-MCNC: 18 MG/DL (ref 6–30)
CHLORIDE SERPL-SCNC: 101 MMOL/L (ref 95–110)
CREAT SERPL-MCNC: 0.7 MG/DL (ref 0.5–1.4)
GLUCOSE SERPL-MCNC: 131 MG/DL (ref 70–110)
HCT VFR BLD CALC: 36 %PCV (ref 36–54)
POC IONIZED CALCIUM: 1.09 MMOL/L (ref 1.06–1.42)
POC TCO2 (MEASURED): 23 MMOL/L (ref 23–29)
POTASSIUM BLD-SCNC: 4.1 MMOL/L (ref 3.5–5.1)
SAMPLE: ABNORMAL
SODIUM BLD-SCNC: 138 MMOL/L (ref 136–145)

## 2025-05-27 PROCEDURE — 25000003 PHARM REV CODE 250: Performed by: PHYSICIAN ASSISTANT

## 2025-05-27 PROCEDURE — 99284 EMERGENCY DEPT VISIT MOD MDM: CPT | Mod: 25

## 2025-05-27 PROCEDURE — 80047 BASIC METABLC PNL IONIZED CA: CPT

## 2025-05-27 RX ORDER — HYDROCODONE BITARTRATE AND ACETAMINOPHEN 5; 325 MG/1; MG/1
2 TABLET ORAL
Refills: 0 | Status: COMPLETED | OUTPATIENT
Start: 2025-05-27 | End: 2025-05-27

## 2025-05-27 RX ADMIN — HYDROCODONE BITARTRATE AND ACETAMINOPHEN 2 TABLET: 5; 325 TABLET ORAL at 02:05

## 2025-05-27 NOTE — ED NOTES
I-STAT Chem-8+ Results:   Value Reference Range   Sodium 138 136-145 mmol/L   Potassium  4.1 3.5-5.1 mmol/L   Chloride 101  mmol/L   Ionized Calcium 1.09 1.06-1.42 mmol/L   CO2 (measured) 23 23-29 mmol/L   Glucose 131  mg/dL   BUN 10 6-30 mg/dL   Creatinine 0.9 0.5-1.4 mg/dL   Hematocrit 36 36-54%

## 2025-05-27 NOTE — ED PROVIDER NOTES
Encounter Date: 5/27/2025       History     Chief Complaint   Patient presents with    Leg Pain     Reports chronic worsening right leg pain since Thursday. States she was seen in ED Thursday and was unable to follow up with referral.      65-year-old female with history of Graves disease presents to the emergency department with chief complaint of right leg pain and swelling.  States that her pain started just over a week ago.  This does not feel like her typical chronic sciatic pain.  She reports engorgement of the veins in her leg.  States that the leg is swollen and feels tight.  She denies any trauma or injury.  No back pain, leg weakness, numbness, tingling.  Was evaluated in the ED on 05/21/2025.  Had an ultrasound performed which was negative for DVT.  He is instructed to follow up with the PCP.  She has an appointment today but states that she came back to the emergency department due to her worsening pain.  She denies other worsening or alleviating factors.      Review of patient's allergies indicates:  No Known Allergies  Past Medical History:   Diagnosis Date    Acute colitis 6/25/2021    Graves disease     Hyperthyroidism     S/P laparoscopic cholecystectomy 6-6-2021 6/24/2021     Past Surgical History:   Procedure Laterality Date    ARTHROSCOPIC DEBRIDEMENT OF SHOULDER Left 1/31/2025    Procedure: DEBRIDEMENT, SHOULDER, ARTHROSCOPIC;  Surgeon: Manpreet Webb MD;  Location: [facility] OR;  Service: Orthopedics;  Laterality: Left;    ARTHROSCOPY OF SHOULDER WITH DECOMPRESSION OF SUBACROMIAL SPACE Left 1/31/2025    Procedure: ARTHROSCOPY, SHOULDER, WITH SUBACROMIAL SPACE DECOMPRESSION;  Surgeon: Manpreet Webb MD;  Location: [facility] OR;  Service: Orthopedics;  Laterality: Left;    ARTHROSCOPY,SHOULDER,WITH BICEPS TENODESIS Left 1/31/2025    Procedure: ARTHROSCOPY,SHOULDER,WITH BICEPS TENODESIS;  Surgeon: Manpreet Webb MD;  Location: [facility] OR;  Service: Orthopedics;  Laterality: Left;    COLONOSCOPY       COLONOSCOPY N/A 6/20/2024    Procedure: COLONOSCOPY;  Surgeon: Kolby Yao MD;  Location: [facility] ENDO;  Service: Endoscopy;  Laterality: N/A;    LAPAROSCOPIC CHOLECYSTECTOMY N/A 06/06/2021    Procedure: CHOLECYSTECTOMY, LAPAROSCOPIC;  Surgeon: Sandy Delaney MD;  [facility];  Service: General;  Laterality: N/A;    ROTATOR CUFF REPAIR Left 1/31/2025    Procedure: ARTHROSCOPY SHOULDER REPAIR, ROTATOR CUFF;  Surgeon: Manpreet Webb MD;  Location: [facility] OR;  Service: Orthopedics;  Laterality: Left;  WITH CATHETER     No family history on file.  Social History[1]  Review of Systems   Musculoskeletal:  Positive for myalgias.       Physical Exam     Initial Vitals [05/27/25 1250]   BP Pulse Resp Temp SpO2   131/61 88 20 99.5 °F (37.5 °C) 98 %      MAP       --         Physical Exam    Vitals reviewed.  Constitutional: She appears well-developed and well-nourished. She is not diaphoretic. No distress.   Chronically ill appearing, NAD    HENT:   Head: Normocephalic and atraumatic. Mouth/Throat: Oropharynx is clear and moist.   Eyes: Conjunctivae and EOM are normal. Pupils are equal, round, and reactive to light.   Neck: Neck supple.   Normal range of motion.  Cardiovascular:  Normal rate.           Pulmonary/Chest: No respiratory distress.   Abdominal: She exhibits no distension.   Musculoskeletal:         General: Normal range of motion.      Cervical back: Normal range of motion and neck supple.      Comments: Tenderness to palpation to right calf and right posterior thigh.  Varicose veins noted bilaterally. Minimal swelling to the right lower extremity compared to the left      Neurological: She is alert and oriented to person, place, and time. She has normal strength. No cranial nerve deficit or sensory deficit. GCS score is 15. GCS eye subscore is 4. GCS verbal subscore is 5. GCS motor subscore is 6.   Skin: Skin is warm and dry. Capillary refill takes less than 2 seconds.   Psychiatric:  She has a normal mood and affect. Her behavior is normal. Judgment and thought content normal.         ED Course   Procedures  Labs Reviewed   ISTAT PROCEDURE - Abnormal       Result Value    POC Glucose 131 (*)     POC BUN 18      POC Creatinine 0.7      POC Sodium 138      POC Potassium 4.1      POC Chloride 101      POC TCO2 (MEASURED) 23      POC Ionized Calcium 1.09      POC Hematocrit 36      Sample LASHONDA     ISTAT CHEM8          Imaging Results              US Lower Extremity Veins Right (Final result)  Result time 05/27/25 15:53:32      Final result by Tulio Osborne MD (05/27/25 15:53:32)                   Impression:      No evidence of deep venous thrombosis in the right lower extremity.      Electronically signed by: Tulio Osborne  Date:    05/27/2025  Time:    15:53               Narrative:    EXAMINATION:  US LOWER EXTREMITY VEINS RIGHT    CLINICAL HISTORY:  Pain in right leg    TECHNIQUE:  Duplex and color flow Doppler evaluation and graded compression of the right lower extremity veins was performed.    COMPARISON:  Ultrasound lower extremity veins right 05/21/2025    FINDINGS:  Right thigh veins: The common femoral, femoral, popliteal, and upper greater saphenous veins are patent and free of thrombus. The veins are normally compressible and have normal phasic flow and augmentation response.    Right calf veins: The visualized calf veins are patent.    Contralateral CFV: The contralateral (left) common femoral vein is patent and free of thrombus.    Miscellaneous: None                                       Medications   HYDROcodone-acetaminophen 5-325 mg per tablet 2 tablet (2 tablets Oral Given 5/27/25 1400)     Medical Decision Making  Emergent evaluation of a 65 y.o. female presenting to the emergency department complaining of right leg pain and swelling that started over a week ago. Patient is afebrile, hemodynamically stable, and non toxic appearing.   Will order ISTAT, US, pain control.      Differential diagnosis includes but isn't limited to DVT, lumbar radiculopathy, metabolic derangement.    Ultrasound without evidence of DVT.  I-STAT is unremarkable.  Mild hyperglycemia of 131.  Unclear etiology of patient's pain.  We discussed that this could be her radiculopathy.  Recommend outpatient follow-up with PCP and pain management.  Return precautions given.  All questions answered.  The patient was instructed to follow up with a primary care provider and pain management or to return to the emergency department for worsening symptoms. The treatment plan was discussed with the patient who demonstrated understanding and comfort with plan.     Risk  Prescription drug management.                                      Clinical Impression:  Final diagnoses:  [M79.604] Right leg pain (Primary)          ED Disposition Condition    Discharge Stable          ED Prescriptions    None       Follow-up Information       Follow up With Specialties Details Why Contact Info Additional Information    [facility] - Emergency Dept Emergency Medicine Go to  If symptoms worsen [address]  [facility] [id number] [phone]     [facility] Internal Medicine Schedule an appointment as soon as possible for a visit   [address] [id number] [phone] [facility] Please park in surface lot and check in at central registration desk                   [1]   Social History  Tobacco Use    Smoking status: Every Day     Types: Cigarettes    Smokeless tobacco: Never    Tobacco comments:     quit 5/2021   Substance Use Topics    Alcohol use: Never    Drug use: Never        Gogo Feliz PA-C  05/27/25 [id number]

## 2025-05-27 NOTE — ED NOTES
"Patient states pain in right leg, seen in ED Tuesday, states right leg pain from groin to feet starting either Tues or Wed. States " tabitha vee" has appointment at 1500 today, reports she can not wait, Norco 0200  "

## 2025-05-27 NOTE — DISCHARGE INSTRUCTIONS
Your ultrasound does not show evidence of a blood clot in your leg.  Your electrolytes are within normal limits.  Your blood sugar is a little bit elevated.  Follow-up with your primary doctor and pain management or return to the emergency department sooner for any new or worsening symptoms.

## 2025-05-27 NOTE — FIRST PROVIDER EVALUATION
Emergency Department TeleTriage Encounter Note      CHIEF COMPLAINT    Chief Complaint   Patient presents with    Leg Pain     Reports chronic worsening right leg pain since Thursday. States she was seen in ED Thursday and was unable to follow up with referral.        VITAL SIGNS   Initial Vitals [05/27/25 1250]   BP Pulse Resp Temp SpO2   131/61 88 20 99.5 °F (37.5 °C) 98 %      MAP       --            ALLERGIES    Review of patient's allergies indicates:  No Known Allergies    PROVIDER TRIAGE NOTE  Verbal consent for the teletriage evaluation was given by the patient at the start of the evaluation.  All efforts will be made to maintain patient's privacy during the evaluation.      This is a teletriage evaluation of a 65 y.o. female presenting to the ED with c/o chronic right leg pain; seen in the ED 5 days ago with a negative w/u.  Unable to f/u.  Has RX pain meds but did not take any today. Limited physical exam via telehealth: The patient is awake, alert, answering questions appropriately and is not in respiratory distress.  As the Teletriage provider, I performed an initial assessment and ordered appropriate labs and imaging studies, if any, to facilitate the patient's care once placed in the ED. Once a room is available, care and a full evaluation will be completed by an alternate ED provider.  Any additional orders and the final disposition will be determined by that provider.  All imaging and labs will not be followed-up by the Teletriage Team, including myself.          ORDERS  Labs Reviewed - No data to display    ED Orders (720h ago, onward)      None              Virtual Visit Note: The provider triage portion of this emergency department evaluation and documentation was performed via [employer], a HIPAA-compliant telemedicine application, in concert with a tele-presenter in the room. A face to face patient evaluation with one of my colleagues will occur once the patient is placed in an emergency  department room.      DISCLAIMER: This note was prepared with FL3XX voice recognition transcription software. Garbled syntax, mangled pronouns, and other bizarre constructions may be attributed to that software system.

## 2025-06-04 ENCOUNTER — TELEPHONE (OUTPATIENT)
Dept: INTERNAL MEDICINE | Facility: CLINIC | Age: 65
End: 2025-06-04
Payer: MEDICARE

## 2025-06-05 ENCOUNTER — RESULTS FOLLOW-UP (OUTPATIENT)
Dept: INTERNAL MEDICINE | Facility: CLINIC | Age: 65
End: 2025-06-05

## 2025-06-05 ENCOUNTER — OFFICE VISIT (OUTPATIENT)
Dept: INTERNAL MEDICINE | Facility: CLINIC | Age: 65
End: 2025-06-05
Payer: MEDICARE

## 2025-06-05 ENCOUNTER — HOSPITAL ENCOUNTER (OUTPATIENT)
Dept: RADIOLOGY | Facility: HOSPITAL | Age: 65
Discharge: HOME | End: 2025-06-05
Attending: STUDENT IN AN ORGANIZED HEALTH CARE EDUCATION/TRAINING PROGRAM
Payer: MEDICARE

## 2025-06-05 VITALS
WEIGHT: 177.38 LBS | HEART RATE: 83 BPM | SYSTOLIC BLOOD PRESSURE: 138 MMHG | BODY MASS INDEX: 31.43 KG/M2 | DIASTOLIC BLOOD PRESSURE: 70 MMHG | OXYGEN SATURATION: 98 % | HEIGHT: 63 IN

## 2025-06-05 DIAGNOSIS — M25.469 SUPRAPATELLAR EFFUSION OF KNEE: Primary | ICD-10-CM

## 2025-06-05 DIAGNOSIS — M25.561 CHRONIC PAIN OF RIGHT KNEE: ICD-10-CM

## 2025-06-05 DIAGNOSIS — E05.00 GRAVES DISEASE: ICD-10-CM

## 2025-06-05 DIAGNOSIS — G89.29 CHRONIC PAIN OF RIGHT KNEE: ICD-10-CM

## 2025-06-05 DIAGNOSIS — E11.9 TYPE 2 DIABETES MELLITUS WITHOUT COMPLICATION, WITHOUT LONG-TERM CURRENT USE OF INSULIN: ICD-10-CM

## 2025-06-05 DIAGNOSIS — D03.61 MELANOMA IN SITU OF RIGHT UPPER LIMB, INCLUDING SHOULDER: ICD-10-CM

## 2025-06-05 DIAGNOSIS — Z76.89 ENCOUNTER TO ESTABLISH CARE: Primary | ICD-10-CM

## 2025-06-05 DIAGNOSIS — I10 ESSENTIAL HYPERTENSION: ICD-10-CM

## 2025-06-05 DIAGNOSIS — F41.9 ANXIETY DISORDER, UNSPECIFIED TYPE: ICD-10-CM

## 2025-06-05 DIAGNOSIS — Z12.31 ENCOUNTER FOR SCREENING MAMMOGRAM FOR MALIGNANT NEOPLASM OF BREAST: ICD-10-CM

## 2025-06-05 DIAGNOSIS — R20.0 NUMBNESS AND TINGLING OF BOTH FEET: ICD-10-CM

## 2025-06-05 DIAGNOSIS — R20.2 NUMBNESS AND TINGLING OF BOTH FEET: ICD-10-CM

## 2025-06-05 DIAGNOSIS — I87.2 VENOUS INSUFFICIENCY: ICD-10-CM

## 2025-06-05 PROCEDURE — 73562 X-RAY EXAM OF KNEE 3: CPT | Mod: TC,RT

## 2025-06-05 PROCEDURE — 99999 PR PBB SHADOW E&M-EST. PATIENT-LVL V: CPT | Mod: PBBFAC,,, | Performed by: STUDENT IN AN ORGANIZED HEALTH CARE EDUCATION/TRAINING PROGRAM

## 2025-06-05 PROCEDURE — 73562 X-RAY EXAM OF KNEE 3: CPT | Mod: 26,RT,, | Performed by: RADIOLOGY

## 2025-06-06 DIAGNOSIS — I87.2 VENOUS INSUFFICIENCY: Primary | ICD-10-CM

## 2025-06-09 ENCOUNTER — TELEPHONE (OUTPATIENT)
Dept: INTERNAL MEDICINE | Facility: CLINIC | Age: 65
End: 2025-06-09
Payer: MEDICARE

## 2025-06-09 NOTE — TELEPHONE ENCOUNTER
----- Message from Elina Chamorro MD sent at 6/6/2025 12:02 PM CDT -----  A1c worse from before but still within goal  Reported compliance with Jardiance. Emphasize on diet control. Recheck A1c in 3 months.   Was on glimepride and metformin before, stopped by previous provider: she is not sure why, probably well controlled DM  LDL within goal < 100. Continue statin  Called and informed results.    Please make an appointment with me in 3 months    ----- Message -----  From: Lab, Background User  Sent: 6/5/2025  10:09 PM CDT  To: Elina Chamorro MD

## 2025-06-11 DIAGNOSIS — E11.9 TYPE 2 DIABETES MELLITUS WITHOUT COMPLICATION: ICD-10-CM

## 2025-06-11 DIAGNOSIS — E11.9 TYPE 2 DIABETES MELLITUS WITHOUT COMPLICATION, UNSPECIFIED WHETHER LONG TERM INSULIN USE: ICD-10-CM

## 2025-06-12 ENCOUNTER — OFFICE VISIT (OUTPATIENT)
Dept: ORTHOPEDICS | Facility: CLINIC | Age: 65
End: 2025-06-12
Payer: MEDICARE

## 2025-06-12 DIAGNOSIS — M17.11 PRIMARY OSTEOARTHRITIS OF RIGHT KNEE: Primary | ICD-10-CM

## 2025-06-12 DIAGNOSIS — M25.469 SUPRAPATELLAR EFFUSION OF KNEE: ICD-10-CM

## 2025-06-12 PROCEDURE — 99999 PR PBB SHADOW E&M-EST. PATIENT-LVL III: CPT | Mod: PBBFAC,,, | Performed by: PHYSICIAN ASSISTANT

## 2025-06-12 RX ORDER — LIDOCAINE HYDROCHLORIDE 10 MG/ML
4 INJECTION, SOLUTION INFILTRATION; PERINEURAL
Status: DISCONTINUED | OUTPATIENT
Start: 2025-06-12 | End: 2025-06-12 | Stop reason: HOSPADM

## 2025-06-12 RX ORDER — TRIAMCINOLONE ACETONIDE 40 MG/ML
40 INJECTION, SUSPENSION INTRA-ARTICULAR; INTRAMUSCULAR
Status: DISCONTINUED | OUTPATIENT
Start: 2025-06-12 | End: 2025-06-12 | Stop reason: HOSPADM

## 2025-06-12 RX ADMIN — TRIAMCINOLONE ACETONIDE 40 MG: 40 INJECTION, SUSPENSION INTRA-ARTICULAR; INTRAMUSCULAR at 04:06

## 2025-06-12 RX ADMIN — LIDOCAINE HYDROCHLORIDE 4 ML: 10 INJECTION, SOLUTION INFILTRATION; PERINEURAL at 04:06

## 2025-06-12 NOTE — PROGRESS NOTES
[facility]  Orthopedic Surgery  Clinic Note      Subjective:   History of Present Illness    CHIEF COMPLAINT:  Patient presents today for right knee pain and swelling.    RIGHT KNEE PAIN:  She reports right knee pain and swelling for approximately 2 months. The pain is localized around the kneecap and various areas of the knee, with worsening upon increased walking. She experiences a burning sensation under the knee skin and reports difficulty sleeping, requiring a pillow between her legs for comfort. While she denies visible swelling, she describes a sensation of fluid buildup in the knee. She has difficulty with mobility, particularly when navigating obstacles, requiring her to lead with her unaffected foot and drag the affected foot.    RIGHT CALF PAIN:  She reports severe muscle cramps in her right calf that began 2 months ago, extending up her leg and occurring in various locations.    She went to the ER on 5/21/2025 then subsequently again on 5/27/2025 and had neg US on both visits. XR obtained on 6/5/2025 showed DJD with joint effusion.        MEDICAL HISTORY:  She has diabetes and arthritis affecting her back and neck with significant pain.    MEDICATIONS:  She takes hydrocodone 10 for pain management but reports experiencing breakthrough pain.    SOCIAL HISTORY:  She is currently disabled.      ROS:  General: -fever, -chills  Musculoskeletal: +joint pain, -muscle pain, +leg cramping, +muscle cramps, +joint swelling, +pain with movement, +difficulty walking, +burning sensation, +neck pain, +back pain  Skin: -rash, -lesion  Neurological: -numbness, -tingling        Medications: I have reviewed medication list in the chart at the time of this encounter.     Review of patient's allergies indicates:  No Known Allergies   Past Medical History:   Diagnosis Date    Acute colitis 6/25/2021    Graves disease     Hyperthyroidism     S/P laparoscopic cholecystectomy 6-6-2021 6/24/2021     Past Surgical  History:   Procedure Laterality Date    ARTHROSCOPIC DEBRIDEMENT OF SHOULDER Left 1/31/2025    Procedure: DEBRIDEMENT, SHOULDER, ARTHROSCOPIC;  Surgeon: Manpreet Webb MD;  Location: [facility] OR;  Service: Orthopedics;  Laterality: Left;    ARTHROSCOPY OF SHOULDER WITH DECOMPRESSION OF SUBACROMIAL SPACE Left 1/31/2025    Procedure: ARTHROSCOPY, SHOULDER, WITH SUBACROMIAL SPACE DECOMPRESSION;  Surgeon: Manpreet Webb MD;  Location: [facility] OR;  Service: Orthopedics;  Laterality: Left;    ARTHROSCOPY,SHOULDER,WITH BICEPS TENODESIS Left 1/31/2025    Procedure: ARTHROSCOPY,SHOULDER,WITH BICEPS TENODESIS;  Surgeon: Manpreet Webb MD;  Location: [facility] OR;  Service: Orthopedics;  Laterality: Left;    COLONOSCOPY      COLONOSCOPY N/A 6/20/2024    Procedure: COLONOSCOPY;  Surgeon: Kolby Yao MD;  Location: [facility] ENDO;  Service: Endoscopy;  Laterality: N/A;    LAPAROSCOPIC CHOLECYSTECTOMY N/A 06/06/2021    Procedure: CHOLECYSTECTOMY, LAPAROSCOPIC;  Surgeon: Sandy Delaney MD;  [facility];  Service: General;  Laterality: N/A;    ROTATOR CUFF REPAIR Left 1/31/2025    Procedure: ARTHROSCOPY SHOULDER REPAIR, ROTATOR CUFF;  Surgeon: Manpreet Webb MD;  Location: [facility] OR;  Service: Orthopedics;  Laterality: Left;  WITH CATHETER       Objective:     Physical Exam  Musculoskeletal:      Right knee: Effusion and crepitus present. No erythema, ecchymosis or bony tenderness. No tenderness. Normal alignment and normal patellar mobility.      Left knee: Crepitus present. No swelling, deformity, effusion, ecchymosis or bony tenderness. No tenderness. Normal alignment and normal patellar mobility.      Comments: Bilateral varicose veins. Right >>> left.  No calf tenderness, unilateral leg swelling, or peripheral edema.   No erythema, induration.       Skin:     Findings: No bruising, ecchymosis, erythema, signs of injury or wound.          Right Knee Exam     Tenderness   The patient is experiencing no  tenderness.     Range of Motion   Extension:  0   Flexion:  120     Other   Erythema: absent  Scars: absent  Sensation: normal  Swelling: none  Effusion: effusion present      Left Knee Exam     Tenderness   The patient is experiencing no tenderness.     Range of Motion   Extension:  0   Flexion:  120     Other   Erythema: absent  Scars: absent  Sensation: normal  Swelling: none  Effusion: no effusion present             Imaging:  I have independently interpreted and reviewed XR of R knee obtained on 6/5/2025 and US of RLE veins on 5/21 and subsequently on 5/27 without signs of DVT with patient.      Assessment:       1. Primary osteoarthritis of right knee    2. Suprapatellar effusion of knee       Plan:       Orders Placed This Encounter    Large Joint Aspiration/Injection: R knee    Ambulatory Referral/Consult to Physical Therapy        Assessment & Plan    IMPRESSION:  - Diagnosed advance knee arthritis, KL grade 4, based on XR showing bone-on-bone contact and significant osteophytes.  - Identified acute inflammatory flare causing synovial fluid buildup in knee joint.  - Performed arthrocentesis, removing 27 mL of synovial fluid from knee. Administered intra-articular corticosteroid injection to reduce inflammation. See procedure note.   - Considered gel injections as potential future treatment if steroid injection ineffective.  - Noted knee replacement may be necessary in future if conservative treatments fail.    PATIENT EDUCATION:  - Explained XR findings, showing bone-on-bone contact and osteophytes indicative of significant arthritis.  - Discussed that arthritis can cause significant pain and inflammation.  - Explained that inactivity can progress arthritis.  - Explained that steroid injection targets joint inflammation, not entering bloodstream, but may temporarily affect glucose levels.    ACTION ITEMS/LIFESTYLE:  - Patient to wear ACE wrap for rest of day and continue wearing for next 5-7 days.  -  Recommend light activity for the new few days.   - Continue OTC medication and current pain regiment for chronic pain.    REFERRAL:  - Start PT for knee OA.     FOLLOW UP:  - Follow up in 3 months or sooner if needed.   - Contact office sooner if not improving or experiencing issues.         Future Appointments   Date Time Provider Department Center   6/24/2025  1:00 PM Manpreet Webb MD [facility] SPORTS [place]   8/12/2025  8:00 AM Elina Chamorro MD [facility] IM [facility]   8/15/2025  2:00 PM VASCULAR, LAB [facility] VASCLAB [facility]   8/15/2025  3:00 PM Catina Soni MD [facility] VASCSUR [facility]   9/15/2025  4:30 PM Yesika Tovar PA-C [facility] ORTHO [facility]       Yesika Tovar PA-C  Orthopedic Surgery  [facility]

## 2025-06-12 NOTE — PATIENT INSTRUCTIONS
- Diagnosed advance knee arthritis based on XR showing bone-on-bone contact and significant osteophytes.  - Identified acute inflammatory flare causing synovial fluid buildup in knee joint.  - Performed arthrocentesis, removing 27 mL of synovial fluid from knee.  - Administered intra-articular corticosteroid injection to reduce inflammation.  - Considered gel injections as potential future treatment if steroid injection ineffective.  - Noted knee replacement may be necessary in future if conservative treatments fail.    PATIENT EDUCATION:  - Explained XR findings, showing bone-on-bone contact and osteophytes indicative of significant arthritis.  - Discussed that arthritis can cause significant pain and inflammation.  - Explained that inactivity can progress arthritis.  - Explained that steroid injection targets joint inflammation, not entering bloodstream, but may temporarily affect glucose levels.    ACTION ITEMS/LIFESTYLE:  - Patient to wear ACE wrap for rest of day and continue wearing for next 5-7 days.  - Recommend taking it easy for next couple of days.    FOLLOW UP:  Future Appointments   Date Time Provider Department Center   6/12/2025  4:30 PM Yesika Tovar PA-C [facility] ORTHO [facility]   6/24/2025  1:00 PM Manpreet Webb MD [facility] SPORTS [place]   8/12/2025  8:00 AM Elina Chamorro MD [facility] IM [facility]   8/15/2025  2:00 PM VASCULAR, LAB [facility]   8/15/2025  3:00 PM Catina Soni MD [facility] CL[facility]

## 2025-06-12 NOTE — PROCEDURES
Large Joint Aspiration/Injection: R knee    Date/Time: 6/12/2025 4:30 PM    Performed by: Yesika Tovar PA-C  Authorized by: Yesika Tovar PA-C    Consent Done?:  Yes (Verbal)  Indications:  Arthritis and pain  Site marked: the procedure site was marked    Prep: patient was prepped and draped in usual sterile fashion      Local anesthesia used?: Yes      Details:  Needle Size:  18 G  Ultrasonic Guidance for needle placement?: No    Approach:  Lateral  Location:  Knee  Site:  R knee  Medications:  4 mL LIDOcaine HCL 10 mg/ml (1%) 10 mg/mL (1 %); 40 mg triamcinolone acetonide 40 mg/mL  Aspirate amount (mL):  27  Aspirate:  Clear and yellow  Patient tolerance:  Patient tolerated the procedure well with no immediate complications

## 2025-06-23 DIAGNOSIS — Z00.00 ENCOUNTER FOR MEDICARE ANNUAL WELLNESS EXAM: ICD-10-CM

## 2025-06-27 NOTE — TELEPHONE ENCOUNTER
No care due was identified.  [facility] Embedded Care Due Messages. Reference number: [id number].   6/27/2025 9:24:08 AM CDT

## 2025-06-27 NOTE — TELEPHONE ENCOUNTER
Refill Routing Note   Medication(s) are not appropriate for processing by [facility] for the following reason(s):        No active prescription written by provider    [facility] action(s):  Defer               Appointments  past 12m or future 3m with PCP    Date Provider   Last Visit   6/5/2025 Elina Chamorro MD   Next Visit   8/12/2025 Elina Chamorro MD   ED visits in past 90 days: 2        Note composed:6:03 PM 06/27/2025

## 2025-06-27 NOTE — TELEPHONE ENCOUNTER
Pt stating she is has pain around the kneecap and various areas of the knee, gets worst with increased walking. She experiences a burning sensation under the knee  . She had the same issue previously .

## 2025-06-29 RX ORDER — EMPAGLIFLOZIN 25 MG/1
25 TABLET, FILM COATED ORAL DAILY
Qty: 90 TABLET | Refills: 1 | Status: SHIPPED | OUTPATIENT
Start: 2025-06-29

## 2025-06-30 ENCOUNTER — TELEPHONE (OUTPATIENT)
Dept: INTERNAL MEDICINE | Facility: CLINIC | Age: 65
End: 2025-06-30
Payer: MEDICARE

## 2025-06-30 NOTE — TELEPHONE ENCOUNTER
Pt denies having a fall or any injury to cause the pain. She has not tried any otc remedies. Pt declined to schedule an appointment at the moment.

## 2025-06-30 NOTE — TELEPHONE ENCOUNTER
Copied from CRM #[id number]. Topic: General Inquiry - Patient Advice  >> Jun 30, 2025 11:28 AM Desiree wrote:  Would like to receive medical advice.    Symptoms (please be specific):  pain in left knee and lower back pain    How long has the patient had these symptoms:  2 months    Would they like a call back or a response via [employer]:  call back at [phone]    Additional information:  Pt did not want to schedule an appt.  She wants advise from Dr.. Chamorro on what she should do. She said her left foot is dragging when walking.

## 2025-07-02 NOTE — TELEPHONE ENCOUNTER
Called pt.   Pain in right knee: discussed severe arthritis noted on Xray   She now has pain in left knee too. She says it swells up sometimes. No redness, fever or chills.   She has medications from ER: advised to continue those ( robaxin)   Advised to reach out to Yesika Tovar to be seen sooner or schedule appointment with me.  If notices swelling with redness/fever chills then should go to ER and evaluate for septic arthritis if unable to see ortho outpatient.

## 2025-07-03 ENCOUNTER — TELEPHONE (OUTPATIENT)
Dept: INTERNAL MEDICINE | Facility: CLINIC | Age: 65
End: 2025-07-03
Payer: MEDICARE

## 2025-07-03 NOTE — TELEPHONE ENCOUNTER
Copied from CRM #[id number]. Topic: General Inquiry - Patient Advice  >> Jul 3, 2025 11:43 AM Sakina wrote:  .1MEDICALADVICE     Patient is calling for Medical Advice regarding:patient called to discuss pain medication for pain RX.     How long has patient had these symptoms:    Pharmacy name and phone#:    Patient wants a call back or [facility], provide patient's call back phone number:call [phone] (M)    Comments:    Please advise patient replies from provider may take up to 48 hours.

## 2025-07-03 NOTE — TELEPHONE ENCOUNTER
Spoke with pt she states she is in need of pain medication due to her legs She is requesting that she get them today before the holiday.

## 2025-07-16 ENCOUNTER — OFFICE VISIT (OUTPATIENT)
Dept: ORTHOPEDICS | Facility: CLINIC | Age: 65
End: 2025-07-16
Payer: MEDICAID

## 2025-07-16 ENCOUNTER — HOSPITAL ENCOUNTER (OUTPATIENT)
Dept: RADIOLOGY | Facility: HOSPITAL | Age: 65
Discharge: HOME | End: 2025-07-16
Attending: PHYSICIAN ASSISTANT
Payer: MEDICAID

## 2025-07-16 DIAGNOSIS — M17.12 PRIMARY OSTEOARTHRITIS OF LEFT KNEE: Primary | ICD-10-CM

## 2025-07-16 DIAGNOSIS — M25.562 LEFT KNEE PAIN, UNSPECIFIED CHRONICITY: ICD-10-CM

## 2025-07-16 PROCEDURE — 99999PBSHW PR PBB SHADOW TECHNICAL ONLY FILED TO HB: Mod: JZ,PBBFAC,,

## 2025-07-16 PROCEDURE — 20610 DRAIN/INJ JOINT/BURSA W/O US: CPT | Mod: PBBFAC,LT | Performed by: PHYSICIAN ASSISTANT

## 2025-07-16 PROCEDURE — 99999 PR PBB SHADOW E&M-EST. PATIENT-LVL II: CPT | Mod: PBBFAC,,, | Performed by: PHYSICIAN ASSISTANT

## 2025-07-16 PROCEDURE — 1159F MED LIST DOCD IN RCRD: CPT | Mod: CPTII,,, | Performed by: PHYSICIAN ASSISTANT

## 2025-07-16 PROCEDURE — 73560 X-RAY EXAM OF KNEE 1 OR 2: CPT | Mod: TC,RT

## 2025-07-16 PROCEDURE — 73562 X-RAY EXAM OF KNEE 3: CPT | Mod: 26,LT,, | Performed by: RADIOLOGY

## 2025-07-16 PROCEDURE — 99213 OFFICE O/P EST LOW 20 MIN: CPT | Mod: S$PBB,25,, | Performed by: PHYSICIAN ASSISTANT

## 2025-07-16 PROCEDURE — 20610 DRAIN/INJ JOINT/BURSA W/O US: CPT | Mod: S$PBB,LT,, | Performed by: PHYSICIAN ASSISTANT

## 2025-07-16 PROCEDURE — 3044F HG A1C LEVEL LT 7.0%: CPT | Mod: CPTII,,, | Performed by: PHYSICIAN ASSISTANT

## 2025-07-16 PROCEDURE — 99212 OFFICE O/P EST SF 10 MIN: CPT | Mod: PBBFAC,25 | Performed by: PHYSICIAN ASSISTANT

## 2025-07-16 PROCEDURE — 73560 X-RAY EXAM OF KNEE 1 OR 2: CPT | Mod: 26,RT,, | Performed by: RADIOLOGY

## 2025-07-16 PROCEDURE — 1160F RVW MEDS BY RX/DR IN RCRD: CPT | Mod: CPTII,,, | Performed by: PHYSICIAN ASSISTANT

## 2025-07-16 RX ADMIN — METHYLPREDNISOLONE ACETATE 40 MG: 40 INJECTION, SUSPENSION INTRA-ARTICULAR; INTRALESIONAL; INTRAMUSCULAR; SOFT TISSUE at 04:07

## 2025-07-16 RX ADMIN — LIDOCAINE HYDROCHLORIDE 4 ML: 10 INJECTION, SOLUTION INFILTRATION; PERINEURAL at 04:07

## 2025-07-16 NOTE — PROGRESS NOTES
[facility]  Orthopedic Surgery  Clinic Note      Subjective:   History of Present Illness    CHIEF COMPLAINT:  Patient presents today for left knee pain.    KNEE PAIN:  Left knee pain started 3 weeks ago without precipitating injury. Pain is located above and on sides of knee, rated 8/10, described as shooting and muscular in nature. Right knee was previously drained one month ago and has improved since, but she still has minor pain there.    MEDICAL HISTORY:  She has history of prolonged standing due to occupational requirements and multiple falls during her younger years, potentially contributing to musculoskeletal issues.    CURRENT MEDICATIONS:  She takes Celecoxib every 12 hours for arthritic management, prescribed by Dr. Leiva in January.      ROS:  Musculoskeletal: +joint pain, -muscle pain, +difficulty walking, +pain with movement, +shooting pain sensation, +back pain, +difficulty standing up        Medications: I have reviewed medication list in the chart at the time of this encounter.     Review of patient's allergies indicates:  No Known Allergies   Past Medical History:   Diagnosis Date    Acute colitis 6/25/2021    Graves disease     Hyperthyroidism     S/P laparoscopic cholecystectomy 6-6-2021 6/24/2021     Past Surgical History:   Procedure Laterality Date    ARTHROSCOPIC DEBRIDEMENT OF SHOULDER Left 1/31/2025    Procedure: DEBRIDEMENT, SHOULDER, ARTHROSCOPIC;  Surgeon: Manpreet Webb MD;  Location: [facility] OR;  Service: Orthopedics;  Laterality: Left;    ARTHROSCOPY OF SHOULDER WITH DECOMPRESSION OF SUBACROMIAL SPACE Left 1/31/2025    Procedure: ARTHROSCOPY, SHOULDER, WITH SUBACROMIAL SPACE DECOMPRESSION;  Surgeon: Manpreet Webb MD;  Location: [facility] OR;  Service: Orthopedics;  Laterality: Left;    ARTHROSCOPY,SHOULDER,WITH BICEPS TENODESIS Left 1/31/2025    Procedure: ARTHROSCOPY,SHOULDER,WITH BICEPS TENODESIS;  Surgeon: Manpreet Webb MD;  Location: [facility] OR;  Service:  Orthopedics;  Laterality: Left;    COLONOSCOPY      COLONOSCOPY N/A 6/20/2024    Procedure: COLONOSCOPY;  Surgeon: Kolby Yao MD;  Location: [facility];  Service: Endoscopy;  Laterality: N/A;    LAPAROSCOPIC CHOLECYSTECTOMY N/A 06/06/2021    Procedure: CHOLECYSTECTOMY, LAPAROSCOPIC;  Surgeon: Sandy Delaney MD;  [facility];  Service: General;  Laterality: N/A;    ROTATOR CUFF REPAIR Left 1/31/2025    Procedure: ARTHROSCOPY SHOULDER REPAIR, ROTATOR CUFF;  Surgeon: Manpreet Webb MD;  Location: [facility] OR;  Service: Orthopedics;  Laterality: Left;  WITH CATHETER       Objective:     Physical Exam  Musculoskeletal:      Right knee: Crepitus present. No swelling, deformity, effusion, erythema or bony tenderness. Normal range of motion. No tenderness. Normal alignment and normal patellar mobility.      Left knee: Crepitus present. No swelling, deformity, effusion, erythema or bony tenderness. Tenderness (generalized) present. Normal alignment and normal patellar mobility.          Right Ankle Exam     Muscle Strength   Dorsiflexion:  5/5  Plantar flexion:  5/5  Anterior tibial:  5/5  Posterior tibial:  5/5  Gastrocsoleus:  5/5  Peroneal muscle:  5/5       Left Ankle Exam     Muscle Strength   Dorsiflexion:  5/5   Plantar flexion:  5/5   Anterior tibial:  5/5   Posterior tibial:  5/5  Gastrocsoleus:  5/5  Peroneal muscle:  5/5      Right Knee Exam     Tenderness   The patient is experiencing no tenderness.     Range of Motion   Extension:  0   Flexion:  130     Other   Erythema: absent  Scars: absent  Sensation: normal  Swelling: none  Effusion: no effusion present      Left Knee Exam     Range of Motion   Extension:  0   Flexion:  130     Other   Erythema: absent  Scars: absent  Sensation: normal  Swelling: none  Effusion: no effusion present      Right Hip Exam     Muscle Strength   Abduction: 5/5   Adduction: 5/5   Flexion: 5/5       Left Hip Exam     Muscle Strength   Abduction: 5/5    Adduction: 5/5   Flexion: 5/5       Back Exam     Muscle Strength   Right Quadriceps:  5/5   Left Quadriceps:  5/5   Right Hamstrings:  5/5   Left Hamstrings:  5/5            Imaging:  I have independently interpreted and reviewed L knee XR obtained today in clinic with patient.      Assessment:       1. Primary osteoarthritis of left knee    2. Left knee pain, unspecified chronicity       Plan:       Orders Placed This Encounter    Large Joint Aspiration/Injection: L knee    X-ray Knee Ortho Left (XPD)        Assessment & Plan    IMPRESSION:  - Assessed left knee pain, likely due to arthritic flare-up based on XR findings showing joint space narrowing, subchondral sclerosis, and bone spurs. Provacative testing negative. XR without effusion.  - Attempted aspiration of left knee joint at patient's request, but no fluid was obtained.  - Administered intra-articular steroid injection to left knee for pain management. See note.  - Recommend conservative management with anti-inflammatory medication, knee brace, and physical therapy for osteoarthritis.  - Explained XR findings of arthritis in both knees, with left knee approaching severity of right knee.    PATIENT EDUCATION:  - Clarified that arthritis can cause bone-on-bone rubbing, leading to pain and inflammation.    ACTION ITEMS/LIFESTYLE:  - Patient to wear provided knee brace when experiencing pain, especially for the next couple of days.  - Recommend using stationary bike and weights at home for exercise.  - Start home exercise. HEP provided.     MEDICATIONS:  - Continued Celebrex (celecoxib) every 12 hours as needed for pain and inflammation.    REFERRALS:  - Consider physical therapy for knee pain and overall mobility, pending patient's decision to proceed.    FOLLOW UP:  - Follow up in 8 weeks for knee evaluation or sooner if needed.         Future Appointments   Date Time Provider Department Center   7/30/2025  3:00 PM Manpreet Webb MD [facility] SPORTS  [place]   8/12/2025  8:00 AM Elina Chamorro MD [facility] IM [facility]   8/15/2025  2:00 PM VASCULAR, LAB [facility] VASCLAB [facility]   8/15/2025  3:00 PM Catina Soni MD [facility] VASCSUR [facility]   8/28/2025 10:00 AM Ramon Johnson NP [facility] IM [facility]   9/15/2025  4:30 PM Yesika Tovar PA-C [facility] ORTHO [facility] Orlorena Tovar PA-C  Orthopedic Surgery  [facility]

## 2025-07-16 NOTE — PROCEDURES
Large Joint Aspiration/Injection: L knee    Date/Time: 7/16/2025 4:00 PM    Performed by: Yesika Tovar PA-C  Authorized by: Yesika Tovar PA-C    Consent Done?:  Yes (Verbal)  Indications:  Arthritis and pain  Site marked: the procedure site was marked    Prep: patient was prepped and draped in usual sterile fashion      Local anesthesia used?: Yes    Anesthesia:  Local infiltration    Details:  Needle Size:  18 G  Ultrasonic Guidance for needle placement?: No    Approach:  Lateral  Location:  Knee  Site:  L knee  Medications:  4 mL LIDOcaine HCL 10 mg/ml (1%) 10 mg/mL (1 %); 40 mg methylPREDNISolone acetate 40 mg/mL  Patient tolerance:  Patient tolerated the procedure well with no immediate complications

## 2025-07-17 RX ORDER — METHYLPREDNISOLONE ACETATE 40 MG/ML
40 INJECTION, SUSPENSION INTRA-ARTICULAR; INTRALESIONAL; INTRAMUSCULAR; SOFT TISSUE
Status: DISCONTINUED | OUTPATIENT
Start: 2025-07-16 | End: 2025-07-17 | Stop reason: HOSPADM

## 2025-07-17 RX ORDER — LIDOCAINE HYDROCHLORIDE 10 MG/ML
4 INJECTION, SOLUTION INFILTRATION; PERINEURAL
Status: DISCONTINUED | OUTPATIENT
Start: 2025-07-16 | End: 2025-07-17 | Stop reason: HOSPADM

## 2025-07-31 ENCOUNTER — TELEPHONE (OUTPATIENT)
Dept: PHARMACY | Facility: CLINIC | Age: 65
End: 2025-07-31
Payer: MEDICARE

## 2025-07-31 DIAGNOSIS — S46.012A TRAUMATIC COMPLETE TEAR OF LEFT ROTATOR CUFF, INITIAL ENCOUNTER: ICD-10-CM

## 2025-07-31 RX ORDER — CELECOXIB 200 MG/1
200 CAPSULE ORAL EVERY 12 HOURS PRN
Qty: 60 CAPSULE | Refills: 0 | Status: SHIPPED | OUTPATIENT
Start: 2025-07-31

## 2025-07-31 NOTE — TELEPHONE ENCOUNTER
[facility]/Population Health Chart Review & Patient Outreach Details For Medication Adherence Project    Reason for Outreach Encounter: 3rd Party payor non-compliance report ([employer], [employer], [employer], etc)  2.  Patient Outreach Method: Reviewed patient chart   3.   Medication in question:    Diabetes Medications              JARDIANCE 25 mg tablet Take 1 tablet (25 mg total) by mouth once daily.                 jardiance  last filled  6/25 for 90 day supply      4.  Reviewed and or Updates Made To: Patient Chart  5. Outreach Outcomes and/or actions taken: Patient filled medication and is on track to be adherent  Additional Notes:

## 2025-08-19 ENCOUNTER — TELEPHONE (OUTPATIENT)
Dept: INTERNAL MEDICINE | Facility: CLINIC | Age: 65
End: 2025-08-19
Payer: MEDICARE

## 2025-08-20 ENCOUNTER — HOSPITAL ENCOUNTER (OUTPATIENT)
Dept: RADIOLOGY | Facility: HOSPITAL | Age: 65
Discharge: HOME | End: 2025-08-20
Attending: STUDENT IN AN ORGANIZED HEALTH CARE EDUCATION/TRAINING PROGRAM
Payer: MEDICARE

## 2025-08-20 ENCOUNTER — OFFICE VISIT (OUTPATIENT)
Dept: INTERNAL MEDICINE | Facility: CLINIC | Age: 65
End: 2025-08-20
Payer: MEDICARE

## 2025-08-20 VITALS
SYSTOLIC BLOOD PRESSURE: 110 MMHG | OXYGEN SATURATION: 98 % | BODY MASS INDEX: 29.75 KG/M2 | WEIGHT: 167.88 LBS | DIASTOLIC BLOOD PRESSURE: 70 MMHG | HEIGHT: 63 IN | HEART RATE: 87 BPM

## 2025-08-20 DIAGNOSIS — G89.29 CHRONIC PAIN OF RIGHT KNEE: Chronic | ICD-10-CM

## 2025-08-20 DIAGNOSIS — I87.2 VENOUS INSUFFICIENCY: ICD-10-CM

## 2025-08-20 DIAGNOSIS — F41.9 ANXIETY DISORDER, UNSPECIFIED TYPE: ICD-10-CM

## 2025-08-20 DIAGNOSIS — Z12.4 ENCOUNTER FOR SCREENING FOR MALIGNANT NEOPLASM OF CERVIX: ICD-10-CM

## 2025-08-20 DIAGNOSIS — M25.511 ACUTE PAIN OF RIGHT SHOULDER: ICD-10-CM

## 2025-08-20 DIAGNOSIS — E05.00 GRAVES DISEASE: ICD-10-CM

## 2025-08-20 DIAGNOSIS — Z12.31 ENCOUNTER FOR SCREENING MAMMOGRAM FOR MALIGNANT NEOPLASM OF BREAST: ICD-10-CM

## 2025-08-20 DIAGNOSIS — M25.561 CHRONIC PAIN OF RIGHT KNEE: Chronic | ICD-10-CM

## 2025-08-20 DIAGNOSIS — I10 ESSENTIAL HYPERTENSION: Chronic | ICD-10-CM

## 2025-08-20 DIAGNOSIS — E11.9 TYPE 2 DIABETES MELLITUS WITHOUT COMPLICATION, WITHOUT LONG-TERM CURRENT USE OF INSULIN: Primary | ICD-10-CM

## 2025-08-20 DIAGNOSIS — G89.4 CHRONIC PAIN SYNDROME: ICD-10-CM

## 2025-08-20 LAB
ALBUMIN/CREAT UR: 23 UG/MG
CREAT UR-MCNC: 126 MG/DL (ref 15–325)
MICROALBUMIN UR-MCNC: 29 UG/ML

## 2025-08-20 PROCEDURE — 73030 X-RAY EXAM OF SHOULDER: CPT | Mod: 26,HCNC,RT, | Performed by: RADIOLOGY

## 2025-08-20 PROCEDURE — 82043 UR ALBUMIN QUANTITATIVE: CPT | Mod: HCNC | Performed by: STUDENT IN AN ORGANIZED HEALTH CARE EDUCATION/TRAINING PROGRAM

## 2025-08-20 PROCEDURE — 73030 X-RAY EXAM OF SHOULDER: CPT | Mod: TC,HCNC,RT

## 2025-08-20 PROCEDURE — 99999 PR PBB SHADOW E&M-EST. PATIENT-LVL V: CPT | Mod: PBBFAC,HCNC,, | Performed by: STUDENT IN AN ORGANIZED HEALTH CARE EDUCATION/TRAINING PROGRAM

## 2025-08-21 ENCOUNTER — TELEPHONE (OUTPATIENT)
Dept: INTERNAL MEDICINE | Facility: CLINIC | Age: 65
End: 2025-08-21
Payer: MEDICARE

## 2025-08-21 PROBLEM — M25.511 ACUTE PAIN OF RIGHT SHOULDER: Status: ACTIVE | Noted: 2025-08-21

## 2025-08-21 PROBLEM — M25.512 ACUTE PAIN OF LEFT SHOULDER: Status: RESOLVED | Noted: 2025-02-14 | Resolved: 2025-08-21

## 2025-08-22 ENCOUNTER — OFFICE VISIT (OUTPATIENT)
Dept: VASCULAR SURGERY | Facility: CLINIC | Age: 65
End: 2025-08-22
Attending: STUDENT IN AN ORGANIZED HEALTH CARE EDUCATION/TRAINING PROGRAM
Payer: MEDICARE

## 2025-08-22 ENCOUNTER — HOSPITAL ENCOUNTER (OUTPATIENT)
Dept: VASCULAR SURGERY | Facility: CLINIC | Age: 65
Discharge: HOME | End: 2025-08-22
Attending: STUDENT IN AN ORGANIZED HEALTH CARE EDUCATION/TRAINING PROGRAM
Payer: MEDICARE

## 2025-08-22 VITALS
DIASTOLIC BLOOD PRESSURE: 73 MMHG | BODY MASS INDEX: 29.69 KG/M2 | WEIGHT: 167.56 LBS | HEART RATE: 75 BPM | SYSTOLIC BLOOD PRESSURE: 136 MMHG | HEIGHT: 63 IN | TEMPERATURE: 99 F

## 2025-08-22 DIAGNOSIS — I87.2 VENOUS INSUFFICIENCY: ICD-10-CM

## 2025-08-22 DIAGNOSIS — I87.2 VENOUS INSUFFICIENCY: Primary | ICD-10-CM

## 2025-08-22 PROCEDURE — 93970 EXTREMITY STUDY: CPT | Mod: HCNC,S$GLB,, | Performed by: STUDENT IN AN ORGANIZED HEALTH CARE EDUCATION/TRAINING PROGRAM

## 2025-08-22 PROCEDURE — 99999 PR PBB SHADOW E&M-EST. PATIENT-LVL IV: CPT | Mod: PBBFAC,HCNC,, | Performed by: STUDENT IN AN ORGANIZED HEALTH CARE EDUCATION/TRAINING PROGRAM

## 2025-08-22 RX ORDER — IBUPROFEN 800 MG/1
800 TABLET, FILM COATED ORAL 2 TIMES DAILY PRN
COMMUNITY
Start: 2025-04-09

## 2025-08-22 RX ORDER — CLOTRIMAZOLE 1 %
CREAM (GRAM) TOPICAL 2 TIMES DAILY
COMMUNITY
Start: 2025-07-24

## 2025-08-26 ENCOUNTER — HOSPITAL ENCOUNTER (OUTPATIENT)
Dept: RADIOLOGY | Facility: HOSPITAL | Age: 65
Discharge: HOME | End: 2025-08-26
Attending: STUDENT IN AN ORGANIZED HEALTH CARE EDUCATION/TRAINING PROGRAM
Payer: MEDICARE

## 2025-08-26 VITALS — BODY MASS INDEX: 29.59 KG/M2 | HEIGHT: 63 IN | WEIGHT: 167 LBS

## 2025-08-26 DIAGNOSIS — Z12.31 ENCOUNTER FOR SCREENING MAMMOGRAM FOR MALIGNANT NEOPLASM OF BREAST: ICD-10-CM

## 2025-08-26 PROCEDURE — 77067 SCR MAMMO BI INCL CAD: CPT | Mod: TC,HCNC

## 2025-08-28 ENCOUNTER — CLINICAL SUPPORT (OUTPATIENT)
Dept: INTERNAL MEDICINE | Facility: CLINIC | Age: 65
End: 2025-08-28
Attending: STUDENT IN AN ORGANIZED HEALTH CARE EDUCATION/TRAINING PROGRAM
Payer: MEDICARE

## 2025-08-28 ENCOUNTER — TELEPHONE (OUTPATIENT)
Dept: INTERNAL MEDICINE | Facility: CLINIC | Age: 65
End: 2025-08-28
Payer: MEDICARE

## 2025-08-28 ENCOUNTER — TELEPHONE (OUTPATIENT)
Dept: ADMINISTRATIVE | Facility: HOSPITAL | Age: 65
End: 2025-08-28
Payer: MEDICARE

## 2025-08-28 ENCOUNTER — PATIENT OUTREACH (OUTPATIENT)
Dept: ADMINISTRATIVE | Facility: HOSPITAL | Age: 65
End: 2025-08-28
Payer: MEDICARE

## 2025-08-28 ENCOUNTER — OFFICE VISIT (OUTPATIENT)
Dept: INTERNAL MEDICINE | Facility: CLINIC | Age: 65
End: 2025-08-28
Payer: MEDICARE

## 2025-08-28 VITALS
HEIGHT: 63 IN | BODY MASS INDEX: 29.95 KG/M2 | WEIGHT: 169.06 LBS | HEART RATE: 88 BPM | OXYGEN SATURATION: 99 % | SYSTOLIC BLOOD PRESSURE: 138 MMHG | DIASTOLIC BLOOD PRESSURE: 76 MMHG

## 2025-08-28 DIAGNOSIS — Z72.0 TOBACCO ABUSE: ICD-10-CM

## 2025-08-28 DIAGNOSIS — E11.9 TYPE 2 DIABETES MELLITUS WITHOUT COMPLICATION, UNSPECIFIED WHETHER LONG TERM INSULIN USE: ICD-10-CM

## 2025-08-28 DIAGNOSIS — Z00.00 ENCOUNTER FOR MEDICARE ANNUAL WELLNESS EXAM: Primary | ICD-10-CM

## 2025-08-28 DIAGNOSIS — E11.9 TYPE 2 DIABETES MELLITUS WITHOUT COMPLICATION, UNSPECIFIED WHETHER LONG TERM INSULIN USE: Primary | ICD-10-CM

## 2025-08-28 DIAGNOSIS — E11.9 TYPE 2 DIABETES MELLITUS WITHOUT COMPLICATION, WITHOUT LONG-TERM CURRENT USE OF INSULIN: ICD-10-CM

## 2025-08-28 DIAGNOSIS — K63.5 POLYP OF COLON, UNSPECIFIED PART OF COLON, UNSPECIFIED TYPE: ICD-10-CM

## 2025-08-28 DIAGNOSIS — I10 ESSENTIAL HYPERTENSION: Chronic | ICD-10-CM

## 2025-08-28 DIAGNOSIS — D03.61 MELANOMA IN SITU OF RIGHT UPPER LIMB, INCLUDING SHOULDER: ICD-10-CM

## 2025-08-28 DIAGNOSIS — E05.00 GRAVES DISEASE: ICD-10-CM

## 2025-08-28 LAB — HM EYE EXAM: NEGATIVE

## 2025-08-28 PROCEDURE — 99999 PR PBB SHADOW E&M-EST. PATIENT-LVL III: CPT | Mod: PBBFAC,HCNC,, | Performed by: NURSE PRACTITIONER

## 2025-09-05 ENCOUNTER — TELEPHONE (OUTPATIENT)
Dept: INTERNAL MEDICINE | Facility: CLINIC | Age: 65
End: 2025-09-05
Payer: MEDICARE

## (undated) DEVICE — SOL NS 1000CC

## (undated) DEVICE — ADHESIVE DERMABOND ADVANCED

## (undated) DEVICE — SOL NACL IRR 3000ML

## (undated) DEVICE — DRAPE ABDOMINAL TIBURON 14X11

## (undated) DEVICE — DRAPE TOP 53X102IN

## (undated) DEVICE — SUT ETHILON 3-0 PS2 18 BLK

## (undated) DEVICE — KIT TRIMANO

## (undated) DEVICE — WRAP SHLDR HIP ACCU THRM PACK

## (undated) DEVICE — ELECTRODE REM PLYHSV RETURN 9

## (undated) DEVICE — NDL HYPO REG 25G X 1 1/2

## (undated) DEVICE — IRRIGATOR ENDOSCOPY DISP.

## (undated) DEVICE — SUPPORT SLING SHOT II MEDIUM

## (undated) DEVICE — KIT TRIMANO CHIN

## (undated) DEVICE — APPLIER CLIP ENDO LIGAMAX 5MM

## (undated) DEVICE — GLOVE SENSICARE PI GRN 8

## (undated) DEVICE — BLADE VORTEX SHAVER 4.5MMX13CM

## (undated) DEVICE — KIT ANTIFOG

## (undated) DEVICE — UNDERGLOVES BIOGEL PI SIZE 8.5

## (undated) DEVICE — SUT MONOCRYL 3-0 PS-2 UND

## (undated) DEVICE — UNDERGLOVES BIOGEL PI SIZE 8

## (undated) DEVICE — DRAPE STERI INSTRUMENT 1018

## (undated) DEVICE — SOL NACL IRR 1000ML BTL

## (undated) DEVICE — BAG TISS RETRV MONARCH 10MM

## (undated) DEVICE — TRAY MINOR GEN SURG

## (undated) DEVICE — CANNULA TWIST IN 7MM X 7CM

## (undated) DEVICE — PROBE ARTHO ENERGY 90 DEG

## (undated) DEVICE — KIT PROCEDURE STER INLET CLOSU

## (undated) DEVICE — BLADE SURG CARBON STEEL SZ11

## (undated) DEVICE — COVER CAMERA OPERATING ROOM

## (undated) DEVICE — SEE MEDLINE ITEM 152622

## (undated) DEVICE — PAD ELECTRODE STER 1.5X3

## (undated) DEVICE — TROCAR ENDOPATH XCEL 5X75MM

## (undated) DEVICE — BLADE SHAVER LANZA 4.2X13CM

## (undated) DEVICE — SCISSOR 5MMX35CM DIRECT DRIVE

## (undated) DEVICE — SUT PDS II 1 CT VIL MONO 36

## (undated) DEVICE — GLOVE SENSICARE PI ALOE 7.5

## (undated) DEVICE — NDL SCORPION HD MEGALOADER

## (undated) DEVICE — GOWN ECLIPSE REINF L4 XLNG XXL

## (undated) DEVICE — GLOVE SENSICARE PI MICRO 7

## (undated) DEVICE — TUBING HF INSUFFLATION W/ FLTR

## (undated) DEVICE — MAT SURGICAL ECOSUCTIONER

## (undated) DEVICE — NDL 18GA X1 1/2 REG BEVEL

## (undated) DEVICE — BNDG COFLEX FOAM LF2 ST 6X5YD

## (undated) DEVICE — DRAPE SURG W/TWL 17 5/8X23

## (undated) DEVICE — DRESSING AQUACEL AG SILVER 4X4

## (undated) DEVICE — GOWN ECLIPSE REINF LV4 XLNG XL

## (undated) DEVICE — DRAPE STERI U-SHAPED 47X51IN

## (undated) DEVICE — GLOVE SENSICARE PI GRN 7

## (undated) DEVICE — DRAPE CORETEMP FLD WRM 56X62IN

## (undated) DEVICE — PAD DERMAPROX THCK 11X15X1IN

## (undated) DEVICE — SUT SUTURETAPE TIGERLINK 1.3MM

## (undated) DEVICE — GEMINI ARTHREX SR8

## (undated) DEVICE — GLOVE PROTEXIS LIGHT BROWN 8.5

## (undated) DEVICE — Device

## (undated) DEVICE — TUBING SUC UNIV W/CONN 12FT

## (undated) DEVICE — TROCAR ENDOPATH XCEL 12MM 10CM

## (undated) DEVICE — SUT MCRYL PLUS 4-0 PS2 27IN

## (undated) DEVICE — COVER LIGHT HANDLE 80/CA